# Patient Record
Sex: MALE | Race: WHITE | NOT HISPANIC OR LATINO | Employment: FULL TIME | URBAN - METROPOLITAN AREA
[De-identification: names, ages, dates, MRNs, and addresses within clinical notes are randomized per-mention and may not be internally consistent; named-entity substitution may affect disease eponyms.]

---

## 2017-01-10 ENCOUNTER — TRANSCRIBE ORDERS (OUTPATIENT)
Dept: ADMINISTRATIVE | Facility: HOSPITAL | Age: 47
End: 2017-01-10

## 2017-01-10 DIAGNOSIS — I27.82 CHRONIC PULMONARY THROMBOEMBOLISM SYNDROME (HCC): Primary | ICD-10-CM

## 2017-01-10 DIAGNOSIS — E83.110 PIGMENT CIRRHOSIS (HCC): ICD-10-CM

## 2017-01-10 DIAGNOSIS — N04.2 NEPHROTIC SYNDROME WITH LESION OF MEMBRANOUS GLOMERULONEPHRITIS: ICD-10-CM

## 2017-01-20 ENCOUNTER — HOSPITAL ENCOUNTER (OUTPATIENT)
Dept: MRI IMAGING | Facility: HOSPITAL | Age: 47
Discharge: HOME | End: 2017-01-20
Payer: COMMERCIAL

## 2017-01-20 DIAGNOSIS — I27.82 CHRONIC PULMONARY THROMBOEMBOLISM SYNDROME (HCC): ICD-10-CM

## 2017-01-20 DIAGNOSIS — N04.2 NEPHROTIC SYNDROME WITH LESION OF MEMBRANOUS GLOMERULONEPHRITIS: ICD-10-CM

## 2017-01-20 DIAGNOSIS — E83.110 PIGMENT CIRRHOSIS (HCC): ICD-10-CM

## 2017-01-20 PROCEDURE — 74181 MRI ABDOMEN W/O CONTRAST: CPT

## 2017-01-24 ENCOUNTER — GENERIC CONVERSION - ENCOUNTER (OUTPATIENT)
Dept: OTHER | Facility: OTHER | Age: 47
End: 2017-01-24

## 2017-02-21 PROBLEM — I27.82 CHRONIC PULMONARY EMBOLISM (HCC): Status: ACTIVE | Noted: 2017-02-21

## 2017-02-21 RX ORDER — LISINOPRIL 10 MG/1
10 TABLET ORAL DAILY
COMMUNITY
End: 2018-04-25 | Stop reason: DRUGHIGH

## 2017-02-21 RX ORDER — CHOLECALCIFEROL (VITAMIN D3) 125 MCG
1 TABLET ORAL DAILY
COMMUNITY
End: 2017-02-27 | Stop reason: ALTCHOICE

## 2017-02-27 ENCOUNTER — HOSPITAL ENCOUNTER (OUTPATIENT)
Dept: CT IMAGING | Facility: HOSPITAL | Age: 47
Discharge: HOME | End: 2017-02-27
Admitting: RADIOLOGY
Payer: COMMERCIAL

## 2017-02-27 ENCOUNTER — HOSPITAL ENCOUNTER (OUTPATIENT)
Dept: RADIOLOGY | Facility: HOSPITAL | Age: 47
Discharge: HOME | End: 2017-02-27
Payer: COMMERCIAL

## 2017-02-27 VITALS
TEMPERATURE: 98.1 F | DIASTOLIC BLOOD PRESSURE: 69 MMHG | HEART RATE: 75 BPM | OXYGEN SATURATION: 98 % | RESPIRATION RATE: 18 BRPM | SYSTOLIC BLOOD PRESSURE: 109 MMHG

## 2017-02-27 VITALS
BODY MASS INDEX: 32.21 KG/M2 | WEIGHT: 225 LBS | RESPIRATION RATE: 18 BRPM | HEIGHT: 70 IN | SYSTOLIC BLOOD PRESSURE: 129 MMHG | OXYGEN SATURATION: 100 % | TEMPERATURE: 97.2 F | HEART RATE: 87 BPM | DIASTOLIC BLOOD PRESSURE: 89 MMHG

## 2017-02-27 DIAGNOSIS — R93.89 ABNORMAL MRI: ICD-10-CM

## 2017-02-27 LAB
ERYTHROCYTE [DISTWIDTH] IN BLOOD BY AUTOMATED COUNT: 13.2 % (ref 11.6–15.1)
HCT VFR BLD AUTO: 44.1 % (ref 36.5–49.3)
HGB BLD-MCNC: 15.2 G/DL (ref 12–17)
INR PPP: 1.09 (ref 0.86–1.16)
MCH RBC QN AUTO: 30.6 PG (ref 26.8–34.3)
MCHC RBC AUTO-ENTMCNC: 34.5 G/DL (ref 31.4–37.4)
MCV RBC AUTO: 89 FL (ref 82–98)
PLATELET # BLD AUTO: 217 THOUSANDS/UL (ref 149–390)
PMV BLD AUTO: 9.8 FL (ref 8.9–12.7)
PROTHROMBIN TIME: 13.9 SECONDS (ref 12–14.3)
RBC # BLD AUTO: 4.97 MILLION/UL (ref 3.88–5.62)
WBC # BLD AUTO: 5.61 THOUSAND/UL (ref 4.31–10.16)

## 2017-02-27 PROCEDURE — 85610 PROTHROMBIN TIME: CPT | Performed by: RADIOLOGY

## 2017-02-27 PROCEDURE — 99152 MOD SED SAME PHYS/QHP 5/>YRS: CPT

## 2017-02-27 PROCEDURE — 88313 SPECIAL STAINS GROUP 2: CPT | Performed by: INTERNAL MEDICINE

## 2017-02-27 PROCEDURE — 76942 ECHO GUIDE FOR BIOPSY: CPT

## 2017-02-27 PROCEDURE — 88307 TISSUE EXAM BY PATHOLOGIST: CPT | Performed by: INTERNAL MEDICINE

## 2017-02-27 PROCEDURE — 85027 COMPLETE CBC AUTOMATED: CPT | Performed by: RADIOLOGY

## 2017-02-27 PROCEDURE — 83540 ASSAY OF IRON: CPT | Performed by: INTERNAL MEDICINE

## 2017-02-27 PROCEDURE — 47000 NEEDLE BIOPSY OF LIVER PERQ: CPT

## 2017-02-27 RX ORDER — FENTANYL CITRATE 50 UG/ML
INJECTION, SOLUTION INTRAMUSCULAR; INTRAVENOUS CODE/TRAUMA/SEDATION MEDICATION
Status: COMPLETED | OUTPATIENT
Start: 2017-02-27 | End: 2017-02-27

## 2017-02-27 RX ORDER — MIDAZOLAM HYDROCHLORIDE 1 MG/ML
INJECTION INTRAMUSCULAR; INTRAVENOUS CODE/TRAUMA/SEDATION MEDICATION
Status: COMPLETED | OUTPATIENT
Start: 2017-02-27 | End: 2017-02-27

## 2017-02-27 RX ORDER — OXYCODONE HYDROCHLORIDE AND ACETAMINOPHEN 5; 325 MG/1; MG/1
TABLET ORAL
Status: COMPLETED
Start: 2017-02-27 | End: 2017-02-27

## 2017-02-27 RX ORDER — OXYCODONE HYDROCHLORIDE AND ACETAMINOPHEN 5; 325 MG/1; MG/1
1 TABLET ORAL EVERY 6 HOURS PRN
Status: CANCELLED | OUTPATIENT
Start: 2017-02-27

## 2017-02-27 RX ORDER — SODIUM CHLORIDE 9 MG/ML
75 INJECTION, SOLUTION INTRAVENOUS CONTINUOUS
Status: DISCONTINUED | OUTPATIENT
Start: 2017-02-27 | End: 2017-02-28 | Stop reason: HOSPADM

## 2017-02-27 RX ORDER — OXYCODONE HYDROCHLORIDE AND ACETAMINOPHEN 5; 325 MG/1; MG/1
1 TABLET ORAL EVERY 6 HOURS PRN
Status: DISCONTINUED | OUTPATIENT
Start: 2017-02-27 | End: 2017-02-28 | Stop reason: HOSPADM

## 2017-02-27 RX ADMIN — MIDAZOLAM HYDROCHLORIDE 1 MG: 1 INJECTION, SOLUTION INTRAMUSCULAR; INTRAVENOUS at 08:54

## 2017-02-27 RX ADMIN — FENTANYL CITRATE 50 MCG: 50 INJECTION INTRAMUSCULAR; INTRAVENOUS at 08:50

## 2017-02-27 RX ADMIN — MIDAZOLAM HYDROCHLORIDE 1 MG: 1 INJECTION, SOLUTION INTRAMUSCULAR; INTRAVENOUS at 08:50

## 2017-02-27 RX ADMIN — MIDAZOLAM HYDROCHLORIDE 1 MG: 1 INJECTION, SOLUTION INTRAMUSCULAR; INTRAVENOUS at 08:45

## 2017-02-27 RX ADMIN — FENTANYL CITRATE 50 MCG: 50 INJECTION INTRAMUSCULAR; INTRAVENOUS at 08:55

## 2017-02-27 RX ADMIN — OXYCODONE HYDROCHLORIDE AND ACETAMINOPHEN 1 TABLET: 5; 325 TABLET ORAL at 09:17

## 2017-02-27 RX ADMIN — FENTANYL CITRATE 50 MCG: 50 INJECTION INTRAMUSCULAR; INTRAVENOUS at 08:45

## 2017-03-21 ENCOUNTER — TRANSCRIBE ORDERS (OUTPATIENT)
Dept: LAB | Facility: CLINIC | Age: 47
End: 2017-03-21

## 2017-03-21 ENCOUNTER — APPOINTMENT (OUTPATIENT)
Dept: LAB | Facility: CLINIC | Age: 47
End: 2017-03-21
Payer: COMMERCIAL

## 2017-03-21 DIAGNOSIS — N04.2 NEPHROTIC SYNDROME WITH DIFFUSE MEMBRANOUS GLOMERULONEPHRITIS: ICD-10-CM

## 2017-03-21 LAB
ALBUMIN SERPL BCP-MCNC: 3.7 G/DL (ref 3.5–5)
ANION GAP SERPL CALCULATED.3IONS-SCNC: 9 MMOL/L (ref 4–13)
BUN SERPL-MCNC: 17 MG/DL (ref 5–25)
CALCIUM SERPL-MCNC: 8.8 MG/DL (ref 8.3–10.1)
CHLORIDE SERPL-SCNC: 105 MMOL/L (ref 100–108)
CO2 SERPL-SCNC: 27 MMOL/L (ref 21–32)
CREAT SERPL-MCNC: 1.54 MG/DL (ref 0.6–1.3)
CREAT UR-MCNC: 201 MG/DL
ERYTHROCYTE [DISTWIDTH] IN BLOOD BY AUTOMATED COUNT: 13.3 % (ref 11.6–15.1)
GFR SERPL CREATININE-BSD FRML MDRD: 48.9 ML/MIN/1.73SQ M
GLUCOSE SERPL-MCNC: 88 MG/DL (ref 65–140)
HCT VFR BLD AUTO: 44.1 % (ref 36.5–49.3)
HGB BLD-MCNC: 14.9 G/DL (ref 12–17)
MCH RBC QN AUTO: 30.3 PG (ref 26.8–34.3)
MCHC RBC AUTO-ENTMCNC: 33.8 G/DL (ref 31.4–37.4)
MCV RBC AUTO: 90 FL (ref 82–98)
PHOSPHATE SERPL-MCNC: 3.3 MG/DL (ref 2.7–4.5)
PLATELET # BLD AUTO: 254 THOUSANDS/UL (ref 149–390)
PMV BLD AUTO: 9.7 FL (ref 8.9–12.7)
POTASSIUM SERPL-SCNC: 4.2 MMOL/L (ref 3.5–5.3)
PROT UR-MCNC: 45 MG/DL
PROT/CREAT UR: 0.22 MG/G{CREAT} (ref 0–0.1)
PTH-INTACT SERPL-MCNC: 38.6 PG/ML (ref 14–72)
RBC # BLD AUTO: 4.91 MILLION/UL (ref 3.88–5.62)
SODIUM SERPL-SCNC: 141 MMOL/L (ref 136–145)
WBC # BLD AUTO: 5.4 THOUSAND/UL (ref 4.31–10.16)

## 2017-03-21 PROCEDURE — 84156 ASSAY OF PROTEIN URINE: CPT

## 2017-03-21 PROCEDURE — 36415 COLL VENOUS BLD VENIPUNCTURE: CPT

## 2017-03-21 PROCEDURE — 80069 RENAL FUNCTION PANEL: CPT

## 2017-03-21 PROCEDURE — 85027 COMPLETE CBC AUTOMATED: CPT

## 2017-03-21 PROCEDURE — 82570 ASSAY OF URINE CREATININE: CPT

## 2017-03-21 PROCEDURE — 83970 ASSAY OF PARATHORMONE: CPT

## 2017-04-06 ENCOUNTER — ALLSCRIPTS OFFICE VISIT (OUTPATIENT)
Dept: OTHER | Facility: OTHER | Age: 47
End: 2017-04-06

## 2017-04-13 ENCOUNTER — GENERIC CONVERSION - ENCOUNTER (OUTPATIENT)
Dept: OTHER | Facility: OTHER | Age: 47
End: 2017-04-13

## 2017-09-26 ENCOUNTER — ALLSCRIPTS OFFICE VISIT (OUTPATIENT)
Dept: OTHER | Facility: OTHER | Age: 47
End: 2017-09-26

## 2017-09-26 ENCOUNTER — APPOINTMENT (OUTPATIENT)
Dept: RADIOLOGY | Age: 47
End: 2017-09-26
Payer: COMMERCIAL

## 2017-09-26 DIAGNOSIS — J20.9 ACUTE BRONCHITIS: ICD-10-CM

## 2017-09-26 PROCEDURE — 71020 HB CHEST X-RAY 2VW FRONTAL&LATL: CPT

## 2017-09-27 ENCOUNTER — GENERIC CONVERSION - ENCOUNTER (OUTPATIENT)
Dept: OTHER | Facility: OTHER | Age: 47
End: 2017-09-27

## 2017-10-01 DIAGNOSIS — N18.30 CHRONIC KIDNEY DISEASE, STAGE III (MODERATE) (HCC): ICD-10-CM

## 2017-10-01 DIAGNOSIS — I77.811 ABDOMINAL AORTIC ECTASIA (HCC): ICD-10-CM

## 2017-10-01 DIAGNOSIS — E78.5 HYPERLIPIDEMIA: ICD-10-CM

## 2017-10-13 ENCOUNTER — ALLSCRIPTS OFFICE VISIT (OUTPATIENT)
Dept: OTHER | Facility: OTHER | Age: 47
End: 2017-10-13

## 2017-10-27 ENCOUNTER — HOSPITAL ENCOUNTER (OUTPATIENT)
Dept: NON INVASIVE DIAGNOSTICS | Facility: CLINIC | Age: 47
Discharge: HOME | End: 2017-10-27
Payer: COMMERCIAL

## 2017-10-27 DIAGNOSIS — I77.811 ABDOMINAL AORTIC ECTASIA (HCC): ICD-10-CM

## 2017-10-27 DIAGNOSIS — E78.5 HYPERLIPIDEMIA: ICD-10-CM

## 2017-10-27 PROCEDURE — 93978 VASCULAR STUDY: CPT

## 2017-10-30 ENCOUNTER — GENERIC CONVERSION - ENCOUNTER (OUTPATIENT)
Dept: OTHER | Facility: OTHER | Age: 47
End: 2017-10-30

## 2018-01-09 NOTE — CONSULTS
Chief Complaint  pt is here for a new pt consult  no complaints  History of Present Illness  HPI: Consult from Dr Mendel Rios for abnormal vascular screening    52year old male had vascular screening last year  His abdominal aorta was 2 3 cm and read as ectatic  His bp is stable on Lisinopril  He has no chest pain, no dyspnea no palpitations  Family Hx: Father had CABG ten years ago  Soc Hx:   Works at [employer]  Review of Systems      Cardiac: No complaints of chest pain, no palpitations, no fainiting  Skin: No complaints of nonhealing sores or skin rash  Genitourinary: No complaints of recurrent urinary tract infections, frequent urination at night, difficult urination, blood in urine, kidney stones, loss of bladder control, no kidney or prostate problems, no erectile dysfunction  Psychological: No complaints of feeling depressed, anxiety, panic attacks, or difficulty concentrating  General: No complaints of trouble sleeping, lack of energy, fatigue, appetite changes, weight changes, fever, frequent infections, or night sweats  Respiratory: No complaints of shortness of breath, cough with sputum, or wheezing  HEENT: No complaints of serious problems, hearing problems, nose problems, throat problems, or snoring  Gastrointestinal: No complaints of liver problems, nausea, vomiting, heartburn, constipation, bloody stools, diarrhea, problems swallowing, adbominal pain, or rectal bleeding  Hematologic: No complaints of bleeding disorders, anemia, blood clots, or excessive brusing  Neurological: No complaints of numbness, tingling, dizziness, weakness, seizures, headaches, syncope or fainting, AM fatigue, daytime sleepiness, no witnessed apnea episodes  Musculoskeletal: No complaints of arthritis, back pain, or painfull swelling  ROS reviewed  Active Problems    1  Acute bronchitis (466 0) (J20 9)   2  Acute respiratory infection (519 8) (J22)   3   Atypical chest pain (786 59) (R07 89)   4  Chronic renal failure (585 9) (N18 9)   5  CKD (chronic kidney disease), stage III (585 3) (N18 3)   6  Cough (786 2) (R05)   7  CT Liver Mass Lesion ___cm (573 8)   8  Dysfunction of Eustachian tube, unspecified laterality (381 81) (H69 80)   9  Edema (782 3) (R60 9)   10  Hepatic hemangioma (228 04) (D18 03)   11  History of allergy (V15 09) (Z88 9)   12  History of long-term treatment with high-risk medication (V58 69) (Z92 29)   13  Hyperkalemia (276 7) (E87 5)   14  Hyperlipidemia (272 4) (E78 5)   15  Hypertension (401 9) (I10)   16  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)   17  Need for prophylactic vaccination and inoculation against influenza (V04 81) (Z23)   18  Need for prophylactic vaccination and inoculation against influenza (V04 81) (Z23)   19  Nephrotic syndrome with membranous glomerulonephritis (581 1) (N04 2)   20  Neuritis (729 2) (M79 2)   21  Obesity (278 00) (E66 9)   22  Prepatellar bursitis of left knee (726 65) (M70 42)   23  Proteinuria (791 0) (R80 9)   24  Pulmonary embolism (415 19) (I26 99)   25  Special screening, prostate cancer (V76 44) (Z12 5)   26  Stress (V62 89) (F43 9)   27  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)   28  Ultrasound Abdominal Liver Nonspecific Abnormality (794 8)   29  Varicocele (456 4) (I86 1)    Past Medical History    · History of Acute bronchitis (466 0) (J20 9)   · History of Community acquired pneumonia (5) (J18 9)   · History of Elevated blood pressure reading without diagnosis of hypertension (796 2)  (R03 0)   · History of Influenza A (487 1) (J10 1)   · Need for prophylactic vaccination and inoculation against influenza (V04 81) (Z23)    The active problems and past medical history were reviewed and updated today        Surgical History    · History of Adenoidectomy   · History of Encounter for male sterilization procedure (V25 2) (Z30 2)   · History of Hemorrhoidectomy    The surgical history was reviewed and updated today  Family History    · Family history of Heart Disease (V17 49)    · Family history of Coronary Artery Disease (V17 49)   · Family history of Nephrolithiasis    · Family history of Family Health Status Of Father - Good   · Family history of Family Health Status Of Mother - Good    The family history was reviewed and updated today  Social History    · Denied: History of Drug Use   · Marital History - Currently    · Never a smoker   · Never Drank Alcohol  The social history was reviewed and updated today  Current Meds   1  Lisinopril 5 MG Oral Tablet; TAKE ONE TABLET BY MOUTH ONCE DAILY; Therapy: 51Okt8495 to (Masha Sanchez)  Requested for: 94WDE1819; Last   HT:98ZVT3164 Ordered   2  Vitamin D 2000 UNIT Oral Tablet; Take 1 tablet daily; Therapy: (Recorded:96Bdt0996) to Recorded    The medication list was reviewed and updated today  Allergies    1  No Known Drug Allergies    2  Mold   3  No Known Food Allergies    Vitals  Signs    Heart Rate: 80  Systolic: 718, LUE, Sitting  Diastolic: 88, LUE, Sitting  Height: 5 ft 8 in  Weight: 234 lb 4 oz  BMI Calculated: 35 62  BSA Calculated: 2 19  O2 Saturation: 98    Physical Exam    Constitutional   General appearance: No acute distress, well appearing and well nourished  Eyes   Conjunctiva and Sclera examination: Conjunctiva pink, sclera anicteric  Ears, Nose, Mouth, and Throat - Oropharynx: Clear, nares are clear, mucous membranes are moist    Neck   Neck and thyroid: Normal, supple, trachea midline, no thyromegaly  Pulmonary   Respiratory effort: No increased work of breathing or signs of respiratory distress  Auscultation of lungs: Clear to auscultation, no rales, no rhonchi, no wheezing, good air movement  Cardiovascular   Auscultation of heart: Normal rate and rhythm, normal S1 and S2, no murmurs  Carotid pulses: Normal, 2+ bilaterally      Peripheral vascular exam: Normal pulses throughout, no tenderness, erythema or swelling  Pedal pulses: Normal, 2+ bilaterally  Examination of extremities for edema and/or varicosities: Normal     Abdomen   Abdomen: Non-tender and no distention  Liver and spleen: No hepatomegaly or splenomegaly  Musculoskeletal Gait and station: Normal gait  Digits and nails: Normal without clubbing or cyanosis  Inspection/palpation of joints, bones, and muscles: Normal, ROM normal     Skin - Skin and subcutaneous tissue: Normal without rashes or lesions  Skin is warm and well perfused, normal turgor  Neurologic - Cranial nerves: II - XII intact  Speech: Normal     Psychiatric - Orientation to person, place, and time: Normal  Mood and affect: Normal       Results/Data  A 12 lead ECG was performed and was normal    Rhythm and rate:  ventricular rate is 79 beats per minute  normal sinus rhythm  Axis: left axis deviation  QRS: the QRS is normal   ST segment: the ST segments are normal       Assessment    1  Ectatic abdominal aorta (447 72) (I77 811)   2  Hypertension (401 9) (I10)   3  Hyperlipidemia (272 4) (E78 5)    Plan  Ectatic abdominal aorta, Hyperlipidemia    · VAS ABDOMINAL AORTA/ILIACS; COMPLETE STUDY; Status:Active; Requested  [id number];    Perform:[facility]; Due:81Cuh4285; Last Updated By:Mamta Pretty; 10/13/2017 4:42:08 PM;Ordered;  For:Ectatic abdominal aorta, Hyperlipidemia; Ordered By:Owen Sal; Hyperlipidemia    · Follow-up PRN Evaluation and Treatment  Follow-up  Status: Complete  Done:  02UWS4447   Ordered; For: Hyperlipidemia; Ordered By: Juan Manuel Diehl Performed:  Due: 40DNN6957  Hypertension    · EKG/ECG- POC; Status:Complete;   Done: 13YFN1229   Perform: In Office; Due:38Nqm6059; Last Updated By:Gabbie Puga; 10/13/2017 4:10:26 PM;Ordered;  For:Hypertension; Ordered By:Owen Sal; Discussion/Summary    Will check aortoiliac ultrasound  He is otherwise asymptomatic and has no other active issues     The patient was counseled regarding diagnostic results, instructions for management, risk factor reductions, impressions  total time of encounter was 25 minutes and 15 minutes was spent counseling        Future Appointments    Date/Time Provider Specialty Site   09/27/2018 04:00 PM Lyndon Hull DO Internal Medicine [facility]     Signatures   Electronically signed by : CLAUS Harrington ; Oct 16 2017  8:01AM EST                       (Author)

## 2018-01-10 NOTE — RESULT NOTES
Message   notify the patient the chest x-ray-no active pulmonary disease follow up as scheduled        Verified Results  * XR CHEST PA & LATERAL 70Iox8686 06:13PM Monique Haines Order Number: [id number]     Test Name Result Flag Reference   XR CHEST PA & LATERAL (Report)     CHEST      INDICATION: Productive cough     COMPARISON: 12/29/2015     VIEWS: Frontal and lateral projections     IMAGES: 2     FINDINGS:        Cardiomediastinal silhouette appears unremarkable  The lungs are clear  No pneumothorax or pleural effusion  Visualized osseous structures appear within normal limits for the patient's age  IMPRESSION:     No active pulmonary disease         Workstation performed: [id number]     Signed by:   Ariela Hutchins MD   9/27/17       Signatures   Electronically signed by : Shanell Davis DO; Sep 27 2017 10:04PM EST                       (Author)

## 2018-01-12 NOTE — MISCELLANEOUS
Message   Recorded as Task   Date: 12/14/2016 12:49 PM, Created By: Carmita Flor   Task Name: Miscellaneous   Assigned To: Priscilla Armando   Regarding Patient: Krishna Crawford, Status: Active   CommentVeleria Stacks - 14 Dec 2016 12:49 PM     TASK CREATED  Repeat labs reviewed -renal function stable, creatinine at 1 36, proteinuria stable at 0 49 off Tacrolimus  no changes on medications  Please call patient - things are stable  Lets repeat a CBC, renal function panel, PTH and UPC in 3 months  Thanks,    GC     I spoke to the patient and he is aware of above  I mailed the patient lab slips for March AG      Active Problems    1  Acute respiratory infection (519 8) (J22)   2  Atypical chest pain (786 59) (R07 89)   3  Chronic renal failure (585 9) (N18 9)   4  CKD (chronic kidney disease), stage III (585 3) (N18 3)   5  Cough (786 2) (R05)   6  CT Liver Mass Lesion ___cm (573 8)   7  Dysfunction of eustachian tube, unspecified laterality (381 81) (H69 80)   8  Edema (782 3) (R60 9)   9  Hepatic hemangioma (228 04) (D18 03)   10  History of allergy (V15 09) (Z88 9)   11  History of long-term treatment with high-risk medication (V58 69) (Z92 29)   12  Hyperkalemia (276 7) (E87 5)   13  Hyperlipidemia (272 4) (E78 5)   14  Hypertension (401 9) (I10)   15  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)   16  Need for prophylactic vaccination and inoculation against influenza (V04 81) (Z23)   17  Need for prophylactic vaccination and inoculation against influenza (V04 81) (Z23)   18  Nephrotic syndrome with membranous glomerulonephritis (581 1) (N04 2)   19  Neuritis (729 2) (M79 2)   20  Obesity (278 00) (E66 9)   21  Prepatellar bursitis of left knee (726 65) (M70 42)   22  Proteinuria (791 0) (R80 9)   23  Pulmonary embolism (415 19) (I26 99)   24  Special screening, prostate cancer (V76 44) (Z12 5)   25  Stress (V62 89) (F43 9)   26  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)   27   Ultrasound Abdominal Liver Nonspecific Abnormality (794 8)   28  Varicocele (456 4) (I86 1)    Current Meds   1  Lisinopril 5 MG Oral Tablet; TAKE 1 TABLET DAILY; Therapy: 05Xsx2647 to (Evaluate:14Rvt9427)  Requested for: 36Sbg3194; Last   Rx:89Ons4261 Ordered   2  Tacrolimus 1 MG Oral Capsule; take 1 capsule daily; Therapy: 06VKK3191 to (Evaluate:47Ryg4980)  Requested for: 18GUO5331; Last   Rx:14Jun2016 Ordered   3  Vitamin D 2000 UNIT Oral Tablet; Take 1 tablet daily Recorded    Allergies    1  No Known Drug Allergies    2  Mold   3  No Known Food Allergies    Plan  Nephrotic syndrome with membranous glomerulonephritis    · (1) CBC/ PLT (NO DIFF); Status:Active - Retrospective By Protocol Authorization; Requested for:01Mar2017;    · (1) PTH N-TERMINAL (INTACT); Status:Active - Retrospective By Protocol Authorization; Requested for:01Mar2017;    · (1) RENAL FUNCTION PANEL; Status:Active - Retrospective By Protocol Authorization; Requested for:01Mar2017;    · (1) URINE PROTEIN CREATININE RATIO; Status:Active - Retrospective By Protocol  Authorization;  Requested LRP:[id number];     Signatures   Electronically signed by : CLAUS Fung ; Dec 15 2016  2:12PM EST

## 2018-01-12 NOTE — PROGRESS NOTES
Assessment    1  Acute bronchitis (466 0) (J20 9)   2  Encounter for preventive health examination (V70 0) (Z00 00)    Assessment and plan #1 annual wellness examination overall the patient is clinically stable and doing very well; I have counseled patient follow a healthy balanced diet, I would like the patient to start exercising routinely and have encouraged the patient to lose weight I did recommend a flu vaccine in the fall when he is feeling better I will recheck a comprehensive metabolic panel and also lipid panel  RTO in one year call with any problems  Plan  Acute bronchitis    · Azithromycin 250 MG Oral Tablet (Zithromax Z-Tarik); TAKE 2 TABLETS ON DAY 1  THEN TAKE 1 TABLET A DAY FOR 4 DAYS   · ProAir  (90 Base) MCG/ACT Inhalation Aerosol Solution; INHALE 2 PUFFS  EVERY 4-6 HOURS AS NEEDED   · * XR CHEST PA & LATERAL; Status:Active; Requested LJY:55BSO5031; Health Maintenance    · (1) COMPREHENSIVE METABOLIC PANEL; Status:Active; Requested CDK:12FRF8874; Health Maintenance, Obesity    · (1) LIPID PANEL, FASTING; Status:Active; Requested TNC:23MEJ0546;     Chief Complaint  Patient is here for a yearly wellness exam       History of Present Illness  HM, Adult Male: The patient is being seen for a health maintenance evaluation  The last health maintenance visit was 1 year(s) ago  Social History: Household members include spouse  He is   Work status: working full time and occupation:   The patient has never smoked cigarettes  He reports rare alcohol use  He has never used illicit drugs  General Health: The patient's health since the last visit is described as good  He has regular dental visits  The patient brushes 1 time(s) a day, flosses occasionally and reports his last dental visit was July 2017  He denies vision problems  Vision care includes no recent eye examination  He has hearing loss  hearing is slightly decreased   He doesn't wear a hearing aid  Immunizations status: up to date  Lifestyle:  He consumes a diverse and healthy diet  He is on a low salt diet  Dietary details include 0 servings of fruit per day, 2 servings of vegetables per day, 1 servings of meat per day, 0-1 servings of whole grains per day and 1-2 servings of dairy foods per day  He has weight concerns  He exercises regularly  He exercises 3 times per week for 15-20 minutes per session  Exercise includes walking  He does not use tobacco  He consumes alcohol  He reports occasional alcohol use  He typically drinks beer  He denies drug use  Reproductive health:  the patient is sexually active  birth control is not being practiced  He denies erectile dysfunction  Screening: Active Problems    1  Acute respiratory infection (519 8) (J22)   2  Atypical chest pain (786 59) (R07 89)   3  Chronic renal failure (585 9) (N18 9)   4  CKD (chronic kidney disease), stage III (585 3) (N18 3)   5  Cough (786 2) (R05)   6  CT Liver Mass Lesion ___cm (573 8)   7  Dysfunction of Eustachian tube, unspecified laterality (381 81) (H69 80)   8  Edema (782 3) (R60 9)   9  Hepatic hemangioma (228 04) (D18 03)   10  History of allergy (V15 09) (Z88 9)   11  History of long-term treatment with high-risk medication (V58 69) (Z92 29)   12  Hyperkalemia (276 7) (E87 5)   13  Hyperlipidemia (272 4) (E78 5)   14  Hypertension (401 9) (I10)   15  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)   16  Need for prophylactic vaccination and inoculation against influenza (V04 81) (Z23)   17  Need for prophylactic vaccination and inoculation against influenza (V04 81) (Z23)   18  Nephrotic syndrome with membranous glomerulonephritis (581 1) (N04 2)   19  Neuritis (729 2) (M79 2)   20  Obesity (278 00) (E66 9)   21  Prepatellar bursitis of left knee (726 65) (M70 42)   22  Proteinuria (791 0) (R80 9)   23  Pulmonary embolism (415 19) (I26 99)   24  Special screening, prostate cancer (V76 44) (Z12 5)   25   Stress (V62 89) (F43 9)   26  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)   27  Ultrasound Abdominal Liver Nonspecific Abnormality (794 8)   28  Varicocele (456 4) (I86 1)    Past Medical History    · History of Acute bronchitis (466 0) (J20 9)   · History of Community acquired pneumonia (5) (J18 9)   · History of Elevated blood pressure reading without diagnosis of hypertension (796 2)  (R03 0)   · History of Influenza A (487 1) (J10 1)   · Need for prophylactic vaccination and inoculation against influenza (V04 81) (Z23)    Surgical History    · History of Adenoidectomy   · History of Encounter for male sterilization procedure (V25 2) (Z30 2)   · History of Hemorrhoidectomy    Family History  Mother    · Family history of Heart Disease (V17 49)  Father    · Family history of Coronary Artery Disease (V17 49)   · Family history of Nephrolithiasis  Family History    · Family history of Family Health Status Of Father - Good   · Family history of Family Health Status Of Mother - Good    Social History    · Denied: History of Drug Use   · Marital History - Currently    · Never a smoker   · Never Drank Alcohol    Current Meds   1  Lisinopril 5 MG Oral Tablet; TAKE ONE TABLET BY MOUTH ONCE DAILY; Therapy: 93Vsh6203 to ([facility])  Requested for: 32FPK1105; Last   TL:37NMQ8580 Ordered   2  Vitamin D 2000 UNIT Oral Tablet; Take 1 tablet daily; Therapy: (Recorded:61Jif6849) to Recorded    Allergies    1  No Known Drug Allergies    2  Mold   3  No Known Food Allergies    Vitals   Recorded: 23BPE7500 03:18PM   Temperature 98 3 F   Heart Rate 84   Respiration 16   Systolic 199, LUE, Sitting   Diastolic 90, LUE, Sitting   Weight 232 lb    O2 Saturation 99     Physical Exam    Constitutional   General appearance: No acute distress, well appearing and well nourished  Head and Face   Head and face: Normal     Eyes   Conjunctiva and lids: No erythema, swelling or discharge      Pupils and irises: Equal, round, reactive to light     Ears, Nose, Mouth, and Throat   External inspection of ears and nose: Normal     Otoscopic examination: Tympanic membranes translucent with normal light reflex  Canals patent without erythema  Hearing: Normal     Nasal mucosa, septum, and turbinates: Abnormal   There was clear rhinorrhea from both nares  Lips, teeth, and gums: Normal, good dentition  Oropharynx: Abnormal   The posterior pharynx was erythematous, but did not have an exudate  Neck   Neck: Supple, symmetric, trachea midline, no masses  Thyroid: Normal, no thyromegaly  Pulmonary   Respiratory effort: No increased work of breathing or signs of respiratory distress  Auscultation of lungs: Abnormal   Auscultation of the lungs revealed expiratory wheezing  Cardiovascular   Auscultation of heart: Normal rate and rhythm, normal S1 and S2, no murmurs  Pedal pulses: 2+ bilaterally  Examination of extremities for edema and/or varicosities: Normal     Abdomen   Abdomen: Non-tender, no masses  Liver and spleen: No hepatomegaly or splenomegaly  Lymphatic   Palpation of lymph nodes in neck: No lymphadenopathy  Psychiatric   Mood and affect: Normal        Future Appointments    Date/Time Provider Specialty Site   10/13/2017 04:20 PM CLAUS Bowers   Cardiology [address]   09/27/2018 04:00 PM Karena Duncan DO Internal Medicine [facility]     Signatures   Electronically signed by : Rufina Amin DO; Sep 26 2017  9:02PM EST                       (Author)

## 2018-01-13 VITALS
WEIGHT: 232 LBS | TEMPERATURE: 98.3 F | BODY MASS INDEX: 35.28 KG/M2 | HEART RATE: 84 BPM | RESPIRATION RATE: 16 BRPM | SYSTOLIC BLOOD PRESSURE: 144 MMHG | OXYGEN SATURATION: 99 % | DIASTOLIC BLOOD PRESSURE: 90 MMHG

## 2018-01-13 VITALS
BODY MASS INDEX: 35.61 KG/M2 | SYSTOLIC BLOOD PRESSURE: 135 MMHG | HEIGHT: 68 IN | WEIGHT: 235 LBS | DIASTOLIC BLOOD PRESSURE: 85 MMHG | HEART RATE: 68 BPM

## 2018-01-13 NOTE — MISCELLANEOUS
Message   Recorded as Task   Date: 10/06/2016 07:39 PM, Created By: Mahi Schultz   Task Name: Miscellaneous   Assigned To: Emiliano Knutson   Regarding Patient: Sun Sesay, Status: Active   CommentAnnella Orris - 06 Oct 2016 7:39 PM     TASK CREATED  labs reviewed, serum albumin and proteinuria stable and minimal   Please call patient - plan to STOP Tacrolimus and repeat a renal function panel and UPC in 2 months  Thanks,    GC       I spoke to the patient and he is aware to stop the Tacrolimus  I mailed the lab slips for 2 months    AG      Active Problems    1  Acute respiratory infection (519 8) (J22)   2  Atypical chest pain (786 59) (R07 89)   3  Chronic renal failure (585 9) (N18 9)   4  CKD (chronic kidney disease), stage III (585 3) (N18 3)   5  Cough (786 2) (R05)   6  CT Liver Mass Lesion ___cm (573 8)   7  Dysfunction of eustachian tube, unspecified laterality (381 81) (H69 80)   8  Edema (782 3) (R60 9)   9  Hepatic hemangioma (228 04) (D18 03)   10  History of allergy (V15 09) (Z88 9)   11  History of long-term treatment with high-risk medication (V58 69) (Z92 29)   12  Hyperkalemia (276 7) (E87 5)   13  Hyperlipidemia (272 4) (E78 5)   14  Hypertension (401 9) (I10)   15  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)   16  Need for prophylactic vaccination and inoculation against influenza (V04 81) (Z23)   17  Need for prophylactic vaccination and inoculation against influenza (V04 81) (Z23)   18  Nephrotic syndrome with membranous glomerulonephritis (581 1) (N04 2)   19  Neuritis (729 2) (M79 2)   20  Obesity (278 00) (E66 9)   21  Prepatellar bursitis of left knee (726 65) (M70 42)   22  Proteinuria (791 0) (R80 9)   23  Pulmonary embolism (415 19) (I26 99)   24  Special screening, prostate cancer (V76 44) (Z12 5)   25  Stress (V62 89) (F43 9)   26  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)   27  Ultrasound Abdominal Liver Nonspecific Abnormality (794 8)   28   Varicocele (456 4) (I86 1)    Current Meds   1  Lisinopril 5 MG Oral Tablet; TAKE 1 TABLET DAILY; Therapy: 14Yof3221 to (Evaluate:12Njk6124)  Requested for: 93Wkd3298; Last   Rx:62Zka9426 Ordered   2  Tacrolimus 1 MG Oral Capsule; take 1 capsule daily; Therapy: 74MRY6371 to (Evaluate:89Nvt6627)  Requested for: 77AYP7056; Last   Rx:49Owg0911 Ordered   3  Vitamin D 2000 UNIT Oral Tablet; Take 1 tablet daily Recorded    Allergies    1  No Known Drug Allergies    2  Mold   3  No Known Food Allergies    Plan  Membranous glomerulonephritis    · (1) RENAL FUNCTION PANEL; Status:Active - Retrospective By Protocol Authorization; Requested for:98Kmo0905;    · (1) URINE PROTEIN CREATININE RATIO; Status:Active - Retrospective By Protocol  Authorization;  Requested for:13Vum5288;     Signatures   Electronically signed by : CLAUS Alvarez ; Oct 10 2016  4:30PM EST

## 2018-01-13 NOTE — MISCELLANEOUS
Message   Recorded as Task   Date: 06/07/2016 02:12 PM, Created By: Jessika Neumann   Task Name: Miscellaneous   Assigned To: Franciscodaniel Search   Regarding Patient: Kim Ahumada, Status: Active   CommentDorenda Friday - 07 Jun 2016 2:12 PM     TASK CREATED  Repeat UPC slightly better proteinuria but higher compared with numbers last year  Now UPC 0 87 gr  Please call Jeri Simone and tell him to continue with same dose of Tacrolimus (he should be on 1 mgr daily) and will follow repeat labs in 2 months (CBC, renal function panel, and UPC)  Thanks,  GC       I spoke with the patient and he is aware of above  I mailed out a lab slip for him to do in August AG      Active Problems    1  Acute respiratory infection (519 8) (J22)   2  Atypical chest pain (786 59) (R07 89)   3  Chronic renal failure (585 9) (N18 9)   4  Cough (786 2) (R05)   5  CT Liver Mass Lesion ___cm (573 8)   6  Dysfunction of eustachian tube, unspecified laterality (381 81) (H69 80)   7  Edema (782 3) (R60 9)   8  Hepatic hemangioma (228 04) (D18 03)   9  History of allergy (V15 09) (Z88 9)   10  History of long-term treatment with high-risk medication (V58 69) (Z92 29)   11  Hyperkalemia (276 7) (E87 5)   12  Hyperlipidemia (272 4) (E78 5)   13  Hypertension (401 9) (I10)   14  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)   15  Need for prophylactic vaccination and inoculation against influenza (V04 81) (Z23)   16  Need for prophylactic vaccination and inoculation against influenza (V04 81) (Z23)   17  Nephrotic syndrome with membranous glomerulonephritis (581 1) (N04 2)   18  Neuritis (729 2) (M79 2)   19  Obesity (278 00) (E66 9)   20  Proteinuria (791 0) (R80 9)   21  Pulmonary embolism (415 19) (I26 99)   22  Screening for prostate cancer (V76 44) (Z12 5)   23  Stress (V62 89) (F43 9)   24  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)   25  Ultrasound Abdominal Liver Nonspecific Abnormality (794 8)   26   Varicocele (456 4) (I86 1)    Current Meds   1  AmLODIPine Besylate 5 MG Oral Tablet; TAKE 1 TABLET DAILY; Therapy: 31WJC1366 to (Evaluate:44Oih8784)  Requested for: 45WPE1396; Last   Rx:27Zgb1413 Ordered   2  Simvastatin 20 MG Oral Tablet; TAKE ONE TABLET BY MOUTH ONCE DAILY; Therapy: 56YRP1088 to (Evaluate:10Bmd8677)  Requested for: 45Cjg8009; Last   Rx:25Uwj1346 Ordered   3  Tacrolimus 1 MG Oral Capsule; take 1 capsule daily  Requested for: 09YRJ8654; Last   Rx:95Hnh4224 Ordered   4  Vitamin D 2000 UNIT Oral Tablet; Take 1 tablet daily Recorded    Allergies    1  No Known Drug Allergies    2  Mold   3  No Known Food Allergies    Plan  Nephrotic syndrome with membranous glomerulonephritis    · (1) CBC/ PLT (NO DIFF); Status:Active; Requested for:16Jux4288;    · (1) RENAL FUNCTION PANEL; Status:Active; Requested for:09Whe0127;    · (1) URINE PROTEIN CREATININE RATIO; Status:Active;  Requested for:55Yqy9022;     Signatures   Electronically signed by : CLAUS Lala ; Jun 7 2016  3:11PM EST

## 2018-01-13 NOTE — RESULT NOTES
Message   Notify the patient x-ray of the left knee no acute osseous abnormality f/u as scheduled        Verified Results  * XR KNEE 3 VW LEFT NON INJURY 83Ftp0992 04:02PM Emil Canseco Order Number: [id number]     Test Name Result Flag Reference   XR KNEE 3 VW LEFT (Report)     LEFT KNEE     INDICATION: Left knee pain  M [age]     COMPARISON: None     VIEWS: 3; 3 images with weightbearing     FINDINGS:     There is no acute fracture or dislocation  There is no joint effusion  No degenerative changes  No lytic or blastic lesions are seen  Soft tissues are unremarkable  IMPRESSION:     No acute osseous abnormality         Workstation performed: [id number]     Signed by:   Jim Smith MD   9/30/16       Signatures   Electronically signed by : Andreia Cao DO; Oct  1 2016  5:17PM EST                       (Author)

## 2018-01-14 VITALS
SYSTOLIC BLOOD PRESSURE: 128 MMHG | WEIGHT: 234.25 LBS | DIASTOLIC BLOOD PRESSURE: 88 MMHG | HEART RATE: 80 BPM | BODY MASS INDEX: 35.5 KG/M2 | HEIGHT: 68 IN | OXYGEN SATURATION: 98 %

## 2018-01-15 NOTE — RESULT NOTES
Verified Results  VAS ABDOMINAL AORTA/ILIACS; COMPLETE STUDY [id number] 03:48PM Lara Jin Order Number: [id number]    - Patient Instructions: To schedule this appointment, please contact Central Scheduling at [phone]  Test Name Result Flag Reference   VAS ABDOMINAL AORTA/ILIACS; COMPLETE STUDY (Report)     [facility] REPORT   CLINICAL:   Indications:   Evaluate ectatic abdominal aorta as indicated from a screening done 8/10/2016  Patient is asymptomatic  Risk Factors: The patient has history of Hypertension  Clinical   Left Pressure: 126/84 mm Hg  FINDINGS:      Unilateral    PSV (cm/s) AP (cm) TRV (cm)    Sup-Megan Ao        85              Sup Renal Aorta              2 0    Jux Renal Aorta     73   1 9         Px Inf-Carmine Ao          2 2    2 2    Ds Inf-Carmine Ao      69   1 8    1 8    Celiac         182              Prox  SMA        225                 Segment   Right             Left             PSV (cm/s) AP (cm) TRV (cm) PSV (cm/s) AP (cm) TRV (cm)    Prox ANTON      113   1 2    1 1     67   1 2    1 2    Dist ANTON      90               75              Prox  EIA      99   1 0          90              Dist EIA      104               95   1 0         Prox Renal     150               98                       CONCLUSION:   Impression   The aorta is patent and normal in caliber measuring 2 2 cm in its greatest   diameter  (Prior 2 3 cm)      Bilateral common and external iliac arteries are patent and normal caliber  Celiac, superior mesenteric, and bilateral proximal renal arteries are patent        SIGNATURE:   Electronically Signed by: Elisa Hedrick MD on 2017-10-27 09:23:03 PM

## 2018-01-15 NOTE — MISCELLANEOUS
Message   Recorded as Task   Date: 09/07/2016 01:09 PM, Created By: Juliocesar Verde   Task Name: Miscellaneous   Assigned To: Sina Chowdhury   Regarding Patient: Keegan Sevilla, Status: Active   CommentBreezy Hernandez - 07 Sep 2016 1:09 PM     TASK CREATED  lab after starting ACE-I reviewed - creatinine and potassium stable, no change on medications for now  Please call patient and convey message and ask for his BP readings  Bri Fitzpatrick - 07 Sep 2016 2:57 PM     TASK REPLIED TO: Previously Assigned To Patricia Molina  Patient is aware of above    Bp readings:         8/30 124/70 p72  9/2   118/77 p74  9/5   131/76 p69  9/6   126/71 p71   Hayden Lee - 07 Sep 2016 3:07 PM     TASK REPLIED TO: Previously Assigned To Hayden Lee  BP OK, nicolas        Active Problems    1  Acute respiratory infection (519 8) (J22)   2  Atypical chest pain (786 59) (R07 89)   3  Chronic renal failure (585 9) (N18 9)   4  CKD (chronic kidney disease), stage III (585 3) (N18 3)   5  Cough (786 2) (R05)   6  CT Liver Mass Lesion ___cm (573 8)   7  Dysfunction of eustachian tube, unspecified laterality (381 81) (H69 80)   8  Edema (782 3) (R60 9)   9  Hepatic hemangioma (228 04) (D18 03)   10  History of allergy (V15 09) (Z88 9)   11  History of long-term treatment with high-risk medication (V58 69) (Z92 29)   12  Hyperkalemia (276 7) (E87 5)   13  Hyperlipidemia (272 4) (E78 5)   14  Hypertension (401 9) (I10)   15  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)   16  Need for prophylactic vaccination and inoculation against influenza (V04 81) (Z23)   17  Need for prophylactic vaccination and inoculation against influenza (V04 81) (Z23)   18  Nephrotic syndrome with membranous glomerulonephritis (581 1) (N04 2)   19  Neuritis (729 2) (M79 2)   20  Obesity (278 00) (E66 9)   21  Proteinuria (791 0) (R80 9)   22   Pulmonary embolism (415 19) (I26 99)   23  Special screening, prostate cancer (V76 44) (Z12 5) 24  Stress (V62 89) (F43 9)   25  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)   26  Ultrasound Abdominal Liver Nonspecific Abnormality (794 8)   27  Varicocele (456 4) (I86 1)    Current Meds   1  Lisinopril 5 MG Oral Tablet; TAKE 1 TABLET DAILY; Therapy: 78Scz2037 to (Evaluate:40Qij8085)  Requested for: 28Hck2736; Last   Rx:89Jgb8561 Ordered   2  Tacrolimus 1 MG Oral Capsule; take 1 capsule daily; Therapy: 47YAQ0758 to (Evaluate:02Csm2320)  Requested for: 83UXO9985; Last   Rx:10Nsj9976 Ordered   3  Vitamin D 2000 UNIT Oral Tablet; Take 1 tablet daily Recorded    Allergies    1  No Known Drug Allergies    2  Mold   3   No Known Food Allergies    Signatures   Electronically signed by : CLAUS Villavicencio ; Sep  7 2016  3:17PM EST

## 2018-02-15 ENCOUNTER — TELEPHONE (OUTPATIENT)
Dept: NEPHROLOGY | Facility: CLINIC | Age: 48
End: 2018-02-15

## 2018-02-15 DIAGNOSIS — I10 ESSENTIAL HYPERTENSION: ICD-10-CM

## 2018-02-15 DIAGNOSIS — N18.30 CKD (CHRONIC KIDNEY DISEASE) STAGE 3, GFR 30-59 ML/MIN (HCC): Primary | ICD-10-CM

## 2018-02-15 DIAGNOSIS — N05.2 MEMBRANOUS GLOMERULONEPHRITIS: ICD-10-CM

## 2018-02-15 NOTE — TELEPHONE ENCOUNTER
Patient called because he has an appt on 4/11 and no lab work to be done  I put the orders into the system and he will have them done at [facility]

## 2018-03-30 ENCOUNTER — APPOINTMENT (OUTPATIENT)
Dept: LAB | Facility: CLINIC | Age: 48
End: 2018-03-30
Payer: COMMERCIAL

## 2018-03-30 ENCOUNTER — TRANSCRIBE ORDERS (OUTPATIENT)
Dept: LAB | Facility: CLINIC | Age: 48
End: 2018-03-30

## 2018-03-30 DIAGNOSIS — N18.30 CKD (CHRONIC KIDNEY DISEASE) STAGE 3, GFR 30-59 ML/MIN (HCC): ICD-10-CM

## 2018-03-30 DIAGNOSIS — I10 ESSENTIAL HYPERTENSION: ICD-10-CM

## 2018-03-30 DIAGNOSIS — N05.2 MEMBRANOUS GLOMERULONEPHRITIS: ICD-10-CM

## 2018-03-30 LAB
ALBUMIN SERPL BCP-MCNC: 3.5 G/DL (ref 3.5–5)
ANION GAP SERPL CALCULATED.3IONS-SCNC: 8 MMOL/L (ref 4–13)
BASOPHILS # BLD AUTO: 0.05 THOUSANDS/ΜL (ref 0–0.1)
BASOPHILS NFR BLD AUTO: 1 % (ref 0–1)
BUN SERPL-MCNC: 19 MG/DL (ref 5–25)
CALCIUM SERPL-MCNC: 8.8 MG/DL (ref 8.3–10.1)
CHLORIDE SERPL-SCNC: 108 MMOL/L (ref 100–108)
CO2 SERPL-SCNC: 26 MMOL/L (ref 21–32)
CREAT SERPL-MCNC: 1.33 MG/DL (ref 0.6–1.3)
CREAT UR-MCNC: 196 MG/DL
EOSINOPHIL # BLD AUTO: 0.17 THOUSAND/ΜL (ref 0–0.61)
EOSINOPHIL NFR BLD AUTO: 3 % (ref 0–6)
ERYTHROCYTE [DISTWIDTH] IN BLOOD BY AUTOMATED COUNT: 13.1 % (ref 11.6–15.1)
GFR SERPL CREATININE-BSD FRML MDRD: 63 ML/MIN/1.73SQ M
GLUCOSE P FAST SERPL-MCNC: 88 MG/DL (ref 65–99)
HCT VFR BLD AUTO: 42.3 % (ref 36.5–49.3)
HGB BLD-MCNC: 14.8 G/DL (ref 12–17)
LYMPHOCYTES # BLD AUTO: 2.16 THOUSANDS/ΜL (ref 0.6–4.47)
LYMPHOCYTES NFR BLD AUTO: 36 % (ref 14–44)
MCH RBC QN AUTO: 31.1 PG (ref 26.8–34.3)
MCHC RBC AUTO-ENTMCNC: 35 G/DL (ref 31.4–37.4)
MCV RBC AUTO: 89 FL (ref 82–98)
MONOCYTES # BLD AUTO: 0.54 THOUSAND/ΜL (ref 0.17–1.22)
MONOCYTES NFR BLD AUTO: 9 % (ref 4–12)
NEUTROPHILS # BLD AUTO: 3.12 THOUSANDS/ΜL (ref 1.85–7.62)
NEUTS SEG NFR BLD AUTO: 51 % (ref 43–75)
PHOSPHATE SERPL-MCNC: 3.3 MG/DL (ref 2.7–4.5)
PLATELET # BLD AUTO: 216 THOUSANDS/UL (ref 149–390)
PMV BLD AUTO: 9.7 FL (ref 8.9–12.7)
POTASSIUM SERPL-SCNC: 4.1 MMOL/L (ref 3.5–5.3)
PROT UR-MCNC: 78 MG/DL
PROT/CREAT UR: 0.4 MG/G{CREAT} (ref 0–0.1)
PTH-INTACT SERPL-MCNC: 43.7 PG/ML (ref 18.4–80.1)
RBC # BLD AUTO: 4.76 MILLION/UL (ref 3.88–5.62)
SODIUM SERPL-SCNC: 142 MMOL/L (ref 136–145)
WBC # BLD AUTO: 6.04 THOUSAND/UL (ref 4.31–10.16)

## 2018-03-30 PROCEDURE — 80069 RENAL FUNCTION PANEL: CPT

## 2018-03-30 PROCEDURE — 82570 ASSAY OF URINE CREATININE: CPT

## 2018-03-30 PROCEDURE — 83970 ASSAY OF PARATHORMONE: CPT

## 2018-03-30 PROCEDURE — 85025 COMPLETE CBC W/AUTO DIFF WBC: CPT

## 2018-03-30 PROCEDURE — 36415 COLL VENOUS BLD VENIPUNCTURE: CPT

## 2018-03-30 PROCEDURE — 84156 ASSAY OF PROTEIN URINE: CPT

## 2018-04-11 ENCOUNTER — OFFICE VISIT (OUTPATIENT)
Dept: NEPHROLOGY | Facility: CLINIC | Age: 48
End: 2018-04-11
Payer: COMMERCIAL

## 2018-04-11 VITALS
HEIGHT: 66 IN | HEART RATE: 80 BPM | SYSTOLIC BLOOD PRESSURE: 143 MMHG | WEIGHT: 246.6 LBS | DIASTOLIC BLOOD PRESSURE: 83 MMHG | BODY MASS INDEX: 39.63 KG/M2

## 2018-04-11 DIAGNOSIS — I10 ESSENTIAL HYPERTENSION: ICD-10-CM

## 2018-04-11 DIAGNOSIS — N18.30 CHRONIC KIDNEY DISEASE (CKD), STAGE III (MODERATE) (HCC): Primary | ICD-10-CM

## 2018-04-11 DIAGNOSIS — N02.2 MEMBRANOUS NEPHROPATHY DETERMINED BY BIOPSY: ICD-10-CM

## 2018-04-11 PROCEDURE — 99214 OFFICE O/P EST MOD 30 MIN: CPT | Performed by: INTERNAL MEDICINE

## 2018-04-11 NOTE — PROGRESS NOTES
OFFICE FOLLOW UP - Nephrology   Joshua Farrell 52 y o  male MRN: [id number]       ASSESSMENT and PLAN:  Cy Arnett was seen today for follow-up  Diagnoses and all orders for this visit:    Chronic kidney disease (CKD), stage III (moderate)  -     Protein / creatinine ratio, urine; Future  -     Renal function panel; Future    Essential hypertension    Membranous nephropathy determined by biopsy  -     Protein / creatinine ratio, urine; Future  -     Renal function panel; Future        66-year-old gentleman with history of chronic kidney disease stage 3 secondary to biopsy-proven idiopathic membranous glomerulonephritis, previously treated with prednisone and tacrolimus, he has been off steroids since June 2015, and tacrolimus was stopped in October 2016, he returns today for a year follow-up  1   Chronic kidney disease stage IIIA, most recent creatinine is stable at 1 3, proteinuria minimal at 0 4 based on the last UPC  Continue with low-dose Ace inhibitors  I would like to repeat a UPC and a renal function panel in 6 months, if since remains stable I would like to see him back in the office in 1 year  Advised about losing weight  2   Hypertension, blood pressure fairly controlled on lisinopril low dose  3   Biopsy proving idiopathic membranous glomerulonephritis, previously treated with combination of steroids and tacrolimus, off any treatment since October 2016  Most recent proteinuria 0 4 on low-dose Ace inhibitors  4   Hemoglobin normal     5   Obesity, advised to lose weight  There are no Patient Instructions on file for this visit  HPI: Joshua Farrell is a 52 y o  male who is here for Follow-up    Last time seen in our office was in April 2017, today he returns for the year follow-up  Since our last visit, there has been no ER visits or hospitilizations  Patient currently has no complaints at this time and is feeling well  Patient denies any chest pain, shortness of breath and swelling  The last blood work was done on 03/30/2018, which we have reviewed together  Denies any urinary problems, no significant foamy urine, no chest pain or shortness of breath, no lower extremity edema  His weight increase 11 lb since last office visit  ROS:   All the systems were reviewed and were negative except as documented on the HPI  Allergies: Molds & smuts    Medications:   Current Outpatient Prescriptions:     lisinopril (ZESTRIL) 10 mg tablet, Take 10 mg by mouth daily, Disp: , Rfl:     Cholecalciferol (VITAMIN D3) 22599 UNITS TABS, Take by mouth, Disp: , Rfl:     Past Medical History:   Diagnosis Date    [facility]     Dyslipidemia     Hemochromatosis     Hemorrhoids     Hypertension     Membranous glomerulonephritis     Proteinuria     [facility]monary embolus ([facility] [address] [facility]     Renal vein thrombosis ([facility] )      History reviewed  No pertinent surgical history  Family History   Problem Relation Age of Onset    No Known Problems Mother     Heart disease Father     Hypertension Father     No Known Problems Sister     No Known Problems Brother       reports that he has never smoked  He has never used smokeless tobacco  He reports that he drinks alcohol  He reports that he does not use drugs  Physical Exam:   Vitals:    04/11/18 1526   BP: 143/83   BP Location: Left arm   Patient Position: Sitting   Pulse: 80   Weight: 112 kg (246 lb 9 6 oz)   Height: 5' 6" (1 676 m)     Body mass index is 39 8 kg/m²      General: cooperative, in not acute distress  Eyes: conjunctivae pink, anicteric sclerae  ENT: lips and mucous membranes moist  Neck: supple, no JVD  Chest: clear breath sounds bilateral, no crackles, ronchus or wheezings  CVS: distinct S1 & S2, normal rate, regular rhythm  Abdomen: soft, non-tender, non-distended, normoactive bowel sounds  Extremities: no edema of both legs  Skin: no rash  Neuro: awake, alert, oriented      Lab Results:  Results for orders placed or performed in visit on 03/30/18   PTH, intact   Result Value Ref Range    PTH 43 7 18 4 - 80 1 pg/mL   Renal function panel   Result Value Ref Range    Albumin 3 5 3 5 - 5 0 g/dL    Calcium 8 8 8 3 - 10 1 mg/dL    Phosphorus 3 3 2 7 - 4 5 mg/dL    BUN 19 5 - 25 mg/dL    Creatinine 1 33 (H) 0 60 - 1 30 mg/dL    Sodium 142 136 - 145 mmol/L    Potassium 4 1 3 5 - 5 3 mmol/L    Chloride 108 100 - 108 mmol/L    CO2 26 21 - 32 mmol/L    Anion Gap 8 4 - 13 mmol/L    eGFR 63 ml/min/1 73sq m    Glucose, Fasting 88 65 - 99 mg/dL   Protein / creatinine ratio, urine   Result Value Ref Range    Creatinine, Ur 196 0 mg/dL    Protein Urine Random 78 mg/dL    Prot/Creat Ratio, Ur 0 40 (H) 0 00 - 0 10   CBC and differential   Result Value Ref Range    WBC 6 04 4 31 - 10 16 Thousand/uL    RBC 4 76 3 88 - 5 62 Million/uL    Hemoglobin 14 8 12 0 - 17 0 g/dL    Hematocrit 42 3 36 5 - 49 3 %    MCV 89 82 - 98 fL    MCH 31 1 26 8 - 34 3 pg    MCHC 35 0 31 4 - 37 4 g/dL    RDW 13 1 11 6 - 15 1 %    MPV 9 7 8 9 - 12 7 fL    Platelets 816 521 - 064 Thousands/uL    Neutrophils Relative 51 43 - 75 %    Lymphocytes Relative 36 14 - 44 %    Monocytes Relative 9 4 - 12 %    Eosinophils Relative 3 0 - 6 %    Basophils Relative 1 0 - 1 %    Neutrophils Absolute 3 12 1 85 - 7 62 Thousands/µL    Lymphocytes Absolute 2 16 0 60 - 4 47 Thousands/µL    Monocytes Absolute 0 54 0 17 - 1 22 Thousand/µL    Eosinophils Absolute 0 17 0 00 - 0 61 Thousand/µL    Basophils Absolute 0 05 0 00 - 0 10 Thousands/µL               Portions of the record may have been created with voice recognition software  Occasional wrong word or "sound a like" substitutions may have occurred due to the inherent limitations of voice recognition software  Read the chart carefully and recognize, using context, where substitutions have occurred  If you have any questions, please contact the dictating provider

## 2018-04-11 NOTE — PATIENT INSTRUCTIONS
I would like to repeat a urine and blood tests in 6 months, if renal function remains stable I would like to see you back in the office in 6 months  Advised to lose weight  Avoid NSAIDs  Recommend to follow a healthy lifestyle including following a low-salt diet, avoid smoking, do regular physical activity, avoid anti-inflammatory/NSAIDs medication (no ibuprofen, Motrin, Advil, naproxen), keep a good weight and to continue with regular follow-up as instructed  Hypertension and diabetes are the two most common causes of chronic kidney disease (CKD), if you have any of them, it is important to have a good blood sugar (hemoglobin A1c less than 7 percent) as well as good blood pressure control to slow down the progression of your CKD

## 2018-04-25 ENCOUNTER — OFFICE VISIT (OUTPATIENT)
Dept: INTERNAL MEDICINE CLINIC | Facility: CLINIC | Age: 48
End: 2018-04-25
Payer: COMMERCIAL

## 2018-04-25 VITALS
SYSTOLIC BLOOD PRESSURE: 124 MMHG | TEMPERATURE: 98.3 F | WEIGHT: 242.6 LBS | OXYGEN SATURATION: 98 % | DIASTOLIC BLOOD PRESSURE: 90 MMHG | HEART RATE: 91 BPM | BODY MASS INDEX: 39.16 KG/M2

## 2018-04-25 DIAGNOSIS — J01.00 ACUTE NON-RECURRENT MAXILLARY SINUSITIS: Primary | ICD-10-CM

## 2018-04-25 PROCEDURE — 99213 OFFICE O/P EST LOW 20 MIN: CPT | Performed by: NURSE PRACTITIONER

## 2018-04-25 RX ORDER — AMOXICILLIN AND CLAVULANATE POTASSIUM 875; 125 MG/1; MG/1
1 TABLET, FILM COATED ORAL EVERY 12 HOURS SCHEDULED
Qty: 14 TABLET | Refills: 0 | Status: SHIPPED | OUTPATIENT
Start: 2018-04-25 | End: 2018-05-02

## 2018-04-25 RX ORDER — LISINOPRIL 5 MG/1
TABLET ORAL
COMMUNITY
Start: 2018-04-18 | End: 2018-07-17 | Stop reason: SDUPTHER

## 2018-04-25 NOTE — PROGRESS NOTES
Assessment/Plan:       Diagnoses and all orders for this visit:    Acute non-recurrent maxillary sinusitis  -     amoxicillin-clavulanate (AUGMENTIN) 875-125 mg per tablet; Take 1 tablet by mouth every 12 (twelve) hours for 7 days    Other orders  -     lisinopril (ZESTRIL) 5 mg tablet;           Subjective:      Patient ID: Wild Comer is a 50 y o  male  Since Monday has been having nasal congestion, pressure, sore throat    Green mucous        The following portions of the patient's history were reviewed and updated as appropriate: allergies, current medications, past family history, past medical history, past social history, past surgical history and problem list     Review of Systems   Constitutional: Negative  HENT: Positive for postnasal drip, sinus pain, sinus pressure and sore throat  Eyes: Negative  Respiratory: Positive for cough  Cardiovascular: Negative  Gastrointestinal: Negative  Musculoskeletal: Negative  Neurological: Negative  Objective:      /90   Pulse 91   Temp 98 3 °F (36 8 °C) (Oral)   Wt 110 kg (242 lb 9 6 oz)   SpO2 98%   BMI 39 16 kg/m²          Physical Exam   Constitutional: He is oriented to person, place, and time  He appears well-developed and well-nourished  HENT:   Head: Normocephalic and atraumatic  Eyes: Conjunctivae are normal  Pupils are equal, round, and reactive to light  Neck: Normal range of motion  Neck supple  Cardiovascular: Normal rate and regular rhythm  Pulmonary/Chest: Effort normal and breath sounds normal    Abdominal: Soft  Bowel sounds are normal    Musculoskeletal: Normal range of motion  Neurological: He is alert and oriented to person, place, and time  Skin: Skin is warm and dry

## 2018-06-01 ENCOUNTER — TELEPHONE (OUTPATIENT)
Dept: UROLOGY | Facility: AMBULATORY SURGERY CENTER | Age: 48
End: 2018-06-01

## 2018-06-01 NOTE — TELEPHONE ENCOUNTER
Spoke with patient and informed that we do not need a repeat PSA done at this time  Patient does not feel like an appointment at this time is necessary  Patient knows to call if he has any urinary symptoms or questions

## 2018-06-01 NOTE — TELEPHONE ENCOUNTER
Patient managed by Dr Itz Anne last seen August 2016 for prostate cancer screening and varicocele  Patient called office as requested by PCP since his most recent PSA on 5/22/18 is up to 0 9  His PSA on 5/19/17 was 0 3  Spoke with patient and he does not report being ill at the time of the most recent PSA being drawn  Would you like to repeat PSA in 4-6 wks and proceed from there?

## 2018-07-17 DIAGNOSIS — N05.2 MEMBRANOUS GLOMERULONEPHRITIS: Primary | ICD-10-CM

## 2018-07-17 RX ORDER — LISINOPRIL 5 MG/1
TABLET ORAL
Qty: 90 TABLET | Refills: 3 | Status: SHIPPED | OUTPATIENT
Start: 2018-07-17 | End: 2019-07-16 | Stop reason: SDUPTHER

## 2018-09-26 DIAGNOSIS — E66.9 OBESITY: ICD-10-CM

## 2018-09-26 DIAGNOSIS — Z00.00 ENCOUNTER FOR GENERAL ADULT MEDICAL EXAMINATION WITHOUT ABNORMAL FINDINGS: ICD-10-CM

## 2018-09-27 ENCOUNTER — OFFICE VISIT (OUTPATIENT)
Dept: INTERNAL MEDICINE CLINIC | Facility: CLINIC | Age: 48
End: 2018-09-27
Payer: COMMERCIAL

## 2018-09-27 VITALS
HEART RATE: 74 BPM | OXYGEN SATURATION: 98 % | SYSTOLIC BLOOD PRESSURE: 124 MMHG | BODY MASS INDEX: 35.1 KG/M2 | WEIGHT: 237 LBS | DIASTOLIC BLOOD PRESSURE: 88 MMHG | HEIGHT: 69 IN

## 2018-09-27 DIAGNOSIS — Z12.5 SCREENING PSA (PROSTATE SPECIFIC ANTIGEN): Primary | ICD-10-CM

## 2018-09-27 DIAGNOSIS — Z00.00 WELLNESS EXAMINATION: ICD-10-CM

## 2018-09-27 DIAGNOSIS — Z13.6 SCREENING FOR CARDIOVASCULAR CONDITION: ICD-10-CM

## 2018-09-27 DIAGNOSIS — Z01.00 VISION SCREEN WITHOUT ABNORMAL FINDINGS: ICD-10-CM

## 2018-09-27 PROCEDURE — 99396 PREV VISIT EST AGE 40-64: CPT | Performed by: INTERNAL MEDICINE

## 2018-09-27 NOTE — PROGRESS NOTES
Assessment/Plan:           Problem List Items Addressed This Visit        Other    Wellness examination     Assessment and plan 1  Health maintenance annual wellness examination overall the patient is clinically stable and doing well, we encouraged the patient to follow a healthy and balanced diet  We recommend that the patient exercise routinely approximately 30 minutes 5 times per week   We have reviewed the patient's vaccines and have made recommendations for updates if necessary   flu vaccine      We will be ordering screening laboratories which are age appropriate  Return to the office in  6 months    call if any problems  Other Visit Diagnoses     Screening PSA (prostate specific antigen)    -  Primary    Relevant Orders    PSA, Total Screen    Vision screen without abnormal findings        Relevant Orders    Ambulatory referral to Ophthalmology    Screening for cardiovascular condition        Relevant Orders    Comprehensive metabolic panel    Lipid Panel with Direct LDL reflex          Return to office 6  months  call if any problems  Recommend wearing a sunscreen routinely, exercising routinely and following a healthy balance diet  Subjective:      Patient ID: Larry Portillo is a 50 y o  male      HPI 27-year-old male coming in for annual wellness examination he reports me that he wears seat belt , wears sun screen,  Diet not as goo, increased quatiaty not exercize ( mows the law once a week ) not flossing routine not checking eye; stress better at work    The following portions of the patient's history were reviewed and updated as appropriate: allergies, current medications, past family history, past medical history, past social history, past surgical history and problem list     Review of Systems      Objective:      /88 (BP Location: Left arm, Patient Position: Sitting, Cuff Size: Large)   Pulse 74   Ht 5' 8 75" (1 746 m)   Wt 108 kg (237 lb)   SpO2 98%   BMI 35 25 kg/m² Physical Exam   Constitutional: He appears well-developed and well-nourished  No distress  HENT:   Head: Normocephalic and atraumatic  Right Ear: External ear normal    Left Ear: External ear normal    Mouth/Throat: Oropharynx is clear and moist    Eyes: Pupils are equal, round, and reactive to light  Conjunctivae are normal  Right eye exhibits no discharge  Left eye exhibits no discharge  No scleral icterus  Neck: Neck supple  Cardiovascular: Normal rate, regular rhythm and normal heart sounds  Exam reveals no gallop and no friction rub  No murmur heard  Pulmonary/Chest: No respiratory distress  He has no wheezes  He has no rales  Abdominal: Soft  Bowel sounds are normal  He exhibits no distension and no mass  There is no tenderness  There is no rebound and no guarding  Musculoskeletal: He exhibits no edema or deformity  Lymphadenopathy:     He has no cervical adenopathy  Neurological: He is alert  Skin: He is not diaphoretic  Psychiatric: He has a normal mood and affect

## 2018-09-27 NOTE — ASSESSMENT & PLAN NOTE
Assessment and plan 1  Health maintenance annual wellness examination overall the patient is clinically stable and doing well, we encouraged the patient to follow a healthy and balanced diet  We recommend that the patient exercise routinely approximately 30 minutes 5 times per week   We have reviewed the patient's vaccines and have made recommendations for updates if necessary   flu vaccine      We will be ordering screening laboratories which are age appropriate  Return to the office in  6 months    call if any problems

## 2018-12-10 ENCOUNTER — TRANSCRIBE ORDERS (OUTPATIENT)
Dept: ADMINISTRATIVE | Facility: HOSPITAL | Age: 48
End: 2018-12-10

## 2018-12-10 DIAGNOSIS — H90.6 MIXED HEARING LOSS, BILATERAL: Primary | ICD-10-CM

## 2018-12-10 DIAGNOSIS — H93.13 TINNITUS OF BOTH EARS: ICD-10-CM

## 2018-12-30 ENCOUNTER — HOSPITAL ENCOUNTER (OUTPATIENT)
Dept: MRI IMAGING | Facility: HOSPITAL | Age: 48
Discharge: HOME | End: 2018-12-30
Payer: COMMERCIAL

## 2018-12-30 DIAGNOSIS — H93.13 TINNITUS OF BOTH EARS: ICD-10-CM

## 2018-12-30 DIAGNOSIS — H90.6 MIXED HEARING LOSS, BILATERAL: ICD-10-CM

## 2018-12-30 PROCEDURE — 70551 MRI BRAIN STEM W/O DYE: CPT

## 2019-02-11 ENCOUNTER — TELEPHONE (OUTPATIENT)
Dept: NEPHROLOGY | Facility: CLINIC | Age: 49
End: 2019-02-11

## 2019-02-15 ENCOUNTER — TELEPHONE (OUTPATIENT)
Dept: NEPHROLOGY | Facility: CLINIC | Age: 49
End: 2019-02-15

## 2019-04-03 ENCOUNTER — OFFICE VISIT (OUTPATIENT)
Dept: INTERNAL MEDICINE CLINIC | Facility: CLINIC | Age: 49
End: 2019-04-03
Payer: COMMERCIAL

## 2019-04-03 VITALS
WEIGHT: 244.6 LBS | DIASTOLIC BLOOD PRESSURE: 78 MMHG | HEIGHT: 66 IN | SYSTOLIC BLOOD PRESSURE: 122 MMHG | RESPIRATION RATE: 16 BRPM | BODY MASS INDEX: 39.31 KG/M2 | HEART RATE: 83 BPM | TEMPERATURE: 98.7 F | OXYGEN SATURATION: 98 %

## 2019-04-03 DIAGNOSIS — Z00.00 HEALTHCARE MAINTENANCE: ICD-10-CM

## 2019-04-03 DIAGNOSIS — N18.30 CHRONIC KIDNEY DISEASE (CKD), STAGE III (MODERATE) (HCC): Primary | ICD-10-CM

## 2019-04-03 DIAGNOSIS — I10 ESSENTIAL HYPERTENSION: ICD-10-CM

## 2019-04-03 DIAGNOSIS — H91.93 DECREASED HEARING OF BOTH EARS: ICD-10-CM

## 2019-04-03 PROCEDURE — 99396 PREV VISIT EST AGE 40-64: CPT | Performed by: NURSE PRACTITIONER

## 2019-04-09 PROBLEM — H91.93 DECREASED HEARING OF BOTH EARS: Status: ACTIVE | Noted: 2019-04-09

## 2019-04-15 ENCOUNTER — TELEPHONE (OUTPATIENT)
Dept: NEPHROLOGY | Facility: CLINIC | Age: 49
End: 2019-04-15

## 2019-04-16 DIAGNOSIS — N18.30 CHRONIC KIDNEY DISEASE (CKD), STAGE III (MODERATE) (HCC): Primary | ICD-10-CM

## 2019-05-02 ENCOUNTER — TRANSCRIBE ORDERS (OUTPATIENT)
Dept: LAB | Facility: CLINIC | Age: 49
End: 2019-05-02

## 2019-05-02 ENCOUNTER — APPOINTMENT (OUTPATIENT)
Dept: LAB | Facility: CLINIC | Age: 49
End: 2019-05-02
Payer: COMMERCIAL

## 2019-05-02 DIAGNOSIS — N18.30 CHRONIC KIDNEY DISEASE (CKD), STAGE III (MODERATE) (HCC): ICD-10-CM

## 2019-05-02 LAB
ALBUMIN SERPL BCP-MCNC: 3.9 G/DL (ref 3.5–5)
ANION GAP SERPL CALCULATED.3IONS-SCNC: 13 MMOL/L (ref 4–13)
BUN SERPL-MCNC: 24 MG/DL (ref 5–25)
CALCIUM SERPL-MCNC: 9.3 MG/DL (ref 8.3–10.1)
CHLORIDE SERPL-SCNC: 107 MMOL/L (ref 100–108)
CO2 SERPL-SCNC: 23 MMOL/L (ref 21–32)
CREAT SERPL-MCNC: 1.5 MG/DL (ref 0.6–1.3)
CREAT UR-MCNC: 304 MG/DL
GFR SERPL CREATININE-BSD FRML MDRD: 54 ML/MIN/1.73SQ M
GLUCOSE SERPL-MCNC: 109 MG/DL (ref 65–140)
PHOSPHATE SERPL-MCNC: 3.3 MG/DL (ref 2.7–4.5)
POTASSIUM SERPL-SCNC: 4 MMOL/L (ref 3.5–5.3)
PROT UR-MCNC: 160 MG/DL
PROT/CREAT UR: 0.53 MG/G{CREAT} (ref 0–0.1)
SODIUM SERPL-SCNC: 143 MMOL/L (ref 136–145)

## 2019-05-02 PROCEDURE — 36415 COLL VENOUS BLD VENIPUNCTURE: CPT

## 2019-05-02 PROCEDURE — 84156 ASSAY OF PROTEIN URINE: CPT

## 2019-05-02 PROCEDURE — 80069 RENAL FUNCTION PANEL: CPT

## 2019-05-02 PROCEDURE — 82570 ASSAY OF URINE CREATININE: CPT

## 2019-05-06 ENCOUNTER — TELEPHONE (OUTPATIENT)
Dept: NEPHROLOGY | Facility: CLINIC | Age: 49
End: 2019-05-06

## 2019-05-07 ENCOUNTER — OFFICE VISIT (OUTPATIENT)
Dept: NEPHROLOGY | Facility: CLINIC | Age: 49
End: 2019-05-07
Payer: COMMERCIAL

## 2019-05-07 VITALS
DIASTOLIC BLOOD PRESSURE: 82 MMHG | WEIGHT: 241.8 LBS | SYSTOLIC BLOOD PRESSURE: 128 MMHG | BODY MASS INDEX: 34.62 KG/M2 | HEIGHT: 70 IN | HEART RATE: 74 BPM

## 2019-05-07 DIAGNOSIS — I10 ESSENTIAL HYPERTENSION: ICD-10-CM

## 2019-05-07 DIAGNOSIS — N02.2 MEMBRANOUS NEPHROPATHY DETERMINED BY BIOPSY: ICD-10-CM

## 2019-05-07 DIAGNOSIS — N18.30 CHRONIC KIDNEY DISEASE (CKD), STAGE III (MODERATE) (HCC): Primary | ICD-10-CM

## 2019-05-07 PROCEDURE — 99214 OFFICE O/P EST MOD 30 MIN: CPT | Performed by: INTERNAL MEDICINE

## 2019-05-26 ENCOUNTER — HOSPITAL ENCOUNTER (EMERGENCY)
Facility: HOSPITAL | Age: 49
Discharge: HOME | End: 2019-05-26
Attending: EMERGENCY MEDICINE | Admitting: EMERGENCY MEDICINE
Payer: COMMERCIAL

## 2019-05-26 ENCOUNTER — APPOINTMENT (OUTPATIENT)
Dept: RADIOLOGY | Age: 49
End: 2019-05-26
Attending: FAMILY MEDICINE
Payer: COMMERCIAL

## 2019-05-26 ENCOUNTER — OFFICE VISIT (OUTPATIENT)
Dept: URGENT CARE | Age: 49
End: 2019-05-26
Payer: COMMERCIAL

## 2019-05-26 VITALS
RESPIRATION RATE: 18 BRPM | DIASTOLIC BLOOD PRESSURE: 90 MMHG | HEART RATE: 73 BPM | SYSTOLIC BLOOD PRESSURE: 139 MMHG | OXYGEN SATURATION: 97 %

## 2019-05-26 VITALS
TEMPERATURE: 98.6 F | DIASTOLIC BLOOD PRESSURE: 80 MMHG | HEIGHT: 70 IN | BODY MASS INDEX: 34.36 KG/M2 | HEART RATE: 76 BPM | SYSTOLIC BLOOD PRESSURE: 138 MMHG | OXYGEN SATURATION: 99 % | WEIGHT: 240 LBS | RESPIRATION RATE: 18 BRPM

## 2019-05-26 DIAGNOSIS — S50.11XA CONTUSION OF RIGHT FOREARM, INITIAL ENCOUNTER: Primary | ICD-10-CM

## 2019-05-26 DIAGNOSIS — M79.632 LEFT FOREARM PAIN: ICD-10-CM

## 2019-05-26 DIAGNOSIS — R10.9 ABDOMINAL PAIN: Primary | ICD-10-CM

## 2019-05-26 DIAGNOSIS — L55.9 SUNBURN: ICD-10-CM

## 2019-05-26 LAB
ALBUMIN SERPL BCP-MCNC: 3.5 G/DL (ref 3.5–5)
ALP SERPL-CCNC: 77 U/L (ref 46–116)
ALT SERPL W P-5'-P-CCNC: 28 U/L (ref 12–78)
ANION GAP SERPL CALCULATED.3IONS-SCNC: 10 MMOL/L (ref 4–13)
AST SERPL W P-5'-P-CCNC: 20 U/L (ref 5–45)
BASOPHILS # BLD AUTO: 0.09 THOUSANDS/ΜL (ref 0–0.1)
BASOPHILS NFR BLD AUTO: 1 % (ref 0–1)
BILIRUB SERPL-MCNC: 0.8 MG/DL (ref 0.2–1)
BUN SERPL-MCNC: 21 MG/DL (ref 5–25)
CALCIUM SERPL-MCNC: 8.6 MG/DL (ref 8.3–10.1)
CHLORIDE SERPL-SCNC: 104 MMOL/L (ref 100–108)
CO2 SERPL-SCNC: 25 MMOL/L (ref 21–32)
CREAT SERPL-MCNC: 1.38 MG/DL (ref 0.6–1.3)
EOSINOPHIL # BLD AUTO: 0.12 THOUSAND/ΜL (ref 0–0.61)
EOSINOPHIL NFR BLD AUTO: 2 % (ref 0–6)
ERYTHROCYTE [DISTWIDTH] IN BLOOD BY AUTOMATED COUNT: 13 % (ref 11.6–15.1)
GFR SERPL CREATININE-BSD FRML MDRD: 60 ML/MIN/1.73SQ M
GLUCOSE SERPL-MCNC: 84 MG/DL (ref 65–140)
HCT VFR BLD AUTO: 40.7 % (ref 36.5–49.3)
HGB BLD-MCNC: 14.1 G/DL (ref 12–17)
IMM GRANULOCYTES # BLD AUTO: 0.02 THOUSAND/UL (ref 0–0.2)
IMM GRANULOCYTES NFR BLD AUTO: 0 % (ref 0–2)
LIPASE SERPL-CCNC: 173 U/L (ref 73–393)
LYMPHOCYTES # BLD AUTO: 2.07 THOUSANDS/ΜL (ref 0.6–4.47)
LYMPHOCYTES NFR BLD AUTO: 27 % (ref 14–44)
MCH RBC QN AUTO: 31.2 PG (ref 26.8–34.3)
MCHC RBC AUTO-ENTMCNC: 34.6 G/DL (ref 31.4–37.4)
MCV RBC AUTO: 90 FL (ref 82–98)
MONOCYTES # BLD AUTO: 0.76 THOUSAND/ΜL (ref 0.17–1.22)
MONOCYTES NFR BLD AUTO: 10 % (ref 4–12)
NEUTROPHILS # BLD AUTO: 4.52 THOUSANDS/ΜL (ref 1.85–7.62)
NEUTS SEG NFR BLD AUTO: 60 % (ref 43–75)
NRBC BLD AUTO-RTO: 0 /100 WBCS
PLATELET # BLD AUTO: 227 THOUSANDS/UL (ref 149–390)
PMV BLD AUTO: 9.6 FL (ref 8.9–12.7)
POTASSIUM SERPL-SCNC: 3.5 MMOL/L (ref 3.5–5.3)
PROT SERPL-MCNC: 6.8 G/DL (ref 6.4–8.2)
RBC # BLD AUTO: 4.52 MILLION/UL (ref 3.88–5.62)
SODIUM SERPL-SCNC: 139 MMOL/L (ref 136–145)
WBC # BLD AUTO: 7.58 THOUSAND/UL (ref 4.31–10.16)

## 2019-05-26 PROCEDURE — S9083 URGENT CARE CENTER GLOBAL: HCPCS | Performed by: NURSE PRACTITIONER

## 2019-05-26 PROCEDURE — 85025 COMPLETE CBC W/AUTO DIFF WBC: CPT | Performed by: PHYSICIAN ASSISTANT

## 2019-05-26 PROCEDURE — 73090 X-RAY EXAM OF FOREARM: CPT

## 2019-05-26 PROCEDURE — G0382 LEV 3 HOSP TYPE B ED VISIT: HCPCS | Performed by: NURSE PRACTITIONER

## 2019-05-26 PROCEDURE — 80053 COMPREHEN METABOLIC PANEL: CPT | Performed by: PHYSICIAN ASSISTANT

## 2019-05-26 PROCEDURE — 96360 HYDRATION IV INFUSION INIT: CPT

## 2019-05-26 PROCEDURE — 99284 EMERGENCY DEPT VISIT MOD MDM: CPT | Performed by: PHYSICIAN ASSISTANT

## 2019-05-26 PROCEDURE — 99284 EMERGENCY DEPT VISIT MOD MDM: CPT

## 2019-05-26 PROCEDURE — 36415 COLL VENOUS BLD VENIPUNCTURE: CPT | Performed by: PHYSICIAN ASSISTANT

## 2019-05-26 PROCEDURE — 83690 ASSAY OF LIPASE: CPT | Performed by: PHYSICIAN ASSISTANT

## 2019-05-26 RX ORDER — FAMOTIDINE 20 MG/1
20 TABLET, FILM COATED ORAL 2 TIMES DAILY
Qty: 30 TABLET | Refills: 0 | Status: SHIPPED | OUTPATIENT
Start: 2019-05-26 | End: 2019-07-19 | Stop reason: ALTCHOICE

## 2019-05-26 RX ADMIN — SODIUM CHLORIDE 1000 ML: 0.9 INJECTION, SOLUTION INTRAVENOUS at 21:47

## 2019-07-16 DIAGNOSIS — N05.2 MEMBRANOUS GLOMERULONEPHRITIS: ICD-10-CM

## 2019-07-16 RX ORDER — LISINOPRIL 5 MG/1
TABLET ORAL
Qty: 90 TABLET | Refills: 3 | Status: SHIPPED | OUTPATIENT
Start: 2019-07-16 | End: 2020-05-20 | Stop reason: DRUGHIGH

## 2019-07-19 ENCOUNTER — APPOINTMENT (OUTPATIENT)
Dept: LAB | Age: 49
End: 2019-07-19
Attending: PREVENTIVE MEDICINE

## 2019-07-19 ENCOUNTER — OFFICE VISIT (OUTPATIENT)
Dept: INTERNAL MEDICINE CLINIC | Facility: CLINIC | Age: 49
End: 2019-07-19
Payer: COMMERCIAL

## 2019-07-19 ENCOUNTER — TRANSCRIBE ORDERS (OUTPATIENT)
Dept: ADMINISTRATIVE | Age: 49
End: 2019-07-19

## 2019-07-19 VITALS
WEIGHT: 237.6 LBS | BODY MASS INDEX: 34.01 KG/M2 | HEIGHT: 70 IN | SYSTOLIC BLOOD PRESSURE: 124 MMHG | HEART RATE: 64 BPM | DIASTOLIC BLOOD PRESSURE: 88 MMHG | RESPIRATION RATE: 16 BRPM | OXYGEN SATURATION: 98 %

## 2019-07-19 DIAGNOSIS — Z02.1 PRE-EMPLOYMENT HEALTH SCREENING EXAMINATION: Primary | ICD-10-CM

## 2019-07-19 DIAGNOSIS — Z00.00 WELLNESS EXAMINATION: Primary | ICD-10-CM

## 2019-07-19 DIAGNOSIS — Z12.11 SCREENING FOR MALIGNANT NEOPLASM OF COLON: ICD-10-CM

## 2019-07-19 DIAGNOSIS — Z01.00 ENCOUNTER FOR VISION SCREENING: ICD-10-CM

## 2019-07-19 DIAGNOSIS — Z02.1 PRE-EMPLOYMENT HEALTH SCREENING EXAMINATION: ICD-10-CM

## 2019-07-19 LAB
CHOLEST SERPL-MCNC: 197 MG/DL (ref 50–200)
EST. AVERAGE GLUCOSE BLD GHB EST-MCNC: 103 MG/DL
HBA1C MFR BLD: 5.2 % (ref 4.2–6.3)
HDLC SERPL-MCNC: 39 MG/DL (ref 40–60)
LDLC SERPL CALC-MCNC: 133 MG/DL (ref 0–100)
NONHDLC SERPL-MCNC: 158 MG/DL
PSA SERPL-MCNC: 0.4 NG/ML (ref 0–4)
TRIGL SERPL-MCNC: 125 MG/DL

## 2019-07-19 PROCEDURE — G0103 PSA SCREENING: HCPCS

## 2019-07-19 PROCEDURE — 36415 COLL VENOUS BLD VENIPUNCTURE: CPT

## 2019-07-19 PROCEDURE — 99396 PREV VISIT EST AGE 40-64: CPT | Performed by: INTERNAL MEDICINE

## 2019-07-19 PROCEDURE — 80061 LIPID PANEL: CPT

## 2019-07-19 PROCEDURE — 83036 HEMOGLOBIN GLYCOSYLATED A1C: CPT

## 2019-07-19 NOTE — PROGRESS NOTES
BMI Counseling: Body mass index is 34 09 kg/m²  Discussed the patient's BMI with him  The BMI is above average  BMI counseling and education was provided to the patient  Nutrition recommendations include reducing portion sizes  Assessment/Plan:    Wellness examination  Assessment and plan 1  Health maintenance annual wellness examination overall the patient is clinically stable and doing well, we encouraged the patient to follow a healthy and balanced diet  We recommend that the patient exercise routinely approximately 30 minutes 5 times per week   We have reviewed the patient's vaccines and have made recommendations for updates if necessary recommend flu vaccine in the fall, consider the shingles vaccine when it 48years old       We will be ordering screening laboratories which are age appropriate  Return to the office in   1 year   call if any problems  Problem List Items Addressed This Visit        Other    Wellness examination - Primary     Assessment and plan 1  Health maintenance annual wellness examination overall the patient is clinically stable and doing well, we encouraged the patient to follow a healthy and balanced diet  We recommend that the patient exercise routinely approximately 30 minutes 5 times per week   We have reviewed the patient's vaccines and have made recommendations for updates if necessary recommend flu vaccine in the fall, consider the shingles vaccine when it 48years old       We will be ordering screening laboratories which are age appropriate  Return to the office in   1 year   call if any problems  Other Visit Diagnoses     Screening for malignant neoplasm of colon        Relevant Orders    Ambulatory referral to Gastroenterology    Encounter for vision screening        Relevant Orders    Ambulatory referral to Ophthalmology          Return to office  12 months  call if any problems  Subjective:      Patient ID: Juan Pablo Smiley is a 52 y o  male     HPI 51-year-old male coming in for a wellness examination he reports me he could be doing a better job with diet and exercise; he does see the dentist routinely,  he reports now has hearing aids , saw  ENT Dr Chaparrita Womack,  Diet could be better- portion contion , planst to go for eye exam, dental Q 6month seat belt wears    The following portions of the patient's history were reviewed and updated as appropriate: allergies, current medications, past family history, past medical history, past social history, past surgical history and problem list     Review of Systems   Constitutional: Negative for activity change, appetite change and unexpected weight change  HENT: Negative for congestion and postnasal drip  Eyes: Negative for visual disturbance  Respiratory: Negative for cough and shortness of breath  Cardiovascular: Negative for chest pain  Gastrointestinal: Negative for abdominal pain, diarrhea, nausea and vomiting  Neurological: Negative for dizziness, light-headedness and headaches  Objective:    No follow-ups on file  No results found  Allergies   Allergen Reactions    Molds & Smuts        Past Medical History:   Diagnosis Date    CAP (community acquired pneumonia)     Last assessed - 3/20/13    [facility] [address] [facility]     Dyslipidemia     Hemochromatosis     Hemorrhoids     Hypertension     Membranous glomerulonephritis     Proteinuria     Pulmonary embolus [facility] [address] [facility]     Renal vein thrombosis (HCC)      Past Surgical History:   Procedure Laterality Date    ADENOIDECTOMY      HEMORRHOID SURGERY      VASECTOMY       Current Outpatient Medications on File Prior to Visit   Medication Sig Dispense Refill    lisinopril (ZESTRIL) 5 mg tablet TAKE 1 TABLET DAILY 90 tablet 3     No current facility-administered medications on file prior to visit        Family History   Problem Relation Age of Onset    Heart disease Mother     Heart disease Father    Reny Ward Hypertension Father     Coronary artery disease Father     Nephrolithiasis Father     No Known Problems Sister     No Known Problems Brother      Social History     Socioeconomic History    Marital status: /Civil Union     Spouse name: Not on file    Number of children: Not on file    Years of education: Not on file    Highest education level: Not on file   Occupational History    Not on file   Social Needs    Financial resource strain: Not on file    Food insecurity:     Worry: Not on file     Inability: Not on file    Transportation needs:     Medical: Not on file     Non-medical: Not on file   Tobacco Use    Smoking status: Never Smoker    Smokeless tobacco: Never Used   Substance and Sexual Activity    Alcohol use: Yes     Comment: socialy;  Never drank alcohol per [employer]     Drug use: No    Sexual activity: Not on file   Lifestyle    Physical activity:     Days per week: Not on file     Minutes per session: Not on file    Stress: Not on file   Relationships    Social connections:     Talks on phone: Not on file     Gets together: Not on file     Attends [employer] service: Not on file     Active member of club or organization: Not on file     Attends meetings of clubs or organizations: Not on file     Relationship status: Not on file    Intimate partner violence:     Fear of current or ex partner: Not on file     Emotionally abused: Not on file     Physically abused: Not on file     Forced sexual activity: Not on file   Other Topics Concern    Not on file   Social History Narrative    Not on file     Vitals:    07/19/19 0854   BP: 124/88   Pulse: 64   Resp: 16   SpO2: 98%   Weight: 108 kg (237 lb 9 6 oz)   Height: 5' 10" (1 778 m)     Results for orders placed or performed during the hospital encounter of 05/26/19   CBC and differential   Result Value Ref Range    WBC 7 58 4 31 - 10 16 Thousand/uL    RBC 4 52 3 88 - 5 62 Million/uL    Hemoglobin 14 1 12 0 - 17 0 g/dL Hematocrit 40 7 36 5 - 49 3 %    MCV 90 82 - 98 fL    MCH 31 2 26 8 - 34 3 pg    MCHC 34 6 31 4 - 37 4 g/dL    RDW 13 0 11 6 - 15 1 %    MPV 9 6 8 9 - 12 7 fL    Platelets 628 759 - 647 Thousands/uL    nRBC 0 /100 WBCs    Neutrophils Relative 60 43 - 75 %    Immat GRANS % 0 0 - 2 %    Lymphocytes Relative 27 14 - 44 %    Monocytes Relative 10 4 - 12 %    Eosinophils Relative 2 0 - 6 %    Basophils Relative 1 0 - 1 %    Neutrophils Absolute 4 52 1 85 - 7 62 Thousands/µL    Immature Grans Absolute 0 02 0 00 - 0 20 Thousand/uL    Lymphocytes Absolute 2 07 0 60 - 4 47 Thousands/µL    Monocytes Absolute 0 76 0 17 - 1 22 Thousand/µL    Eosinophils Absolute 0 12 0 00 - 0 61 Thousand/µL    Basophils Absolute 0 09 0 00 - 0 10 Thousands/µL   Comprehensive metabolic panel   Result Value Ref Range    Sodium 139 136 - 145 mmol/L    Potassium 3 5 3 5 - 5 3 mmol/L    Chloride 104 100 - 108 mmol/L    CO2 25 21 - 32 mmol/L    ANION GAP 10 4 - 13 mmol/L    BUN 21 5 - 25 mg/dL    Creatinine 1 38 (H) 0 60 - 1 30 mg/dL    Glucose 84 65 - 140 mg/dL    Calcium 8 6 8 3 - 10 1 mg/dL    AST 20 5 - 45 U/L    ALT 28 12 - 78 U/L    Alkaline Phosphatase 77 46 - 116 U/L    Total Protein 6 8 6 4 - 8 2 g/dL    Albumin 3 5 3 5 - 5 0 g/dL    Total Bilirubin 0 80 0 20 - 1 00 mg/dL    eGFR 60 ml/min/1 73sq m   Lipase   Result Value Ref Range    Lipase 173 73 - 393 u/L     Weight (last 2 days)     Date/Time   Weight    07/19/19 0854   108 (237 6)            Body mass index is 34 09 kg/m²  BP      Temp      Pulse     Resp      SpO2        Vitals:    07/19/19 0854   Weight: 108 kg (237 lb 9 6 oz)     Vitals:    07/19/19 0854   Weight: 108 kg (237 lb 9 6 oz)       /88   Pulse 64   Resp 16   Ht 5' 10" (1 778 m)   Wt 108 kg (237 lb 9 6 oz)   SpO2 98%   BMI 34 09 kg/m²          Physical Exam   Constitutional: He appears well-developed and well-nourished  No distress  HENT:   Head: Normocephalic and atraumatic     Right Ear: External ear normal  Left Ear: External ear normal    Mouth/Throat: Oropharynx is clear and moist    Eyes: Pupils are equal, round, and reactive to light  Conjunctivae are normal  Right eye exhibits no discharge  Left eye exhibits no discharge  No scleral icterus  Neck: Neck supple  Cardiovascular: Normal rate, regular rhythm and normal heart sounds  Exam reveals no gallop and no friction rub  No murmur heard  Pulmonary/Chest: No respiratory distress  He has no wheezes  He has no rales  Abdominal: Soft  Bowel sounds are normal  He exhibits no distension and no mass  There is no tenderness  There is no rebound and no guarding  Musculoskeletal: He exhibits no edema or deformity  Lymphadenopathy:     He has no cervical adenopathy  Neurological: He is alert  Skin: He is not diaphoretic  Psychiatric: He has a normal mood and affect

## 2019-07-21 NOTE — ASSESSMENT & PLAN NOTE
Assessment and plan 1  Health maintenance annual wellness examination overall the patient is clinically stable and doing well, we encouraged the patient to follow a healthy and balanced diet  We recommend that the patient exercise routinely approximately 30 minutes 5 times per week   We have reviewed the patient's vaccines and have made recommendations for updates if necessary recommend flu vaccine in the fall, consider the shingles vaccine when it 48years old       We will be ordering screening laboratories which are age appropriate  Return to the office in   1 year   call if any problems

## 2020-03-18 ENCOUNTER — TELEPHONE (OUTPATIENT)
Dept: NEPHROLOGY | Facility: CLINIC | Age: 50
End: 2020-03-18

## 2020-03-18 NOTE — TELEPHONE ENCOUNTER
Patient called and left a message on the voicemail, stating he would like to speak to Dr Jyotsna Elizalde regarding having CKD and the virus  He states he has has very specific questions

## 2020-03-19 NOTE — TELEPHONE ENCOUNTER
I spoke with patient and talked about general recommendations to prevent infection for COVID-19 as well as he is risk given underlying kidney disease  He expressed understanding and all his questions were answered to the best of my knowledge

## 2020-03-31 ENCOUNTER — TELEPHONE (OUTPATIENT)
Dept: NEPHROLOGY | Facility: CLINIC | Age: 50
End: 2020-03-31

## 2020-05-07 ENCOUNTER — APPOINTMENT (OUTPATIENT)
Dept: LAB | Facility: CLINIC | Age: 50
End: 2020-05-07
Payer: COMMERCIAL

## 2020-05-07 DIAGNOSIS — N18.30 CHRONIC KIDNEY DISEASE (CKD), STAGE III (MODERATE) (HCC): ICD-10-CM

## 2020-05-07 LAB
ALBUMIN SERPL BCP-MCNC: 3.8 G/DL (ref 3.5–5)
ANION GAP SERPL CALCULATED.3IONS-SCNC: 9 MMOL/L (ref 4–13)
BUN SERPL-MCNC: 19 MG/DL (ref 5–25)
CALCIUM SERPL-MCNC: 8.5 MG/DL (ref 8.3–10.1)
CHLORIDE SERPL-SCNC: 104 MMOL/L (ref 100–108)
CO2 SERPL-SCNC: 26 MMOL/L (ref 21–32)
CREAT SERPL-MCNC: 1.27 MG/DL (ref 0.6–1.3)
CREAT UR-MCNC: 47 MG/DL
ERYTHROCYTE [DISTWIDTH] IN BLOOD BY AUTOMATED COUNT: 13.2 % (ref 11.6–15.1)
GFR SERPL CREATININE-BSD FRML MDRD: 65 ML/MIN/1.73SQ M
GLUCOSE SERPL-MCNC: 81 MG/DL (ref 65–140)
HCT VFR BLD AUTO: 44.2 % (ref 36.5–49.3)
HGB BLD-MCNC: 15.2 G/DL (ref 12–17)
MCH RBC QN AUTO: 31.7 PG (ref 26.8–34.3)
MCHC RBC AUTO-ENTMCNC: 34.4 G/DL (ref 31.4–37.4)
MCV RBC AUTO: 92 FL (ref 82–98)
PHOSPHATE SERPL-MCNC: 3.5 MG/DL (ref 2.7–4.5)
PLATELET # BLD AUTO: 228 THOUSANDS/UL (ref 149–390)
PMV BLD AUTO: 9.7 FL (ref 8.9–12.7)
POTASSIUM SERPL-SCNC: 4 MMOL/L (ref 3.5–5.3)
PROT UR-MCNC: 17 MG/DL
PROT/CREAT UR: 0.36 MG/G{CREAT} (ref 0–0.1)
PTH-INTACT SERPL-MCNC: 68.1 PG/ML (ref 18.4–80.1)
RBC # BLD AUTO: 4.79 MILLION/UL (ref 3.88–5.62)
SODIUM SERPL-SCNC: 139 MMOL/L (ref 136–145)
WBC # BLD AUTO: 5.75 THOUSAND/UL (ref 4.31–10.16)

## 2020-05-07 PROCEDURE — 83970 ASSAY OF PARATHORMONE: CPT

## 2020-05-07 PROCEDURE — 84156 ASSAY OF PROTEIN URINE: CPT

## 2020-05-07 PROCEDURE — 36415 COLL VENOUS BLD VENIPUNCTURE: CPT

## 2020-05-07 PROCEDURE — 80069 RENAL FUNCTION PANEL: CPT

## 2020-05-07 PROCEDURE — 82570 ASSAY OF URINE CREATININE: CPT

## 2020-05-07 PROCEDURE — 85027 COMPLETE CBC AUTOMATED: CPT

## 2020-05-15 ENCOUNTER — TELEPHONE (OUTPATIENT)
Dept: NEPHROLOGY | Facility: CLINIC | Age: 50
End: 2020-05-15

## 2020-05-19 ENCOUNTER — TELEPHONE (OUTPATIENT)
Dept: NEPHROLOGY | Facility: CLINIC | Age: 50
End: 2020-05-19

## 2020-05-20 ENCOUNTER — TELEPHONE (OUTPATIENT)
Dept: NEPHROLOGY | Facility: CLINIC | Age: 50
End: 2020-05-20

## 2020-05-20 ENCOUNTER — TELEMEDICINE (OUTPATIENT)
Dept: NEPHROLOGY | Facility: CLINIC | Age: 50
End: 2020-05-20
Payer: COMMERCIAL

## 2020-05-20 DIAGNOSIS — I10 ESSENTIAL HYPERTENSION: ICD-10-CM

## 2020-05-20 DIAGNOSIS — N18.30 CHRONIC KIDNEY DISEASE (CKD), STAGE III (MODERATE) (HCC): Primary | ICD-10-CM

## 2020-05-20 DIAGNOSIS — N02.2 MEMBRANOUS NEPHROPATHY DETERMINED BY BIOPSY: ICD-10-CM

## 2020-05-20 PROCEDURE — 99214 OFFICE O/P EST MOD 30 MIN: CPT | Performed by: INTERNAL MEDICINE

## 2020-05-20 RX ORDER — LISINOPRIL 20 MG/1
20 TABLET ORAL DAILY
Qty: 30 TABLET | Refills: 2 | Status: SHIPPED | OUTPATIENT
Start: 2020-05-20 | End: 2020-05-21 | Stop reason: SDUPTHER

## 2020-05-21 DIAGNOSIS — N18.30 CHRONIC KIDNEY DISEASE (CKD), STAGE III (MODERATE) (HCC): ICD-10-CM

## 2020-05-21 RX ORDER — LISINOPRIL 20 MG/1
20 TABLET ORAL DAILY
Qty: 90 TABLET | Refills: 3 | Status: SHIPPED | OUTPATIENT
Start: 2020-05-21 | End: 2021-06-17 | Stop reason: SDUPTHER

## 2020-06-29 ENCOUNTER — TRANSCRIBE ORDERS (OUTPATIENT)
Dept: LAB | Facility: CLINIC | Age: 50
End: 2020-06-29

## 2020-06-29 ENCOUNTER — APPOINTMENT (OUTPATIENT)
Dept: LAB | Facility: CLINIC | Age: 50
End: 2020-06-29
Payer: COMMERCIAL

## 2020-06-29 DIAGNOSIS — N18.30 CHRONIC KIDNEY DISEASE, STAGE III (MODERATE) (HCC): Primary | ICD-10-CM

## 2020-06-29 DIAGNOSIS — N18.30 CHRONIC KIDNEY DISEASE, STAGE III (MODERATE) (HCC): ICD-10-CM

## 2020-06-29 LAB
ANION GAP SERPL CALCULATED.3IONS-SCNC: 9 MMOL/L (ref 4–13)
BUN SERPL-MCNC: 18 MG/DL (ref 5–25)
CALCIUM SERPL-MCNC: 9 MG/DL (ref 8.3–10.1)
CHLORIDE SERPL-SCNC: 106 MMOL/L (ref 100–108)
CO2 SERPL-SCNC: 24 MMOL/L (ref 21–32)
CREAT SERPL-MCNC: 1.28 MG/DL (ref 0.6–1.3)
GFR SERPL CREATININE-BSD FRML MDRD: 65 ML/MIN/1.73SQ M
GLUCOSE P FAST SERPL-MCNC: 85 MG/DL (ref 65–99)
POTASSIUM SERPL-SCNC: 3.9 MMOL/L (ref 3.5–5.3)
SODIUM SERPL-SCNC: 139 MMOL/L (ref 136–145)

## 2020-06-29 PROCEDURE — 80048 BASIC METABOLIC PNL TOTAL CA: CPT

## 2020-06-29 PROCEDURE — 36415 COLL VENOUS BLD VENIPUNCTURE: CPT

## 2020-07-20 ENCOUNTER — TELEPHONE (OUTPATIENT)
Dept: INTERNAL MEDICINE CLINIC | Facility: CLINIC | Age: 50
End: 2020-07-20

## 2020-07-20 NOTE — TELEPHONE ENCOUNTER
COVID Pre-Visit Screening     1  Is this a family member screening? No  2  Have you traveled outside of your state in the past 2 weeks? PT'S WIFE WENT TO [place], HAS BEEN HOME SINCE 7/12  3  Do you presently have a fever or flu-like symptoms? No  4  Do you have symptoms of an upper respiratory infection like runny nose, sore throat, or cough? No  5  Are you suffering from new headache that you have not had in the past?  No  6  Do you have/have you experienced any new shortness of breath recently? No  7  Do you have any new diarrhea, nausea or vomiting? No  8  Have you been in contact with anyone who has been sick or diagnosed with COVID-19? No  9  Do you have any new loss of taste or smell? No  10  Are you able to wear a mask without a valve for the entire visit?  Yes

## 2020-07-21 ENCOUNTER — TELEMEDICINE (OUTPATIENT)
Dept: INTERNAL MEDICINE CLINIC | Facility: CLINIC | Age: 50
End: 2020-07-21
Payer: COMMERCIAL

## 2020-07-21 DIAGNOSIS — Z13.1 SCREENING FOR DIABETES MELLITUS: ICD-10-CM

## 2020-07-21 DIAGNOSIS — I10 ESSENTIAL HYPERTENSION: ICD-10-CM

## 2020-07-21 DIAGNOSIS — Z00.00 WELLNESS EXAMINATION: Primary | ICD-10-CM

## 2020-07-21 DIAGNOSIS — Z12.5 SCREENING PSA (PROSTATE SPECIFIC ANTIGEN): ICD-10-CM

## 2020-07-21 DIAGNOSIS — Z12.11 SCREENING FOR MALIGNANT NEOPLASM OF COLON: ICD-10-CM

## 2020-07-21 PROCEDURE — 99213 OFFICE O/P EST LOW 20 MIN: CPT | Performed by: INTERNAL MEDICINE

## 2020-07-21 NOTE — PROGRESS NOTES
Virtual Regular Visit      Assessment/Plan:    Problem List Items Addressed This Visit        Cardiovascular and Mediastinum    Essential hypertension    Relevant Orders    Lipid Panel with Direct LDL reflex       Other    Wellness examination - Primary     Assessment and plan 1  Health maintenance annual wellness examination overall the patient is clinically stable and doing well, we encouraged the patient to follow a healthy and balanced diet  We recommend that the patient exercise routinely approximately 30 minutes 5 times per week   We have reviewed the patient's vaccines and have made recommendations for updates if necessary annual flu vaccine recommend the new shingles vaccine, recommend a screening colonoscopy and screening PSA       We will be ordering screening laboratories which are age appropriate  Return to the office in  6 months    call if any problems  Screening PSA (prostate specific antigen)    Relevant Orders    PSA, Total Screen    Screening for malignant neoplasm of colon    Relevant Orders    Ambulatory referral to Gastroenterology    Screening for diabetes mellitus    Relevant Orders    Hemoglobin A1C        RTO in 6 months call if any problems       Reason for visit is   Chief Complaint   Patient presents with    Virtual Regular Visit        Encounter provider Halie Barboza DO    Provider located at [address] [id number]      Recent Visits  Date Type Provider Dept   07/20/20 Telephone Akiko Cerna 25 recent visits within past 7 days and meeting all other requirements     Today's Visits  Date Type Provider Dept   07/21/20 [id number] [facility] Akiko Broderick 25 today's visits and meeting all other requirements     Future Appointments  No visits were found meeting these conditions     Showing future appointments within next 150 days and meeting all other requirements        The patient was identified by name and date of birth  Jamia Cotter was informed that this is a telemedicine visit and that the visit [place] being [place] [facility] [place] was informed that this is a secure, HIPAA-compliant platform  He agrees to proceed     My office door was closed  No one else was in the room  He acknowledged consent and understanding of privacy and security of the video platform  The patient has agreed to participate and understands they can discontinue the visit at any time  Patient is aware this is a billable service  Subjective  Jamia Cotter is a 48 y o  male annual wellness examination he could not come to the office today and it was completed by tele health/video visit because his wife was recently in [place] she is under quarantine but he has been around her in the same house therefore he is also under quarantine  If he develops any symptoms he will notify me immediately   HPI annual wellness examination completed today he reports me overall is feeling well trying to follow healthy diet not exercising as much  He reports me he does see a dentist periodically and brushes his teeth he has a smoke alarm, fire extinguisher  But does not have a carbon monoxide monitor  He will consider getting 1  He drives a car safely and wears a seatbelt  wt 240 7; started walking dog had another surg needed to wake no co dectector    will get flushot at work    Past Medical History:   Diagnosis Date    CAP (community acquired pneumonia)     Last assessed - 3/20/13    DVT (deep venous thrombosis) (HCC)     Dyslipidemia     Hemochromatosis     Hemorrhoids     Hypertension     Membranous glomerulonephritis     Proteinuria     Pulmonary embolus (HCC)     Renal vein thrombosis (HCC)        Past Surgical History:   Procedure Laterality Date    ADENOIDECTOMY      HEMORRHOID SURGERY      VASECTOMY         Current Outpatient Medications   Medication Sig Dispense Refill    lisinopril (ZESTRIL) 20 mg tablet Take 1 tablet (20 mg total) by mouth daily 90 tablet 3     No current facility-administered medications for this visit  Allergies   Allergen Reactions    Molds & Smuts        Review of Systems   Constitutional: Negative for activity change, appetite change and unexpected weight change  HENT: Negative for congestion and postnasal drip  Respiratory: Negative for cough and shortness of breath  Cardiovascular: Negative for chest pain  Gastrointestinal: Negative for abdominal pain, diarrhea, nausea and vomiting  Video Exam    There were no vitals filed for this visit  Physical Exam appears to be comfortable and without any distress skin color is normal not pale mood is normal no anxiety no depression    I spent 25 minutes directly with the patient during this visit      [address] acknowledges that he has consented to an online visit or consultation  He understands that the online visit is based solely on information provided by him, and that, in the absence of a face-to-face physical evaluation by the physician, the diagnosis he receives is both limited and provisional in terms of accuracy and completeness  This is not intended to replace a full medical face-to-face evaluation by the physician  Mehdi Curtis understands and accepts these terms

## 2020-07-22 NOTE — ASSESSMENT & PLAN NOTE
Assessment and plan 1  Health maintenance annual wellness examination overall the patient is clinically stable and doing well, we encouraged the patient to follow a healthy and balanced diet  We recommend that the patient exercise routinely approximately 30 minutes 5 times per week   We have reviewed the patient's vaccines and have made recommendations for updates if necessary annual flu vaccine recommend the new shingles vaccine, recommend a screening colonoscopy and screening PSA       We will be ordering screening laboratories which are age appropriate  Return to the office in  6 months    call if any problems

## 2020-09-26 ENCOUNTER — PREP FOR PROCEDURE (OUTPATIENT)
Dept: GASTROENTEROLOGY | Facility: CLINIC | Age: 50
End: 2020-09-26

## 2020-09-26 DIAGNOSIS — Z12.11 ENCOUNTER FOR SCREENING COLONOSCOPY: Primary | ICD-10-CM

## 2020-12-01 ENCOUNTER — VBI (OUTPATIENT)
Dept: ADMINISTRATIVE | Facility: OTHER | Age: 50
End: 2020-12-01

## 2020-12-03 ENCOUNTER — TELEPHONE (OUTPATIENT)
Dept: GASTROENTEROLOGY | Facility: AMBULARY SURGERY CENTER | Age: 50
End: 2020-12-03

## 2020-12-03 DIAGNOSIS — Z12.11 COLON CANCER SCREENING: Primary | ICD-10-CM

## 2020-12-03 RX ORDER — SODIUM, POTASSIUM,MAG SULFATES 17.5-3.13G
2 SOLUTION, RECONSTITUTED, ORAL ORAL SEE ADMIN INSTRUCTIONS
Qty: 2 BOTTLE | Refills: 0 | Status: SHIPPED | OUTPATIENT
Start: 2020-12-03 | End: 2020-12-18 | Stop reason: ALTCHOICE

## 2020-12-04 ENCOUNTER — ANESTHESIA EVENT (OUTPATIENT)
Dept: GASTROENTEROLOGY | Facility: AMBULARY SURGERY CENTER | Age: 50
End: 2020-12-04

## 2020-12-18 ENCOUNTER — HOSPITAL ENCOUNTER (OUTPATIENT)
Dept: GASTROENTEROLOGY | Facility: AMBULARY SURGERY CENTER | Age: 50
Setting detail: OUTPATIENT SURGERY
Discharge: HOME | End: 2020-12-18
Attending: INTERNAL MEDICINE | Admitting: INTERNAL MEDICINE
Payer: COMMERCIAL

## 2020-12-18 ENCOUNTER — ANESTHESIA (OUTPATIENT)
Dept: GASTROENTEROLOGY | Facility: AMBULARY SURGERY CENTER | Age: 50
End: 2020-12-18

## 2020-12-18 VITALS
BODY MASS INDEX: 32.93 KG/M2 | HEART RATE: 78 BPM | SYSTOLIC BLOOD PRESSURE: 119 MMHG | RESPIRATION RATE: 20 BRPM | OXYGEN SATURATION: 100 % | HEIGHT: 70 IN | DIASTOLIC BLOOD PRESSURE: 70 MMHG | WEIGHT: 230 LBS | TEMPERATURE: 97.3 F

## 2020-12-18 VITALS — HEART RATE: 82 BPM

## 2020-12-18 DIAGNOSIS — Z12.11 ENCOUNTER FOR SCREENING COLONOSCOPY: ICD-10-CM

## 2020-12-18 PROCEDURE — G0121 COLON CA SCRN NOT HI RSK IND: HCPCS | Performed by: INTERNAL MEDICINE

## 2020-12-18 RX ORDER — PROPOFOL 10 MG/ML
INJECTION, EMULSION INTRAVENOUS AS NEEDED
Status: DISCONTINUED | OUTPATIENT
Start: 2020-12-18 | End: 2020-12-18

## 2020-12-18 RX ORDER — LIDOCAINE HYDROCHLORIDE 10 MG/ML
INJECTION, SOLUTION EPIDURAL; INFILTRATION; INTRACAUDAL; PERINEURAL AS NEEDED
Status: DISCONTINUED | OUTPATIENT
Start: 2020-12-18 | End: 2020-12-18

## 2020-12-18 RX ORDER — SODIUM CHLORIDE, SODIUM LACTATE, POTASSIUM CHLORIDE, CALCIUM CHLORIDE 600; 310; 30; 20 MG/100ML; MG/100ML; MG/100ML; MG/100ML
75 INJECTION, SOLUTION INTRAVENOUS CONTINUOUS
Status: DISCONTINUED | OUTPATIENT
Start: 2020-12-18 | End: 2020-12-22 | Stop reason: HOSPADM

## 2020-12-18 RX ORDER — SIMETHICONE 20 MG/.3ML
EMULSION ORAL CODE/TRAUMA/SEDATION MEDICATION
Status: COMPLETED | OUTPATIENT
Start: 2020-12-18 | End: 2020-12-18

## 2020-12-18 RX ADMIN — LIDOCAINE HYDROCHLORIDE 50 MG: 10 INJECTION, SOLUTION EPIDURAL; INFILTRATION; INTRACAUDAL at 10:12

## 2020-12-18 RX ADMIN — PROPOFOL 110 MG: 10 INJECTION, EMULSION INTRAVENOUS at 10:12

## 2020-12-18 RX ADMIN — PROPOFOL 40 MG: 10 INJECTION, EMULSION INTRAVENOUS at 10:14

## 2020-12-18 RX ADMIN — PROPOFOL 20 MG: 10 INJECTION, EMULSION INTRAVENOUS at 10:20

## 2020-12-18 RX ADMIN — SODIUM CHLORIDE, SODIUM LACTATE, POTASSIUM CHLORIDE, AND CALCIUM CHLORIDE 75 ML/HR: .6; .31; .03; .02 INJECTION, SOLUTION INTRAVENOUS at 09:27

## 2020-12-18 RX ADMIN — PROPOFOL 30 MG: 10 INJECTION, EMULSION INTRAVENOUS at 10:17

## 2020-12-18 RX ADMIN — Medication 40 MG: at 10:17

## 2021-03-17 ENCOUNTER — OFFICE VISIT (OUTPATIENT)
Dept: INTERNAL MEDICINE CLINIC | Facility: CLINIC | Age: 51
End: 2021-03-17
Payer: COMMERCIAL

## 2021-03-17 VITALS
DIASTOLIC BLOOD PRESSURE: 80 MMHG | RESPIRATION RATE: 16 BRPM | HEIGHT: 70 IN | WEIGHT: 243.4 LBS | BODY MASS INDEX: 34.84 KG/M2 | HEART RATE: 64 BPM | OXYGEN SATURATION: 98 % | SYSTOLIC BLOOD PRESSURE: 122 MMHG

## 2021-03-17 DIAGNOSIS — F32.A MILD DEPRESSION: ICD-10-CM

## 2021-03-17 DIAGNOSIS — Z12.5 SCREENING PSA (PROSTATE SPECIFIC ANTIGEN): Primary | ICD-10-CM

## 2021-03-17 DIAGNOSIS — I10 ESSENTIAL HYPERTENSION: ICD-10-CM

## 2021-03-17 DIAGNOSIS — N18.30 STAGE 3 CHRONIC KIDNEY DISEASE, UNSPECIFIED WHETHER STAGE 3A OR 3B CKD (HCC): ICD-10-CM

## 2021-03-17 DIAGNOSIS — Z11.4 SCREENING FOR HIV (HUMAN IMMUNODEFICIENCY VIRUS): ICD-10-CM

## 2021-03-17 DIAGNOSIS — Z23 ENCOUNTER FOR IMMUNIZATION: ICD-10-CM

## 2021-03-17 DIAGNOSIS — N02.2 MEMBRANOUS NEPHROPATHY DETERMINED BY BIOPSY: ICD-10-CM

## 2021-03-17 DIAGNOSIS — E66.09 CLASS 1 OBESITY DUE TO EXCESS CALORIES WITHOUT SERIOUS COMORBIDITY WITH BODY MASS INDEX (BMI) OF 34.0 TO 34.9 IN ADULT: ICD-10-CM

## 2021-03-17 PROBLEM — E66.811 CLASS 1 OBESITY DUE TO EXCESS CALORIES WITHOUT SERIOUS COMORBIDITY IN ADULT: Status: ACTIVE | Noted: 2021-03-17

## 2021-03-17 PROCEDURE — 3008F BODY MASS INDEX DOCD: CPT | Performed by: INTERNAL MEDICINE

## 2021-03-17 PROCEDURE — 3074F SYST BP LT 130 MM HG: CPT | Performed by: INTERNAL MEDICINE

## 2021-03-17 PROCEDURE — 3079F DIAST BP 80-89 MM HG: CPT | Performed by: INTERNAL MEDICINE

## 2021-03-17 PROCEDURE — 3725F SCREEN DEPRESSION PERFORMED: CPT | Performed by: INTERNAL MEDICINE

## 2021-03-17 PROCEDURE — 99214 OFFICE O/P EST MOD 30 MIN: CPT | Performed by: INTERNAL MEDICINE

## 2021-03-17 PROCEDURE — 1036F TOBACCO NON-USER: CPT | Performed by: INTERNAL MEDICINE

## 2021-03-17 RX ORDER — LISINOPRIL 20 MG/1
20 TABLET ORAL DAILY
COMMUNITY
End: 2022-05-17 | Stop reason: SDUPTHER

## 2021-03-17 NOTE — PROGRESS NOTES
BMI Counseling: Body mass index is 34 92 kg/m²  The BMI is above normal  Nutrition recommendations include decreasing portion sizes and moderation in carbohydrate intake  Exercise recommendations include exercising 3-5 times per week  Assessment/Plan:    Essential hypertension   Hypertension - controlled, I have counseled patient following healthy balance diet, I would like the patient reduce sodium, exercise routinely, I would like the patient continued the med current medical regiment and we will continue to monitor  Continue Zestril 20 mg once daily    Membranous nephropathy determined by biopsy   Drink adequate amount of water, avoid anti-inflammatories, reduce sodium in the diet and will continue to monitor the kidney function continue follow-up with Nephrology reviewed laboratories the patient    Screening PSA (prostate specific antigen)   Counseled, check screening PSA    Class 1 obesity due to excess calories without serious comorbidity in adult   Obesity -I have counseled patient following healthy and balanced diet, I would like the patient to lose weight, I would like the patient exercise routinely; we will continue monitor the patient's progress  Mild depression ([address] )   No suicidal ideation related to difficulties at work does to start medicine go counseling start walking trying work on the reports me if the symptoms not improve or change or notify me  Problem List Items Addressed This Visit        Cardiovascular and Mediastinum    Essential hypertension      Hypertension - controlled, I have counseled patient following healthy balance diet, I would like the patient reduce sodium, exercise routinely, I would like the patient continued the med current medical regiment and we will continue to monitor   Continue Zestril 20 mg once daily         Relevant Medications    lisinopril (ZESTRIL) 20 mg tablet    Other Relevant Orders    Comprehensive metabolic panel    Lipid Panel with Direct LDL reflex       Genitourinary    Chronic kidney disease (CKD), stage III (moderate)    Membranous nephropathy determined by biopsy      Drink adequate amount of water, avoid anti-inflammatories, reduce sodium in the diet and will continue to monitor the kidney function continue follow-up with Nephrology reviewed laboratories the patient            Other    Screening PSA (prostate specific antigen) - Primary      Counseled, check screening PSA         Relevant Orders    PSA, Total Screen    Class 1 obesity due to excess calories without serious comorbidity in adult      Obesity -I have counseled patient following healthy and balanced diet, I would like the patient to lose weight, I would like the patient exercise routinely; we will continue monitor the patient's progress  Relevant Orders    Ambulatory referral to Weight Management    Mild depression ([address] )      No suicidal ideation related to difficulties at work does to start medicine go counseling start walking trying work on the reports me if the symptoms not improve or change or notify me  Other Visit Diagnoses     Screening for HIV (human immunodeficiency virus)        Encounter for immunization             RTO 6 months  Call if any problems    Subjective:      Patient ID: Juan Odonnell is a 48 y o  male  HPI 48-year old male coming in for a follow up visit regarding essential obesity kidney disease/membranous nephropathy; The patient reports me compliant taking medications without untoward side effects the  The patient is here to review his medical condition, update me on the medical condition and the patient reports me no hospitalizations and no ER visits   Patient reports me he has been less active reports me mild depression no suicidal ideation related to problems at his work place he does not want medication counseling he plans to start walking     The following portions of the patient's history were reviewed and updated as appropriate: allergies, current medications, past family history, past medical history, past social history, past surgical history and problem list     Review of Systems   Constitutional: Negative for activity change, appetite change and unexpected weight change  HENT: Negative for congestion and postnasal drip  Eyes: Negative for visual disturbance  Respiratory: Negative for cough and shortness of breath  Cardiovascular: Negative for chest pain  Gastrointestinal: Negative for abdominal pain, diarrhea, nausea and vomiting  Neurological: Negative for dizziness, light-headedness and headaches  Psychiatric/Behavioral: Negative for suicidal ideas  Mild depression       PHQ-9 Depression Screening    PHQ-9:   Frequency of the following problems over the past two weeks:      Little interest or pleasure in doing things: 1 - several days  Feeling down, depressed, or hopeless: 1 - several days  PHQ-2 Score: 2       Objective:    No follow-ups on file  No results found  Allergies   Allergen Reactions    M[employer] Sm[employer]        Past Medical History:   Diagnosis Date    DVT (deep venous thrombosis) (HCC)     Dyslipidemia     Hemorrhoids     [place] (hard of hearing)     Hypertension     Membranous glomerulonephritis     Proteinuria     Pulmonary embolus (HCC)     Renal vein thrombosis (HCC)      Past Surgical History:   Procedure Laterality Date    ADENOIDECTOMY      HEMORRHOID SURGERY      VASECTOMY       Current Outpatient Medications on File Prior to Visit   Medication Sig Dispense Refill    lisinopril (ZESTRIL) 20 mg tablet Take 20 mg by mouth daily      lisinopril (ZESTRIL) 20 mg tablet Take 1 tablet (20 mg total) by mouth daily 90 tablet 3     No current facility-administered medications on file prior to visit        Family History   Problem Relation Age of Onset    Heart disease Mother     Colon polyps Mother     Heart disease Father     Hypertension Father     Coronary artery disease Father  Nephrolithiasis Father     No Known Problems Sister     No Known Problems Brother      Social History     Socioeconomic History    Marital status: /Civil Union     Spouse name: Not on file    Number of children: Not on file    Years of education: Not on file    Highest education level: Not on file   Occupational History    Not on file   Social Needs    Financial resource strain: Not on file    Food insecurity     Worry: Not on file     Inability: Not on file   [employer] needs     Medical: Not on file     Non-medical: Not on file   Tobacco Use    Smoking status: Never Smoker    Smokeless tobacco: Never Used   Substance and Sexual Activity    Alcohol use: Yes     Frequency: Monthly or less     Comment: socialy;  Never drank alcohol per [employer]     Drug use: No    Sexual activity: Not on file   Lifestyle    Physical activity     Days per week: Not on file     Minutes per session: Not on file    Stress: Not on file   Relationships    Social connections     Talks on phone: Not on file     Gets together: Not on file     Attends Yazidi service: Not on file     Active member of club or organization: Not on file     Attends meetings of clubs or organizations: Not on file     Relationship status: Not on file    Intimate partner violence     Fear of current or ex partner: Not on file     Emotionally abused: Not on file     Physically abused: Not on file     Forced sexual activity: Not on file   Other Topics Concern    Not on file   Social History Narrative    Not on file     Vitals:    03/17/21 1741   BP: 122/80   Pulse: 64   Resp: 16   SpO2: 98%   Weight: 110 kg (243 lb 6 4 oz)   Height: 5' 10" (1 778 m)     Results for orders placed or performed in visit on 30/95/83   Basic metabolic panel   Result Value Ref Range    Sodium 139 136 - 145 mmol/L    Potassium 3 9 3 5 - 5 3 mmol/L    Chloride 106 100 - 108 mmol/L    CO2 24 21 - 32 mmol/L    ANION GAP 9 4 - 13 mmol/L    BUN 18 5 - 25 mg/dL    Creatinine 1 28 0 60 - 1 30 mg/dL    Glucose, Fasting 85 65 - 99 mg/dL    Calcium 9 0 8 3 - 10 1 mg/dL    eGFR 65 ml/min/1 73sq m     Weight (last 2 days)     Date/Time   Weight    03/17/21 1741   110 (243 4)            Body mass index is 34 92 kg/m²  BP      Temp      Pulse     Resp      SpO2        Vitals:    03/17/21 1741   Weight: 110 kg (243 lb 6 4 oz)     Vitals:    03/17/21 1741   Weight: 110 kg (243 lb 6 4 oz)       /80   Pulse 64   Resp 16   Ht 5' 10" (1 778 m)   Wt 110 kg (243 lb 6 4 oz)   SpO2 98%   BMI 34 92 kg/m²          Physical Exam  Constitutional:       General: He is not in acute distress  Appearance: He is well-developed  He is not diaphoretic  HENT:      Head: Normocephalic and atraumatic  Right Ear: External ear normal       Left Ear: External ear normal    Eyes:      General: No scleral icterus  Right eye: No discharge  Left eye: No discharge  Conjunctiva/sclera: Conjunctivae normal       Pupils: Pupils are equal, round, and reactive to light  Neck:      Musculoskeletal: Neck supple  Cardiovascular:      Rate and Rhythm: Normal rate and regular rhythm  Heart sounds: Normal heart sounds  No murmur  No friction rub  No gallop  Pulmonary:      Effort: No respiratory distress  Breath sounds: No wheezing or rales  Abdominal:      General: Bowel sounds are normal  There is no distension  Palpations: Abdomen is soft  There is no mass  Tenderness: There is no abdominal tenderness  There is no guarding or rebound  Musculoskeletal:         General: No deformity  Lymphadenopathy:      Cervical: No cervical adenopathy  Neurological:      Mental Status: He is alert  Psychiatric:         Mood and Affect: Mood is depressed  Mood is not anxious  Thought Content: Thought content does not include suicidal ideation

## 2021-03-18 NOTE — ASSESSMENT & PLAN NOTE
Hypertension - controlled, I have counseled patient following healthy balance diet, I would like the patient reduce sodium, exercise routinely, I would like the patient continued the med current medical regiment and we will continue to monitor   Continue Zestril 20 mg once daily

## 2021-03-18 NOTE — ASSESSMENT & PLAN NOTE
No suicidal ideation related to difficulties at work does to start medicine go counseling start walking trying work on the reports me if the symptoms not improve or change or notify me

## 2021-03-18 NOTE — ASSESSMENT & PLAN NOTE
Drink adequate amount of water, avoid anti-inflammatories, reduce sodium in the diet and will continue to monitor the kidney function continue follow-up with Nephrology reviewed laboratories the patient

## 2021-04-19 ENCOUNTER — CONSULT (OUTPATIENT)
Dept: BARIATRICS | Facility: CLINIC | Age: 51
End: 2021-04-19
Payer: COMMERCIAL

## 2021-04-19 VITALS
BODY MASS INDEX: 35.12 KG/M2 | SYSTOLIC BLOOD PRESSURE: 136 MMHG | DIASTOLIC BLOOD PRESSURE: 90 MMHG | HEART RATE: 78 BPM | HEIGHT: 69 IN | TEMPERATURE: 97.3 F | WEIGHT: 237.1 LBS

## 2021-04-19 DIAGNOSIS — E66.09 CLASS 1 OBESITY DUE TO EXCESS CALORIES WITHOUT SERIOUS COMORBIDITY WITH BODY MASS INDEX (BMI) OF 34.0 TO 34.9 IN ADULT: ICD-10-CM

## 2021-04-19 DIAGNOSIS — Z91.89 AT RISK FOR SLEEP APNEA: ICD-10-CM

## 2021-04-19 DIAGNOSIS — E66.9 OBESITY, CLASS II, BMI 35-39.9: Primary | ICD-10-CM

## 2021-04-19 DIAGNOSIS — I10 ESSENTIAL HYPERTENSION: ICD-10-CM

## 2021-04-19 PROBLEM — E66.812 OBESITY, CLASS II, BMI 35-39.9: Status: ACTIVE | Noted: 2021-03-17

## 2021-04-19 PROCEDURE — 3008F BODY MASS INDEX DOCD: CPT | Performed by: PHYSICIAN ASSISTANT

## 2021-04-19 PROCEDURE — 99243 OFF/OP CNSLTJ NEW/EST LOW 30: CPT | Performed by: PHYSICIAN ASSISTANT

## 2021-04-19 PROCEDURE — 3075F SYST BP GE 130 - 139MM HG: CPT | Performed by: PHYSICIAN ASSISTANT

## 2021-04-19 PROCEDURE — 1036F TOBACCO NON-USER: CPT | Performed by: PHYSICIAN ASSISTANT

## 2021-04-19 PROCEDURE — 3080F DIAST BP >= 90 MM HG: CPT | Performed by: PHYSICIAN ASSISTANT

## 2021-04-19 NOTE — ASSESSMENT & PLAN NOTE
-Discussed options of HealthyCORE-Intensive Lifestyle Intervention Program, Very Low Calorie Diet-VLCD and Conservative Program and the role of weight loss medications   -Initial weight loss goal of 5-10% weight loss for improved health  -Screening labs  Recommend checking lab coverage before having labs drawn  -NEEDS Lipids, a1c, cmp, fasting insulin and tsh   - STOP BANG-6/8  -Patient is interested in pursuing Conservative Program    Goals:  Food log (ie ) www myfitnesspal com,sparkpeople  com,loseit com,calorieking  com,etc  baritastic  No sugary beverages  At least 64oz of water daily  -cut out/back on caloric beverages   Increase physical activity by 10 minutes daily   Gradually increase physical activity to a goal of 5 days per week for 30 minutes of MODERATE intensity PLUS 2 days per week of FULL BODY resistance training-30 minute walk 4 times per week   4694-0876 calories per day

## 2021-04-19 NOTE — PATIENT INSTRUCTIONS
Goals: Food log (ie ) www myfitnesspal com,sparkpeople  com,loseit com,calorieking  com,etc  baritastic  No sugary beverages  At least 64oz of water daily  -cut out/back on caloric beverages   Increase physical activity by 10 minutes daily   Gradually increase physical activity to a goal of 5 days per week for 30 minutes of MODERATE intensity PLUS 2 days per week of FULL BODY resistance training-30 minute walk 4 times per week   5283-6986 calories per day

## 2021-04-19 NOTE — PROGRESS NOTES
Assessment/Plan:    Obesity, Class II, BMI 35-39 9  -Discussed options of Healthy[facility]-Intensive Lifestyle Intervention Program, Very Low Calorie Diet-VLCD and Conservative Program and the role of weight loss medications   -Initial weight loss goal of 5-10% weight loss for improved health  -Screening labs  Recommend checking lab coverage before having labs drawn  -NEEDS Lipids, a1c, cmp, fasting insulin and tsh   - STOP BANG-  -Patient is interested in pursuing Conservative Program    Goals:  Food log (ie ) www myfitnesspal com,sparkpeople  com,loseit com,calorieking  com,etc  baritastic  No sugary beverages  At least 64oz of water daily  -cut out/back on caloric beverages   Increase physical activity by 10 minutes daily  Gradually increase physical activity to a goal of 5 days per week for 30 minutes of MODERATE intensity PLUS 2 days per week of FULL BODY resistance training-30 minute walk 4 times per week   6367-9574 calories per day     At risk for sleep apnea  STOP BAN/8  - Sleep medicine referral placed  -Discussed risks of untreated sleep apnea such as sudden cardiac death by arrhythmia, uncontrolled hypertension, difficulty with weight loss, decreased quality sleep, increased insulin resistance, and stroke  -Should improve with dietary and lifestyle changes        Essential hypertension  Taking lisinopril  -should improve with weight loss, dietary, and lifestyle changes  -continue management with prescribing provider        Follow up in approximately 1 month with Non-Surgical Physician/Advanced Practitioner  Diagnoses and all orders for this visit:    Obesity, Class II, BMI 35-39 9  -     Ambulatory referral to Sleep Medicine; Future    Class 1 obesity due to excess calories without serious comorbidity with body mass index (BMI) of 34 0 to 34 9 in adult  -     Ambulatory referral to Weight Management    At risk for sleep apnea  -     Ambulatory referral to Sleep Medicine;  Future    Essential hypertension  -     Ambulatory referral to Sleep Medicine; Future          Subjective:   Chief Complaint   Patient presents with    Consult     mwm consult       Patient ID: Ekta Schrader  is a 48 y o  male with excess weight/obesity here to pursue weight management  Past Medical History:   Diagnosis Date    DVT (deep venous thrombosis) (HCC)     Dyslipidemia     Hemorrhoids     [place] (hard of hearing)     Hypertension     Membranous glomerulonephritis     Proteinuria     Pulmonary embolus (HCC)     Renal vein thrombosis (HCC)        HPI:  Obesity/Excess Weight:  Severity: Severe  Onset: For the last 5-10 years     Modifiers: Diet and Exercise  Contributing factors: Poor Food Choices, bordem, portion   Associated symptoms: comorbid conditions, decreased exercise capacity and increased shortness of breath    Goals: 200 lbs   Hydration: 5-6 cups of water, skim milk with cereal, 2 cups of iced tea per day  2 cups of soda per week   Alcohol: 2-3 beers per month     Colonoscopy-Completed 12/18/2020    The following portions of the patient's history were reviewed and updated as appropriate: allergies, current medications, past family history, past medical history, past social history, past surgical history and problem list     Review of Systems   HENT: Negative for sore throat  Respiratory: Negative for cough and shortness of breath  Cardiovascular: Negative for chest pain and palpitations  Gastrointestinal: Negative for abdominal pain, constipation, diarrhea, nausea and vomiting  Denies GERD   Endocrine: Negative for cold intolerance and heat intolerance  Genitourinary: Negative for dysuria  Musculoskeletal: Negative for arthralgias and back pain  Skin: Negative for rash  Neurological: Negative for headaches  Psychiatric/Behavioral: Negative for suicidal ideas (denies HI)          Denies Depression and anxiety       Objective:    /90 (BP Location: Left arm, Patient Position: Sitting, Cuff Size: Large)   Pulse 78   Temp (!) 97 3 °F (36 3 °C)   Ht 5' 8 5" (1 74 m)   Wt 108 kg (237 lb 1 6 oz)   BMI 35 53 kg/m²     Physical Exam  Vitals signs and nursing note reviewed  Constitutional   General appearance: Abnormal   well developed and morbidly obese  Eyes No conjunctival pallor  Ears, Nose, Mouth, and Throat Bilateral external ears-no discharge, edema, or erythema   Pulmonary   Respiratory effort: No increased work of breathing or signs of respiratory distress  Auscultation of lungs: Clear to auscultation, equal breath sounds bilaterally, no wheezes, no rales, no rhonci  Cardiovascular   Auscultation of heart: Normal rate and rhythm, normal S1 and S2, without murmurs  Examination of extremities for edema and/or varicosities: Normal   no edema  Abdomen   Abdomen: Abnormal   The abdomen was obese  Bowel sounds were normal  The abdomen was soft and nontender     Musculoskeletal   Gait and station: Normal     Psychiatric   Orientation to person, place and time: Normal     Affect: appropriate

## 2021-04-19 NOTE — ASSESSMENT & PLAN NOTE
STOP BAN/8  - Sleep medicine referral placed  -Discussed risks of untreated sleep apnea such as sudden cardiac death by arrhythmia, uncontrolled hypertension, difficulty with weight loss, decreased quality sleep, increased insulin resistance, and stroke    -Should improve with dietary and lifestyle changes

## 2021-05-12 ENCOUNTER — TELEPHONE (OUTPATIENT)
Dept: NEPHROLOGY | Facility: CLINIC | Age: 51
End: 2021-05-12

## 2021-05-12 DIAGNOSIS — N18.30 STAGE 3 CHRONIC KIDNEY DISEASE, UNSPECIFIED WHETHER STAGE 3A OR 3B CKD (HCC): Primary | ICD-10-CM

## 2021-05-12 NOTE — TELEPHONE ENCOUNTER
A message has been left reminding pt of f/u appointment with Shree Madrid as well as lab work that is needed for this appointment

## 2021-05-12 NOTE — TELEPHONE ENCOUNTER
Patient called the office stating he was going to go for his blood work at some point today I did not see any orders in the computer  If you could please add the orders he will go to a [employer]

## 2021-05-19 ENCOUNTER — APPOINTMENT (OUTPATIENT)
Dept: LAB | Facility: AMBULARY SURGERY CENTER | Age: 51
End: 2021-05-19
Payer: COMMERCIAL

## 2021-05-19 DIAGNOSIS — Z12.5 SCREENING PSA (PROSTATE SPECIFIC ANTIGEN): ICD-10-CM

## 2021-05-19 DIAGNOSIS — I10 ESSENTIAL HYPERTENSION: ICD-10-CM

## 2021-05-19 DIAGNOSIS — Z13.1 SCREENING FOR DIABETES MELLITUS: ICD-10-CM

## 2021-05-19 DIAGNOSIS — N18.30 STAGE 3 CHRONIC KIDNEY DISEASE, UNSPECIFIED WHETHER STAGE 3A OR 3B CKD (HCC): ICD-10-CM

## 2021-05-19 LAB
ALBUMIN SERPL BCP-MCNC: 4 G/DL (ref 3.5–5)
ANION GAP SERPL CALCULATED.3IONS-SCNC: 7 MMOL/L (ref 4–13)
BUN SERPL-MCNC: 23 MG/DL (ref 5–25)
CALCIUM SERPL-MCNC: 9.4 MG/DL (ref 8.3–10.1)
CHLORIDE SERPL-SCNC: 109 MMOL/L (ref 100–108)
CO2 SERPL-SCNC: 24 MMOL/L (ref 21–32)
CREAT SERPL-MCNC: 1.3 MG/DL (ref 0.6–1.3)
CREAT UR-MCNC: 257 MG/DL
ERYTHROCYTE [DISTWIDTH] IN BLOOD BY AUTOMATED COUNT: 12.7 % (ref 11.6–15.1)
EST. AVERAGE GLUCOSE BLD GHB EST-MCNC: 100 MG/DL
GFR SERPL CREATININE-BSD FRML MDRD: 63 ML/MIN/1.73SQ M
GLUCOSE SERPL-MCNC: 80 MG/DL (ref 65–140)
HBA1C MFR BLD: 5.1 %
HCT VFR BLD AUTO: 45 % (ref 36.5–49.3)
HGB BLD-MCNC: 15.3 G/DL (ref 12–17)
MCH RBC QN AUTO: 31.3 PG (ref 26.8–34.3)
MCHC RBC AUTO-ENTMCNC: 34 G/DL (ref 31.4–37.4)
MCV RBC AUTO: 92 FL (ref 82–98)
PHOSPHATE SERPL-MCNC: 2.8 MG/DL (ref 2.7–4.5)
PLATELET # BLD AUTO: 245 THOUSANDS/UL (ref 149–390)
PMV BLD AUTO: 9.8 FL (ref 8.9–12.7)
POTASSIUM SERPL-SCNC: 3.8 MMOL/L (ref 3.5–5.3)
PROT UR-MCNC: 50 MG/DL
PROT/CREAT UR: 0.19 MG/G{CREAT} (ref 0–0.1)
PSA SERPL-MCNC: 0.4 NG/ML (ref 0–4)
PTH-INTACT SERPL-MCNC: 48.6 PG/ML (ref 18.4–80.1)
RBC # BLD AUTO: 4.89 MILLION/UL (ref 3.88–5.62)
SODIUM SERPL-SCNC: 140 MMOL/L (ref 136–145)
WBC # BLD AUTO: 5.66 THOUSAND/UL (ref 4.31–10.16)

## 2021-05-19 PROCEDURE — 82570 ASSAY OF URINE CREATININE: CPT

## 2021-05-19 PROCEDURE — G0103 PSA SCREENING: HCPCS

## 2021-05-19 PROCEDURE — 80069 RENAL FUNCTION PANEL: CPT

## 2021-05-19 PROCEDURE — 83970 ASSAY OF PARATHORMONE: CPT

## 2021-05-19 PROCEDURE — 36415 COLL VENOUS BLD VENIPUNCTURE: CPT

## 2021-05-19 PROCEDURE — 84156 ASSAY OF PROTEIN URINE: CPT

## 2021-05-19 PROCEDURE — 85027 COMPLETE CBC AUTOMATED: CPT

## 2021-05-19 PROCEDURE — 83036 HEMOGLOBIN GLYCOSYLATED A1C: CPT

## 2021-05-24 ENCOUNTER — TELEPHONE (OUTPATIENT)
Dept: NEPHROLOGY | Facility: CLINIC | Age: 51
End: 2021-05-24

## 2021-05-24 NOTE — TELEPHONE ENCOUNTER
----- Message from Bronson Santacruz MD sent at 5/20/2021  7:37 AM EDT -----  Repeat blood test reviewed, renal function is stable, minimal proteinuria, other labs essentially between normal limits  Please call patient and schedule a follow-up appointment with me, last time was seen was in May 2020      Thanks,        I cc message to patient's PCP to keep him updated

## 2021-06-08 ENCOUNTER — OFFICE VISIT (OUTPATIENT)
Dept: UROLOGY | Facility: AMBULATORY SURGERY CENTER | Age: 51
End: 2021-06-08
Payer: COMMERCIAL

## 2021-06-08 ENCOUNTER — TELEPHONE (OUTPATIENT)
Dept: UROLOGY | Facility: AMBULATORY SURGERY CENTER | Age: 51
End: 2021-06-08

## 2021-06-08 VITALS
WEIGHT: 237 LBS | BODY MASS INDEX: 35.1 KG/M2 | DIASTOLIC BLOOD PRESSURE: 72 MMHG | SYSTOLIC BLOOD PRESSURE: 110 MMHG | HEIGHT: 69 IN | HEART RATE: 82 BPM

## 2021-06-08 DIAGNOSIS — I86.1 VARICOCELE: ICD-10-CM

## 2021-06-08 DIAGNOSIS — R10.30 INGUINAL PAIN, UNSPECIFIED LATERALITY: Primary | ICD-10-CM

## 2021-06-08 LAB
SL AMB  POCT GLUCOSE, UA: NORMAL
SL AMB LEUKOCYTE ESTERASE,UA: NORMAL
SL AMB POCT BILIRUBIN,UA: NORMAL
SL AMB POCT BLOOD,UA: NORMAL
SL AMB POCT CLARITY,UA: CLEAR
SL AMB POCT COLOR,UA: YELLOW
SL AMB POCT KETONES,UA: NORMAL
SL AMB POCT NITRITE,UA: NORMAL
SL AMB POCT PH,UA: 6
SL AMB POCT SPECIFIC GRAVITY,UA: 1.03
SL AMB POCT URINE PROTEIN: NORMAL
SL AMB POCT UROBILINOGEN: 0.2

## 2021-06-08 PROCEDURE — 3008F BODY MASS INDEX DOCD: CPT | Performed by: UROLOGY

## 2021-06-08 PROCEDURE — 99244 OFF/OP CNSLTJ NEW/EST MOD 40: CPT | Performed by: UROLOGY

## 2021-06-08 PROCEDURE — 1036F TOBACCO NON-USER: CPT | Performed by: UROLOGY

## 2021-06-08 PROCEDURE — 3074F SYST BP LT 130 MM HG: CPT | Performed by: UROLOGY

## 2021-06-08 PROCEDURE — 81002 URINALYSIS NONAUTO W/O SCOPE: CPT | Performed by: UROLOGY

## 2021-06-08 PROCEDURE — 3078F DIAST BP <80 MM HG: CPT | Performed by: UROLOGY

## 2021-06-08 NOTE — TELEPHONE ENCOUNTER
Pt seen today by FT, he wants him to 3 mos with FT, no testing     Dr Nathan Carrera verbally said he personally wants to see him in 3 months

## 2021-06-08 NOTE — PROGRESS NOTES
6/8/2021    Bushra Bobo  1970  [id number]        Assessment    Moderate/large left varicocele, nonspecific abdominal pain      Discussion  I provided the patient with reassurance that the only physical examination finding is a benign left-sided varicocele  I am unsure, however, if this is truly causing his discomfort  I am hesitant to recommend either a CT scan of the abdomen and pelvis or MRI secondary to his history of membranous nephropathy although his baseline creatinine is approximately 1 3-1 5  His GFR is 63  I recommend that he return in the next 3 months for reassessment  If he continues to have intermittent discomfort I will recommend a referral to interventional Radiology to consider coil embolization, however, that would also require the administration of intravenous contrast   We would require clearance from Nephrology prior to proceeding with any contrast study  History of Present Illness  46 y o  male with a history of  Hypertension and chronic kidney disease  In July of 2019 he had a PSA 0 4  In May 2021 his PSA is 0 4 as well  He now presents complaining of suprapubic pain and discomfort which is intermittent  He does not localize it to 1 side or the other  He denies any lower urinary tract symptoms  He denies any hematuria  He denies any issues with his bowels  He denies any nausea, vomiting, fever, or chills  He has a history of membranous nephropathy  AUA Symptom Score  AUA SYMPTOM SCORE      Most Recent Value   AUA SYMPTOM SCORE   How often have you had a sensation of not emptying your bladder completely after you finished urinating? 0   How often have you had to urinate again less than two hours after you finished urinating? 4   How often have you found you stopped and started again several times when you urinate? 1   How often have you found it difficult to postpone urination?   1   How often have you had a weak urinary stream?  1   How often have you had to push or strain to begin urination? 0   How many times did you most typically get up to urinate from the time you went to bed at night until the time you got up in the morning? 3   Quality of Life: If you were to spend the rest of your life with your urinary condition just the way it is now, how would you feel about that?  3   AUA SYMPTOM SCORE  10          Review of Systems  Review of Systems   Constitutional: Negative  HENT: Negative  Eyes: Negative  Respiratory: Negative  Cardiovascular: Negative  Gastrointestinal: Negative  Endocrine: Negative  Genitourinary:        Per HPI   Musculoskeletal: Negative  Skin: Negative  Allergic/Immunologic: Negative  Neurological: Negative  Hematological: Negative  Psychiatric/Behavioral: Negative            Past Medical History  Past Medical History:   Diagnosis Date    DVT (deep venous thrombosis) (HCC)     Dyslipidemia     Hemorrhoids     [place] (hard of hearing)     Hypertension     Membranous glomerulonephritis     Proteinuria     Pulmonary embolus (HCC)     Renal vein thrombosis (HCC)        Past Social History  Past Surgical History:   Procedure Laterality Date    ADENOIDECTOMY      HEMORRHOID SURGERY      VASECTOMY         Past Family History  Family History   Problem Relation Age of Onset    Colon polyps Mother     Heart disease Father     Hypertension Father     Coronary artery disease Father     Nephrolithiasis Father     No Known Problems Sister     No Known Problems Brother     Diabetes Paternal Grandfather     Stroke Neg Hx     Thyroid disease Neg Hx        Past Social history  Social History     Socioeconomic History    Marital status: /Civil Union     Spouse name: Not on file    Number of children: Not on file    Years of education: Not on file    Highest education level: Not on file   Occupational History    Not on file   Social Needs    Financial resource strain: Not on file    Food insecurity Worry: Not on file     Inability: Not on file    Transportation needs     Medical: Not on file     Non-medical: Not on file   Tobacco Use    Smoking status: Never Smoker    Smokeless tobacco: Never Used   Substance and Sexual Activity    Alcohol use: Yes     Frequency: Monthly or less     Comment: socialy; Never drank alcohol per [employer]     Drug use: No    Sexual activity: Not on file   Lifestyle    Physical activity     Days per week: Not on file     Minutes per session: Not on file    Stress: Not on file   Relationships    Social connections     Talks on phone: Not on file     Gets together: Not on file     Attends Scientologist service: Not on file     Active member of club or organization: Not on file     Attends meetings of clubs or organizations: Not on file     Relationship status: Not on file    Intimate partner violence     Fear of current or ex partner: Not on file     Emotionally abused: Not on file     Physically abused: Not on file     Forced sexual activity: Not on file   Other Topics Concern    Not on file   Social History Narrative    Not on file       Current Medications  Current Outpatient Medications   Medication Sig Dispense Refill    lisinopril (ZESTRIL) 20 mg tablet Take 20 mg by mouth daily      lisinopril (ZESTRIL) 20 mg tablet Take 1 tablet (20 mg total) by mouth daily 90 tablet 3     No current facility-administered medications for this visit  Allergies  Allergies   Allergen Reactions    Molds & Smuts        Past Medical History, Social History, Family History, medications and allergies were reviewed  Vitals  Vitals:    06/08/21 0950   BP: 110/72   Pulse: 82   Weight: 108 kg (237 lb)   Height: 5' 9" (1 753 m)       Physical Exam  Physical Exam      On examination he is in no acute distress  His abdomen is soft obese nontender nondistended   examination reveals a normal phallus  There are no inguinal hernias    The right testicle is normal   The left testicle is normal   There is a large left varicocele which augments with Valsalva  Skin is warm  Extremities without edema  Neurologic is grossly intact and nonfocal   Gait normal   Affect normal   Digital rectal examination reveals a 45 g prostate which is soft and smooth without nodularity      Results  Lab Results   Component Value Date    PSA 0 4 05/19/2021    PSA 0 4 07/19/2019    PSA 0 9 05/22/2018     Lab Results   Component Value Date    GLUCOSE 91 12/17/2015    CALCIUM 9 4 05/19/2021     12/17/2015    K 3 8 05/19/2021    CO2 24 05/19/2021     (H) 05/19/2021    BUN 23 05/19/2021    CREATININE 1 30 05/19/2021     Lab Results   Component Value Date    WBC 5 66 05/19/2021    HGB 15 3 05/19/2021    HCT 45 0 05/19/2021    MCV 92 05/19/2021     05/19/2021         Office Urine Dip  Recent Results (from the past 1 hour(s))   POCT urine dip    Collection Time: 06/08/21  9:54 AM   Result Value Ref Range    LEUKOCYTE ESTERASE,UA -     NITRITE,UA -     SL AMB POCT UROBILINOGEN 0 2     POCT URINE PROTEIN -      PH,UA 6 0     BLOOD,UA hemolyzed     SPECIFIC GRAVITY,UA 1 030     KETONES,UA -     BILIRUBIN,UA -     GLUCOSE, UA -      COLOR,UA yellow     CLARITY,UA clear    ]

## 2021-06-16 DIAGNOSIS — N18.30 CHRONIC KIDNEY DISEASE (CKD), STAGE III (MODERATE) (HCC): ICD-10-CM

## 2021-06-17 RX ORDER — LISINOPRIL 20 MG/1
TABLET ORAL
Qty: 90 TABLET | Refills: 3 | Status: SHIPPED | OUTPATIENT
Start: 2021-06-17 | End: 2022-04-15

## 2021-06-24 NOTE — TELEPHONE ENCOUNTER
Please call Patient and inform confirm tentatively scheduled appointment for follow up on 9/14/2021 at 3:45 pm with Dr Cecilia Artis in [place]; no testing prior  Thank you

## 2021-06-28 ENCOUNTER — OFFICE VISIT (OUTPATIENT)
Dept: BARIATRICS | Facility: CLINIC | Age: 51
End: 2021-06-28
Payer: COMMERCIAL

## 2021-06-28 VITALS
DIASTOLIC BLOOD PRESSURE: 78 MMHG | RESPIRATION RATE: 16 BRPM | BODY MASS INDEX: 35.21 KG/M2 | HEART RATE: 73 BPM | SYSTOLIC BLOOD PRESSURE: 124 MMHG | HEIGHT: 69 IN | WEIGHT: 237.7 LBS | TEMPERATURE: 98.4 F

## 2021-06-28 DIAGNOSIS — Z91.89 AT RISK FOR SLEEP APNEA: ICD-10-CM

## 2021-06-28 DIAGNOSIS — E66.9 OBESITY, CLASS II, BMI 35-39.9: Primary | ICD-10-CM

## 2021-06-28 DIAGNOSIS — I10 ESSENTIAL HYPERTENSION: ICD-10-CM

## 2021-06-28 PROCEDURE — 99214 OFFICE O/P EST MOD 30 MIN: CPT | Performed by: PHYSICIAN ASSISTANT

## 2021-06-28 PROCEDURE — 3074F SYST BP LT 130 MM HG: CPT | Performed by: PHYSICIAN ASSISTANT

## 2021-06-28 PROCEDURE — 3078F DIAST BP <80 MM HG: CPT | Performed by: PHYSICIAN ASSISTANT

## 2021-06-28 NOTE — ASSESSMENT & PLAN NOTE
-Patient is pursuing Conservative Program  -Initial weight loss goal of 5-10% weight loss for improved health      Initial: 237 1 lbs   Current: 237 7 lbs   Change:- 0 6 lbs   Goal:200 lbs     Goals:  Food log (ie ) www myfitnesspal com,sparkpeople  com,loseit com,calorieking  com,etc  baritastic  No sugary beverages  At least 64oz of water daily  -cut out/back on caloric beverages   Increase physical activity by 10 minutes daily   Gradually increase physical activity to a goal of 5 days per week for 30 minutes of MODERATE intensity PLUS 2 days per week of FULL BODY resistance training-30 minute walk 4 times per week   3310-5201 calories per day   [place] yogurt   Grocery shop more frequently cut back on eating out   [employer]

## 2021-06-28 NOTE — PROGRESS NOTES
Assessment/Plan:    Obesity, Class II, BMI 35-39 9  -Patient is pursuing Conservative Program  -Initial weight loss goal of 5-10% weight loss for improved health      Initial: 237 1 lbs   Current: 237 7 lbs   Change:- 0 6 lbs   Goal:200 lbs     Goals:  Food log (ie ) www myfitnesspal com,sparkpeople  com,loseit com,calorieking  com,etc  baritastic  No sugary beverages  At least 64oz of water daily  -cut out/back on caloric beverages   Increase physical activity by 10 minutes daily  Gradually increase physical activity to a goal of 5 days per week for 30 minutes of MODERATE intensity PLUS 2 days per week of FULL BODY resistance training-30 minute walk 4 times per week   3536-1369 calories per day   [place] yogurt   Grocery shop more frequently cut back on eating out   Start logging     At risk for sleep apnea  Has appt on 7/8/21    Essential hypertension  Taking lisinopril  -should improve with weight loss, dietary, and lifestyle changes  -continue management with prescribing provider      Follow up in approximately 2 months with Non-Surgical Physician/Advanced Practitioner  Diagnoses and all orders for this visit:    Obesity, Class II, BMI 35-39 9    At risk for sleep apnea    Essential hypertension          Subjective:   Chief Complaint   Patient presents with    Follow-up     5-6 week MWM follow up         Patient ID: Sunday Vicki  is a 46 y o  male with excess weight/obesity here to pursue weight managment  Patient is pursuing Conservative Program      HPI  Patient notes he has made changes in portion size  Food logging: not logging  Fruit/Vegetable servings: 0-1 servings   Exercise:walking dog in the morning for 20 minutes   Hydration: 3-4 cups of water per day, 2 cups of [place] iced tea per day   2 sodas per week  NO ETOH     B: frosted mini wheats-decreased portion-not measuring with skim milk   S: yogurt   L: turkey wrap with lettuce and american cheese with carrots or nuts and cookies  S:nuts and cheese stick  D: [employer] -1/2 sand of tuna salad and casear salad with chicken or gyros from [place]  S: sometimes-chedder cheese crackers and iced tea     Eating out 3-4 days per week, notes hasn't grocery shopped in over a week so doesn't have some food in the house     Colonoscopy-Completed 12/18/2020    The following portions of the patient's history were reviewed and updated as appropriate: allergies, current medications, past family history, past medical history, past social history, past surgical history and problem list     Review of Systems   HENT: Negative for sore throat  Respiratory: Negative for cough and shortness of breath  Cardiovascular: Negative for chest pain and palpitations  Gastrointestinal: Negative for abdominal pain, constipation, diarrhea, nausea and vomiting         + GERD-diet controlled    Skin: Negative for rash  Psychiatric/Behavioral: Negative for suicidal ideas  Denies depression and anxiety       Objective:    /78 (BP Location: Left arm, Patient Position: Sitting, Cuff Size: Adult)   Pulse 73   Temp 98 4 °F (36 9 °C) (Tympanic)   Resp 16   Ht 5' 8 5" (1 74 m)   Wt 108 kg (237 lb 11 2 oz)   BMI 35 62 kg/m²      Physical Exam  Vitals and nursing note reviewed  Constitutional   General appearance: Abnormal   well developed and morbidly obese  Eyes No conjunctival pallor  Pulmonary   Respiratory effort: No increased work of breathing or signs of respiratory distress  Abdomen   Abdomen: Abnormal   The abdomen was obese     Musculoskeletal   Gait and station: Normal     Psychiatric   Orientation to person, place and time: Normal     Affect: appropriate

## 2021-07-08 ENCOUNTER — OFFICE VISIT (OUTPATIENT)
Dept: SLEEP CENTER | Facility: CLINIC | Age: 51
End: 2021-07-08
Payer: COMMERCIAL

## 2021-07-08 VITALS
WEIGHT: 238.8 LBS | HEART RATE: 71 BPM | BODY MASS INDEX: 35.37 KG/M2 | DIASTOLIC BLOOD PRESSURE: 88 MMHG | SYSTOLIC BLOOD PRESSURE: 134 MMHG | HEIGHT: 69 IN

## 2021-07-08 DIAGNOSIS — I10 ESSENTIAL HYPERTENSION: ICD-10-CM

## 2021-07-08 DIAGNOSIS — E66.9 OBESITY, CLASS II, BMI 35-39.9: ICD-10-CM

## 2021-07-08 DIAGNOSIS — Z91.89 AT RISK FOR SLEEP APNEA: ICD-10-CM

## 2021-07-08 DIAGNOSIS — R06.83 SNORING: Primary | ICD-10-CM

## 2021-07-08 PROCEDURE — 99244 OFF/OP CNSLTJ NEW/EST MOD 40: CPT | Performed by: PSYCHIATRY & NEUROLOGY

## 2021-07-08 PROCEDURE — 1036F TOBACCO NON-USER: CPT | Performed by: PSYCHIATRY & NEUROLOGY

## 2021-07-08 PROCEDURE — 3008F BODY MASS INDEX DOCD: CPT | Performed by: PSYCHIATRY & NEUROLOGY

## 2021-07-08 NOTE — LETTER
July 8, 2021     Naomi MaloneyJigna  [address] [facility] [address] [place] [address]    Patient: Fely Fernandez   YOB: 1970   Date of Visit: 7/8/2021       Dear Dr Kenn Kelley: Thank you for referring Jonathan Dumont to me for evaluation  Below are my notes for this consultation  If you have questions, please do not hesitate to call me  I look forward to following your patient along with you  Sincerely,        Momo Mcclain MD        CC: RACHELL Blandon MD  7/8/2021  3:58 PM  Sign when Signing Visit  Sleep Medicine Consultation Note    HPI:  Mr Fely Fernandez is a 46 y o  male seen at the request of Octavia Krabbe, PA-C for advice regarding suspected sleep disordered breathing  The patient stated that his weight management team suggested that he be evaluated for SERA due to his BMI and snoring  He is working on losing weight without surgery  He denied daytime tiredness and sleepiness  He denied having trouble staying awake at work  The only time he will get sleepy is if he is doing a lot of reading during the day  He can start to feel tired on a long drive  He has fallen asleep in the past and had a near miss in the late 1980s  this has not happened since  He may sleep as a passenger  He does wake up a few times a night to void and he snores       Please see below for continuation of the HPI:      Sleep Disordered Breathing:  -Snoring: yes   -Severity: loudly   -Frequency: unsure   -Duration: unsure   -Over time: unsure   -Modifying factors: unsure  -Observed Apneas: no  -Mouth Breathing at night: sometimes  -Dry Mouth in morning: can be very dry at times    -Nocturnal Gasping: no  -Nasal Obstruction: yes  -Weight: gained 5-10 lbs    Sleep Pattern:  -Location: bedroom   -Bed/Recliner/Wedge: bed  -Bed Partner: no, his wife sleeps in a separate room   -HOB: flat  -# of pillows under head: 1  -Position: side  -Bedtime: 930pm  -Lights out: same time  -Environmental: No lights/TV  -Latency: usually fast  -Awakenings: 2-3   -Reason: void   -Duration: mins  -Wake time: 6am   -Alarm: yes  -Rise time: same time  -Days off: same  -Shift Work: day shift  -Patient's estimate of total sleep time: 5-6h      Daytime Symptoms:  -Upon Awakening: a little tired  -Daytime fatigue/sleepiness: not usually tired or sleepy  -Naps: no  -Involuntary Dozing: no  -Cognitive Symptoms: no  -Driving: Difficulty with sleepiness and driving:  See HPI   -- Close calls related to sleepiness: see HPI   -- Accidents related to sleepiness: no      Questionnaires:  Sitting and reading: Slight chance of dozing  Watching TV: Would never doze  Sitting, inactive in a public place (e g  a theatre or a meeting): Slight chance of dozing  As a passenger in a car for an hour without a break: Slight chance of dozing  Lying down to rest in the afternoon when circumstances permit: Would never doze  Sitting and talking to someone: Would never doze  Sitting quietly after a lunch without alcohol: Would never doze  In a car, while stopped for a few minutes in traffic: Would never doze  Total score: 3      Sleep Review of Symptoms:  -Parasomnias:  --Sleep Walking: no  --Dream Enactment: no  --Bruxism: no  -Motor:  --RLS: yes  --PLMS: no  -Narcolepsy:  --Hallucinations: no  --Paralysis: no  --Cataplexy: no    Childhood Sleep History: denied    Prior Sleep Studies/Evaluations:  none    Family History:  Family history of sleep disorders: denied    Patient Active Problem List   Diagnosis    Chronic pulmonary embolism (HCC)    Chronic kidney disease (CKD), stage III (moderate) (HCC)    Membranous nephropathy determined by biopsy    Essential hypertension    Wellness examination    Decreased hearing of both ears    Screening PSA (prostate specific antigen)    Screening for malignant neoplasm of colon    Screening for diabetes mellitus    Obesity, Class II, BMI 35-39 9    Mild depression ([address] )    At risk for sleep apnea Past Medical History:   Diagnosis Date    DVT (deep venous thrombosis) (HCC)     Dyslipidemia     Hemorrhoids     [place] (hard of hearing)     Hypertension     Membranous glomerulonephritis     Proteinuria     Pulmonary embolus (HCC)     Renal vein thrombosis (HCC)          --> Denies any other cardiopulmonary disease  --> Seizure hx: denies  --> Head injury with LOC: denies  --> Supplemental Oxygen Use: denies    Labs     Results for tSephanie Melton (MRN [id number]) as of 7/8/2021 15:33   Ref  Range 5/19/2021 16:05   Sodium Latest Ref Range: 136 - 145 mmol/L 140   Potassium Latest Ref Range: 3 5 - 5 3 mmol/L 3 8   Chloride Latest Ref Range: 100 - 108 mmol/L 109 (H)   CO2 Latest Ref Range: 21 - 32 mmol/L 24   Anion Gap Latest Ref Range: 4 - 13 mmol/L 7   BUN Latest Ref Range: 5 - 25 mg/dL 23   Creatinine Latest Ref Range: 0 60 - 1 30 mg/dL 1 30   Glucose, Random Latest Ref Range: 65 - 140 mg/dL 80   Calcium Latest Ref Range: 8 3 - 10 1 mg/dL 9 4   Albumin Latest Ref Range: 3 5 - 5 0 g/dL 4 0   eGFR Latest Units: ml/min/1 73sq m 63   Phosphorus Latest Ref Range: 2 7 - 4 5 mg/dL 2 8   WBC Latest Ref Range: 4 31 - 10 16 Thousand/uL 5 66   Red Blood Cell Count Latest Ref Range: 3 88 - 5 62 Million/uL 4 89   Hemoglobin Latest Ref Range: 12 0 - 17 0 g/dL 15 3   HCT Latest Ref Range: 36 5 - 49 3 % 45 0   MCV Latest Ref Range: 82 - 98 fL 92   MCH Latest Ref Range: 26 8 - 34 3 pg 31 3   MCHC Latest Ref Range: 31 4 - 37 4 g/dL 34 0   RDW Latest Ref Range: 11 6 - 15 1 % 12 7   Platelet Count Latest Ref Range: 149 - 390 Thousands/uL 245   MPV Latest Ref Range: 8 9 - 12 7 fL 9 8   Hemoglobin A1C Latest Ref Range: Normal 3 8-5 6%; PreDiabetic 5 7-6 4%;  Diabetic >=6 5%; Glycemic control for adults with diabetes <7 0% % 5 1   eAG, EST AVG Glucose Latest Units: mg/dl 100   PARATHYROID HORMONE Latest Ref Range: 18 4 - 80 1 pg/mL 48 6   EXT Creatinine Urine Latest Units: mg/dL 257 0   Protein Urine Random Latest Units: mg/dL 50   Prot/Creat Ratio, Ur Latest Ref Range: 0 00 - 0 10  0 19 (H)   PSA, Total Latest Ref Range: 0 0 - 4 0 ng/mL 0 4       Past Surgical History:   Procedure Laterality Date    ADENOIDECTOMY      HEMORRHOID SURGERY      VASECTOMY         --> ENT procedures: denies    Current Outpatient Medications   Medication Sig Dispense Refill    lisinopril (ZESTRIL) 20 mg tablet Take 20 mg by mouth daily       lisinopril (ZESTRIL) 20 mg tablet TAKE 1 TABLET DAILY 90 tablet 3     No current facility-administered medications for this visit  Social History:  -Employment: [employer]  -Smoking: no  -Caffeine: once a week will have soda  -Alcohol: once a month  -THC: no  -OTC/Supplements/herbals: no  -Illicits:  denies  -Family: lives with wife    ROS:  Genitourinary need to urinate more than twice a night   Cardiology none   Gastrointestinal none   Neurology none   Constitutional none   Integumentary none   Psychiatry none   Musculoskeletal none   Pulmonary shortness of breath with activity   ENT ringing in ears   Endocrine none   Hematological none       MSE:  -Alert and appropriate: alert, calm, cooperative  -Oriented to person, place and time:  name, age, location, day/date/mon/yr  -Behavior: good, sustained eye contact  -Speech: Unremarkable rate/rhythm/volume  -Mood: "good"  -Affect: full range  -Thought Processes: linear, logical, goal directed  PE:  Body mass index is 35 78 kg/m²    Vitals:    07/08/21 1527   BP: 134/88   Pulse: 71   Weight: 108 kg (238 lb 12 8 oz)   Height: 5' 8 5" (1 74 m)       -General:  In NAD    -Eyes: Conjunctival injection: none     -EOM:  PERRLA, EOMI   -Eyelid hooding: no    -ENT: MP: 4/4   -Facial deformity: no retrognathia   -Hard palate: moderate arch   -Soft palate:  Crowding enlarged uvula and redundant tissue   -Gums and teeth: normal dentition   -Tongue:  Scalloping   -Nares:  Patent    -Neck/Lymphatics: Lymphadenopathy:  none appreciated   -Masses:  none appreciated   -Circumference: Neck Circumference: 16 "    -Cardiac: Auscultation:  RRR   - LE edema over shins: none appreciated    -Pulm: -Respirations: unlaboured         -Auscultation:  CTA bilaterally, posterior fields    -Neuro: No resting tremor     -Musculoskeletal: Gait and stance: normal turning and ambulation; unremarkable  Assessment:  Mr Temi Maravilla is a 46 y o  male who is seen to evaluate for possible obstructive sleep apnea  Given the patient's symptoms of snoring, occasionally daytime tiredness, non-restorative sleep, and nocturia in the context of a narrow airway, chronic sinus issues, and BMI >30, a diagnosis of obstructive sleep apnea is likely  The pathophysiology of, the reasons to treat and treatment options for obstructive sleep apnea were all reviewed with the patient today  Discussed the testing options and reviewed the benefits and downsides of both, patient opted for home sleep apnea testing  He is amenable to treatment with PAP therapy for moderate to severe SERA and will try weight loss for mild SERA  Discussed keeping nasal passages clear, abstaining from alcohol, and other sedating drugs at night- which will worsen symptoms of SERA  --History provided by: patient   --Records reviewed: in chart      Recommendations:  1) HST with in lab PSG if non-diagnostic  2) Driving safety was reviewed with patient  If the patient feels too sleepy to drive he knows not to drive  If he becomes sleepy while driving he will pull over and nap  3) Follow-up after initiation of treatment  4) Call with any questions or concerns  All questions answered for the patient, who indicated understanding and agreed with the plan       Michael Lyman MD  Psychiatry/ Sleep medicine

## 2021-07-08 NOTE — PROGRESS NOTES
Sleep Medicine Consultation Note    HPI:  Mr Charito Mathur is a 46 y o  male seen at the request of Dejah Mina PA-C for advice regarding suspected sleep disordered breathing  The patient stated that his weight management team suggested that he be evaluated for SERA due to his BMI and snoring  He is working on losing weight without surgery  He denied daytime tiredness and sleepiness  He denied having trouble staying awake at work  The only time he will get sleepy is if he is doing a lot of reading during the day  He can start to feel tired on a long drive  He has fallen asleep in the past and had a near miss in the late 1980s  this has not happened since  He may sleep as a passenger  He does wake up a few times a night to void and he snores       Please see below for continuation of the HPI:      Sleep Disordered Breathing:  -Snoring: yes   -Severity: loudly   -Frequency: unsure   -Duration: unsure   -Over time: unsure   -Modifying factors: unsure  -Observed Apneas: no  -Mouth Breathing at night: sometimes  -Dry Mouth in morning: can be very dry at times    -Nocturnal Gasping: no  -Nasal Obstruction: yes  -Weight: gained 5-10 lbs    Sleep Pattern:  -Location: bedroom   -Bed/Recliner/Wedge: bed  -Bed Partner: no, his wife sleeps in a separate room   -HOB: flat  -# of pillows under head: 1  -Position: side  -Bedtime: 930pm  -Lights out: same time  -Environmental: No lights/TV  -Latency: usually fast  -Awakenings: 2-3   -Reason: void   -Duration: mins  -Wake time: 6am   -Alarm: yes  -Rise time: same time  -Days off: same  -Shift Work: day shift  -Patient's estimate of total sleep time: 5-6h      Daytime Symptoms:  -Upon Awakening: a little tired  -Daytime fatigue/sleepiness: not usually tired or sleepy  -Naps: no  -Involuntary Dozing: no  -Cognitive Symptoms: no  -Driving: Difficulty with sleepiness and driving:  See HPI   -- Close calls related to sleepiness: see HPI   -- Accidents related to sleepiness: no      Questionnaires:  Sitting and reading: Slight chance of dozing  Watching TV: Would never doze  Sitting, inactive in a public place (e g  a theatre or a meeting): Slight chance of dozing  As a passenger in a car for an hour without a break: Slight chance of dozing  Lying down to rest in the afternoon when circumstances permit: Would never doze  Sitting and talking to someone: Would never doze  Sitting quietly after a lunch without alcohol: Would never doze  In a car, while stopped for a few minutes in traffic: Would never doze  Total score: 3      Sleep Review of Symptoms:  -Parasomnias:  --Sleep Walking: no  --Dream Enactment: no  --Bruxism: no  -Motor:  --RLS: yes  --PLMS: no  -Narcolepsy:  --Hallucinations: no  --Paralysis: no  --Cataplexy: no    Childhood Sleep History: denied    Prior Sleep Studies/Evaluations:  none    Family History:  Family history of sleep disorders: denied    Patient Active Problem List   Diagnosis    [facility]    Chronic kidney disease (CKD), stage III (moderate) (HCC)    Membranous nephropathy determined by biopsy    Essential hypertension    Wellness examination    Decreased hearing of both ears    Screening PSA (prostate specific antigen)    Screening for malignant neoplasm of colon    Screening for diabetes mellitus    Obesity, Class II, BMI 35-39 9    Mild depression ([facility] )    At risk for sleep apnea     Past Medical History:   Diagnosis Date    DVT (deep venous thrombosis) ([facility] )     Dyslipidemia     Hemorrhoids     [place] (hard of hearing)     Hypertension     Membranous glomerulonephritis     Proteinuria     Pulmonary embolus ([facility] )     Renal vein thrombosis (HCC)          --> Denies any other cardiopulmonary disease  --> Seizure hx: denies  --> Head injury with LOC: denies  --> Supplemental Oxygen Use: denies    Labs     Results for Rodolfo Azevedo (MRN [id number]) as of 7/8/2021 15:33   Ref   Range 5/19/2021 16:05   Sodium Latest Ref Range: 136 - 145 mmol/L 140   Potassium Latest Ref Range: 3 5 - 5 3 mmol/L 3 8   Chloride Latest Ref Range: 100 - 108 mmol/L 109 (H)   CO2 Latest Ref Range: 21 - 32 mmol/L 24   Anion Gap Latest Ref Range: 4 - 13 mmol/L 7   BUN Latest Ref Range: 5 - 25 mg/dL 23   Creatinine Latest Ref Range: 0 60 - 1 30 mg/dL 1 30   Glucose, Random Latest Ref Range: 65 - 140 mg/dL 80   Calcium Latest Ref Range: 8 3 - 10 1 mg/dL 9 4   Albumin Latest Ref Range: 3 5 - 5 0 g/dL 4 0   eGFR Latest Units: ml/min/1 73sq m 63   Phosphorus Latest Ref Range: 2 7 - 4 5 mg/dL 2 8   WBC Latest Ref Range: 4 31 - 10 16 Thousand/uL 5 66   Red Blood Cell Count Latest Ref Range: 3 88 - 5 62 Million/uL 4 89   Hemoglobin Latest Ref Range: 12 0 - 17 0 g/dL 15 3   HCT Latest Ref Range: 36 5 - 49 3 % 45 0   MCV Latest Ref Range: 82 - 98 fL 92   MCH Latest Ref Range: 26 8 - 34 3 pg 31 3   MCHC Latest Ref Range: 31 4 - 37 4 g/dL 34 0   RDW Latest Ref Range: 11 6 - 15 1 % 12 7   Platelet Count Latest Ref Range: 149 - 390 Thousands/uL 245   MPV Latest Ref Range: 8 9 - 12 7 fL 9 8   Hemoglobin A1C Latest Ref Range: Normal 3 8-5 6%; PreDiabetic 5 7-6 4%; Diabetic >=6 5%; Glycemic control for adults with diabetes <7 0% % 5 1   eAG, EST AVG Glucose Latest Units: mg/dl 100   PARATHYROID HORMONE Latest Ref Range: 18 4 - 80 1 pg/mL 48 6   EXT Creatinine Urine Latest Units: mg/dL 257 0   Protein Urine Random Latest Units: mg/dL 50   Prot/Creat Ratio, Ur Latest Ref Range: 0 00 - 0 10  0 19 (H)   PSA, Total Latest Ref Range: 0 0 - 4 0 ng/mL 0 4       Past Surgical History:   Procedure Laterality Date    ADENOIDECTOMY      HEMORRHOID SURGERY      VASECTOMY         --> ENT procedures: denies    Current Outpatient Medications   Medication Sig Dispense Refill    lisinopril (ZESTRIL) 20 mg tablet Take 20 mg by mouth daily       lisinopril (ZESTRIL) 20 mg tablet TAKE 1 TABLET DAILY 90 tablet 3     No current facility-administered medications for this visit           Social History:  -Employment:  administration  -Smoking: no  -Caffeine: once a week will have soda  -Alcohol: once a month  -THC: no  -OTC/Supplements/herbals: no  -Illicits:  denies  -Family: lives with wife    ROS:  Genitourinary need to urinate more than twice a night   Cardiology none   Gastrointestinal none   Neurology none   Constitutional none   Integumentary none   Psychiatry none   Musculoskeletal none   Pulmonary shortness of breath with activity   ENT ringing in ears   Endocrine none   Hematological none       MSE:  -Alert and appropriate: alert, calm, cooperative  -Oriented to person, place and time:  name, age, location, day/date/mon/yr  -Behavior: good, sustained eye contact  -Speech: Unremarkable rate/rhythm/volume  -Mood: "good"  -Affect: full range  -Thought Processes: linear, logical, goal directed  PE:  Body mass index is 35 78 kg/m²  Vitals:    07/08/21 1527   BP: 134/88   Pulse: 71   Weight: 108 kg (238 lb 12 8 oz)   Height: 5' 8 5" (1 74 m)       -General:  In NAD    -Eyes: Conjunctival injection: none     -EOM:  PERRLA, EOMI   -Eyelid hooding: no    -ENT: MP: 4/4   -Facial deformity: no retrognathia   -Hard palate: moderate arch   -Soft palate:  Crowding enlarged uvula and redundant tissue   -Gums and teeth: normal dentition   -Tongue:  Scalloping   -Nares:  Patent    -Neck/Lymphatics: Lymphadenopathy:  none appreciated   -Masses:  none appreciated   -Circumference: Neck Circumference: 16 "    -Cardiac: Auscultation:  RRR   - LE edema over shins: none appreciated    -Pulm: -Respirations: unlaboured         -Auscultation:  CTA bilaterally, posterior fields    -Neuro: No resting tremor     -Musculoskeletal: Gait and stance: normal turning and ambulation; unremarkable  Assessment:  Mr Olvin Aguero is a 46 y o  male who is seen to evaluate for possible obstructive sleep apnea    Given the patient's symptoms of snoring, occasionally daytime tiredness, non-restorative sleep, and nocturia in the context of a narrow airway, chronic sinus issues, and BMI >30, a diagnosis of obstructive sleep apnea is likely  The pathophysiology of, the reasons to treat and treatment options for obstructive sleep apnea were all reviewed with the patient today  Discussed the testing options and reviewed the benefits and downsides of both, patient opted for home sleep apnea testing  He is amenable to treatment with PAP therapy for moderate to severe SERA and will try weight loss for mild SERA  Discussed keeping nasal passages clear, abstaining from alcohol, and other sedating drugs at night- which will worsen symptoms of SERA  --History provided by: patient   --Records reviewed: in chart      Recommendations:  1) HST with in lab PSG if non-diagnostic  2) Driving safety was reviewed with patient  If the patient feels too sleepy to drive he knows not to drive  If he becomes sleepy while driving he will pull over and nap  3) Follow-up after initiation of treatment  4) Call with any questions or concerns  All questions answered for the patient, who indicated understanding and agreed with the plan       Michael Lyman MD  Psychiatry/ Sleep medicine

## 2021-07-22 ENCOUNTER — OFFICE VISIT (OUTPATIENT)
Dept: NEPHROLOGY | Facility: CLINIC | Age: 51
End: 2021-07-22
Payer: COMMERCIAL

## 2021-07-22 VITALS
BODY MASS INDEX: 35.31 KG/M2 | DIASTOLIC BLOOD PRESSURE: 88 MMHG | HEIGHT: 68 IN | WEIGHT: 233 LBS | SYSTOLIC BLOOD PRESSURE: 138 MMHG | HEART RATE: 72 BPM

## 2021-07-22 DIAGNOSIS — I10 ESSENTIAL HYPERTENSION: ICD-10-CM

## 2021-07-22 DIAGNOSIS — N02.2 MEMBRANOUS NEPHROPATHY DETERMINED BY BIOPSY: Primary | ICD-10-CM

## 2021-07-22 DIAGNOSIS — N18.31 STAGE 3A CHRONIC KIDNEY DISEASE (HCC): ICD-10-CM

## 2021-07-22 DIAGNOSIS — E66.9 OBESITY, CLASS II, BMI 35-39.9: ICD-10-CM

## 2021-07-22 PROCEDURE — 3075F SYST BP GE 130 - 139MM HG: CPT | Performed by: INTERNAL MEDICINE

## 2021-07-22 PROCEDURE — 3008F BODY MASS INDEX DOCD: CPT | Performed by: NURSE PRACTITIONER

## 2021-07-22 PROCEDURE — 99214 OFFICE O/P EST MOD 30 MIN: CPT | Performed by: INTERNAL MEDICINE

## 2021-07-22 PROCEDURE — 3079F DIAST BP 80-89 MM HG: CPT | Performed by: INTERNAL MEDICINE

## 2021-07-22 NOTE — PATIENT INSTRUCTIONS
As we discussed in the office visit, based on your most recent blood test and urine test, your kidney function remains stable and your proteinuria remains minimal   I would like to see you back in the office in 1 year with repeat blood and urine test   Please check your blood pressure at home for the next 2 weeks and contact us with blood pressure readings  Follow low-salt diet  Keep working on losing weight  Continue regular follow-up with your primary care doctor

## 2021-07-22 NOTE — PROGRESS NOTES
OFFICE FOLLOW UP - Nephrology   Maura Martinez 46 y o  male MRN: [id number]       ASSESSMENT and PLAN:  Óscar Kapoor was seen today for follow-up and chronic kidney disease  Diagnoses and all orders for this visit:    Membranous nephropathy determined by biopsy  -     CBC; Future  -     PTH, intact; Future  -     Renal function panel; Future  -     Protein / creatinine ratio, urine; Future    Stage 3a chronic kidney disease (HCC)  -     CBC; Future  -     PTH, intact; Future  -     Renal function panel; Future  -     Protein / creatinine ratio, urine; Future    Essential hypertension    Obesity, Class II, BMI 35-39 9        This is a 46years-old gentleman with history of chronic kidney disease stage 2/3 secondary to biopsy-proven idiopathic membranous glomerulonephritis, previously treated with prednisone and tacrolimus, he has been off steroids since June 2015, off tacrolimus since October 2016, he returns for a year follow-up  1   Chronic kidney disease stage 2/3A, most recent creatinine is stable at 1 3, proteinuria minimal at 0 19 based on the last UPC  Creatinine around 1 3 to 1 5 since 2014  Blood pressure today is slightly elevated  Advised to lose weight  Advised to check blood pressure at home at least 2 to 3 times a week and contact us with 2 weeks of blood pressure readings  Blood pressure remains elevated with increased dose of Ace inhibitors  Continue with low-salt diet  I would like to see him back in the office in 1 year  Avoid NSAIDs  2   Hypertension, blood pressure elevated today, on lisinopril 20 mg daily  Follow low-salt diet  Advised to check blood pressure at home for the next 2 weeks and contact us with blood pressure readings    3  Biopsy proving idiopathic membranous glomerulonephritis, previously treated with combination of steroids and tacrolimus, off any treatment since October 2016  Most recent proteinuria 0 19 on Ace inhibitors  4   Hemoglobin normal in 2021      5  Obesity, advised to lose weight  6  Left varicocele, continue follow-up with Urology, given most recent blood test no contraindication for contrasted imaging studies at this moment  Patient Instructions   As we discussed in the office visit, based on your most recent blood test and urine test, your kidney function remains stable and your proteinuria remains minimal   I would like to see you back in the office in 1 year with repeat blood and urine test   Please check your blood pressure at home for the next 2 weeks and contact us with blood pressure readings  Follow low-salt diet  Keep working on losing weight  Continue regular follow-up with your primary care doctor  HPI: Josefa Aj is a 46 y o  male who is here for Follow-up and Chronic Kidney Disease    Last time seen was May 2020 as a telemedicine appointment  Today returns for year follow-up  Since our last visit, there has been no ER visits or hospitilizations  Patient currently has no complaints at this time and is feeling well  Patient denies any chest pain, shortness of breath or leg swelling  Denies any urinary problems, no significant foamy urine  Denies any abdominal pain, no nausea, no vomiting, no diarrhea constipation  Previously developed some left lower abdomen pain, was seen by Urology and was suspected to have left varicocele, patient states that over the last couple of weeks his pain resolved     The last blood work was done on 05/19/2021, which we have reviewed together  Recent creatinine 1 30 estimated GFR of 63, most recent UPC 0 1      ROS: All the systems were reviewed and were negative except as documented on the H&P          Allergies: Molds & smuts    Medications:   Current Outpatient Medications:     lisinopril (ZESTRIL) 20 mg tablet, Take 20 mg by mouth daily , Disp: , Rfl:     lisinopril (ZESTRIL) 20 mg tablet, TAKE 1 TABLET DAILY (Patient not taking: Reported on 7/22/2021), Disp: 90 tablet, Rfl: 3    Past Medical History:   Diagnosis Date    DVT (deep venous thrombosis) (HCC)     Dyslipidemia     Hemorrhoids     [place] (hard of hearing)     Hypertension     Membranous glomerulonephritis     Proteinuria     Pulmonary embolus (HCC)     Renal vein thrombosis (HCC)      Past Surgical History:   Procedure Laterality Date    ADENOIDECTOMY      HEMORRHOID SURGERY      VASECTOMY       Family History   Problem Relation Age of Onset    Colon polyps Mother     Heart disease Father     Hypertension Father     Coronary artery disease Father     Nephrolithiasis Father     No Known Problems Sister     No Known Problems Brother     Diabetes Paternal Grandfather     Stroke Neg Hx     Thyroid disease Neg Hx       reports that he has never smoked  He has never used smokeless tobacco  He reports current alcohol use  He reports that he does not use drugs  Physical Exam:   Vitals:    07/22/21 1447   BP: 138/88   BP Location: Left arm   Patient Position: Sitting   Cuff Size: Standard   Pulse: 72   Weight: 106 kg (233 lb)   Height: 5' 8" (1 727 m)     Body mass index is 35 43 kg/m²      General: conscious, cooperative, in not acute distress  Eyes: conjunctivae pink, anicteric sclerae  ENT: lips and mucous membranes moist  Neck: supple, no JVD  Chest: clear breath sounds bilateral, no crackles, ronchus or wheezings  CVS: distinct S1 & S2, normal rate, regular rhythm  Abdomen: soft, non-tender, non-distended, normoactive bowel sounds  Back: no CVA tenderness  Extremities: no edema of both legs  Skin: no rash  Neuro: awake, alert, oriented          Laboratory Results:  Lab Results   Component Value Date    WBC 5 66 05/19/2021    HGB 15 3 05/19/2021    HCT 45 0 05/19/2021    MCV 92 05/19/2021     05/19/2021     Lab Results   Component Value Date    SODIUM 140 05/19/2021    K 3 8 05/19/2021     (H) 05/19/2021    CO2 24 05/19/2021    BUN 23 05/19/2021    CREATININE 1 30 05/19/2021    GLUC 80 05/19/2021    CALCIUM 9 4 05/19/2021       Lab Results   Component Value Date    PTH 48 6 05/19/2021    CALCIUM 9 4 05/19/2021    PHOS 2 8 05/19/2021         Portions of the record may have been created with voice recognition software  Occasional wrong word or "sound a like" substitutions may have occurred due to the inherent limitations of voice recognition software  Read the chart carefully and recognize, using context, where substitutions have occurred  If you have any questions, please contact the dictating provider

## 2021-08-03 ENCOUNTER — OFFICE VISIT (OUTPATIENT)
Dept: INTERNAL MEDICINE CLINIC | Facility: CLINIC | Age: 51
End: 2021-08-03
Payer: COMMERCIAL

## 2021-08-03 DIAGNOSIS — J02.9 SORETHROAT: ICD-10-CM

## 2021-08-03 DIAGNOSIS — R05.9 COUGH: ICD-10-CM

## 2021-08-03 DIAGNOSIS — Z20.822 EXPOSURE TO COVID-19 VIRUS: ICD-10-CM

## 2021-08-03 DIAGNOSIS — R68.83 CHILLS: ICD-10-CM

## 2021-08-03 DIAGNOSIS — R68.89 FLU-LIKE SYMPTOMS: Primary | ICD-10-CM

## 2021-08-03 PROCEDURE — U0003 INFECTIOUS AGENT DETECTION BY NUCLEIC ACID (DNA OR RNA); SEVERE ACUTE RESPIRATORY SYNDROME CORONAVIRUS 2 (SARS-COV-2) (CORONAVIRUS DISEASE [COVID-19]), AMPLIFIED PROBE TECHNIQUE, MAKING USE OF HIGH THROUGHPUT TECHNOLOGIES AS DESCRIBED BY CMS-2020-01-R: HCPCS | Performed by: INTERNAL MEDICINE

## 2021-08-03 PROCEDURE — U0005 INFEC AGEN DETEC AMPLI PROBE: HCPCS | Performed by: INTERNAL MEDICINE

## 2021-08-03 PROCEDURE — 99212 OFFICE O/P EST SF 10 MIN: CPT | Performed by: GENERAL ACUTE CARE HOSPITAL

## 2021-08-03 PROCEDURE — 3725F SCREEN DEPRESSION PERFORMED: CPT | Performed by: GENERAL ACUTE CARE HOSPITAL

## 2021-08-03 NOTE — PROGRESS NOTES
Virtual Regular Visit    Verification of patient location:    Patient is located in the following state in which I hold an active license [place]      Assessment/Plan:    Problem List Items Addressed This Visit        Other    Flu-like symptoms - Primary     · Patient reports dry cough , sore throat , chills and mild headaches for 3 days  His wife has the same symptoms and she was recently exposed to a friend who was tested positive for [address]  · Patient is not vaccinated against [address] , Patient will go to [facility] to have it done  · Instructed patient to maintain good hydration and take Tylenol 650 mg every 6 hours as needed for fever and mild pain  · Mucinix as needed for cough  · Self isolation and rest till results are back  · Instruct patient to call our office or go to ED if developed severe shortness of breath  · Further instructions pending COVID19 results  Other Visit Diagnoses     Exposure to COVID-19 virus        Relevant Orders    Novel Coronavirus (COVID-19), PCR [facility] Collected at [facility] or [facility]    Cough        Relevant Orders    Novel Coronavirus (COVID-19), PCR [facility] Collected at [facility] or [facility]    Sorethroat        Relevant Orders    Novel Coronavirus (OUJIN-64), PCR [facility]    Chills        Relevant Orders    Novel Coronavirus (COVID-19), PCR [facility]               Reason for visit is   Chief Complaint   Patient presents with    Virtual Regular Visit        Encounter provider Joe Nelson MD    Provider located at [address] [place] [id number]      Recent Visits  No visits were found meeting these conditions    Showing recent visits within past 7 days and meeting all other requirements  Today's Visits  Date Type Provider Dept   08/03/21 Office Visit Joe Nelson MD [address] [facility] today's visits and meeting all other requirements  Future Appointments  No visits were found meeting these conditions  Showing future appointments within next 150 days and meeting all other requirements       The patient was identified by name and date of birth  Gavin Allen was informed that this is a telemedicine visit and that the visit is being conducted through [address] [place] [address] [facility] and patient was informed that this is a secure, HIPAA-compliant platform  He agrees to proceed     My office door was closed  No one else was in the room  He acknowledged consent and understanding of privacy and security of the video platform  The patient has agreed to participate and understands they can discontinue the visit at any time  Patient is aware this is a billable service  Subjective  Gavin Allen is a 46 y o  male with Flu like symptoms   46year old male patient is being evaluated virtually for complaints of dry cough , headache , body aches , sore throat and chills x 3-4 days  He denies fever, chest pain , shortness of breath or diarrhea  His wife has the same complaints and was recently exposed to friend who was tested positive for [address]  He is not vaccinated against COVID19  Past Medical History:   Diagnosis Date    DVT (deep venous thrombosis) (HCC)     Dyslipidemia     Hemorrhoids     [place] (hard of hearing)     Hypertension     Membranous glomerulonephritis     Proteinuria     Pulmonary embolus (HCC)     Renal vein thrombosis (HCC)        Past Surgical History:   Procedure Laterality Date    ADENOIDECTOMY      HEMORRHOID SURGERY      VASECTOMY         Current Outpatient Medications   Medication Sig Dispense Refill    lisinopril (ZESTRIL) 20 mg tablet Take 20 mg by mouth daily       lisinopril (ZESTRIL) 20 mg tablet TAKE 1 TABLET DAILY (Patient not taking: Reported on 7/22/2021) 90 tablet 3     No current facility-administered medications for this visit          Allergies   Allergen Reactions    Molds & Smuts        Review of Systems   Constitutional: Positive for chills  Negative for activity change and fever  HENT: Positive for sore throat and voice change  Negative for trouble swallowing  Eyes: Negative  Respiratory: Positive for cough  Negative for shortness of breath  Cardiovascular: Negative for chest pain and palpitations  Gastrointestinal: Negative  Genitourinary: Negative  Musculoskeletal: Positive for myalgias  Neurological: Positive for headaches (mild)  Video Exam    There were no vitals filed for this visit  Physical Exam  Constitutional:       General: He is not in acute distress  Appearance: He is not ill-appearing, toxic-appearing or diaphoretic  Neurological:      Mental Status: He is alert and oriented to person, place, and time  I spent 20 minutes directly with the patient during this visit    [address] verbally agrees to participate in [employer]  Pt is aware that [employer] could be limited without vital signs or the ability to perform a full hands-on physical exam  Jung Badillo understands he or the provider may request at any time to terminate the video visit and request the patient to seek care or treatment in person

## 2021-08-03 NOTE — ASSESSMENT & PLAN NOTE
· Patient reports dry cough , sore throat , chills and mild headaches for 3 days  His wife has the same symptoms and she was recently exposed to a friend who was tested positive for [address]  · Patient is not vaccinated against [address] , Patient will go to [facility] to have it done  · Instructed patient to maintain good hydration and take Tylenol 650 mg every 6 hours as needed for fever and mild pain  · Mucinix as needed for cough  · Self isolation and rest till results are back  · Instruct patient to call our office or go to ED if developed severe shortness of breath  · Further instructions pending COVID19 results

## 2021-08-05 ENCOUNTER — TELEMEDICINE (OUTPATIENT)
Dept: INTERNAL MEDICINE CLINIC | Facility: CLINIC | Age: 51
End: 2021-08-05
Payer: COMMERCIAL

## 2021-08-05 ENCOUNTER — TELEPHONE (OUTPATIENT)
Dept: NEPHROLOGY | Facility: CLINIC | Age: 51
End: 2021-08-05

## 2021-08-05 ENCOUNTER — HOSPITAL ENCOUNTER (OUTPATIENT)
Dept: INFUSION CENTER | Facility: HOSPITAL | Age: 51
Discharge: HOME | End: 2021-08-05
Payer: COMMERCIAL

## 2021-08-05 VITALS
SYSTOLIC BLOOD PRESSURE: 128 MMHG | OXYGEN SATURATION: 99 % | HEART RATE: 88 BPM | DIASTOLIC BLOOD PRESSURE: 79 MMHG | TEMPERATURE: 98.1 F | RESPIRATION RATE: 18 BRPM

## 2021-08-05 DIAGNOSIS — I10 ESSENTIAL HYPERTENSION: ICD-10-CM

## 2021-08-05 DIAGNOSIS — E66.9 OBESITY, CLASS II, BMI 35-39.9: ICD-10-CM

## 2021-08-05 DIAGNOSIS — U07.1 COVID-19 VIRUS INFECTION: Primary | ICD-10-CM

## 2021-08-05 DIAGNOSIS — I10 ESSENTIAL HYPERTENSION: Primary | ICD-10-CM

## 2021-08-05 PROCEDURE — M0243 CASIRIVI AND IMDEVI INFUSION: HCPCS | Performed by: INTERNAL MEDICINE

## 2021-08-05 PROCEDURE — 99213 OFFICE O/P EST LOW 20 MIN: CPT | Performed by: INTERNAL MEDICINE

## 2021-08-05 RX ORDER — SODIUM CHLORIDE 9 MG/ML
20 INJECTION, SOLUTION INTRAVENOUS ONCE
Status: CANCELLED | OUTPATIENT
Start: 2021-08-05

## 2021-08-05 RX ORDER — ONDANSETRON 2 MG/ML
4 INJECTION INTRAMUSCULAR; INTRAVENOUS ONCE AS NEEDED
Status: CANCELLED | OUTPATIENT
Start: 2021-08-05

## 2021-08-05 RX ORDER — ONDANSETRON 2 MG/ML
4 INJECTION INTRAMUSCULAR; INTRAVENOUS ONCE AS NEEDED
Status: CANCELLED | OUTPATIENT
Start: 2021-08-06

## 2021-08-05 RX ORDER — ACETAMINOPHEN 325 MG/1
650 TABLET ORAL ONCE AS NEEDED
Status: CANCELLED | OUTPATIENT
Start: 2021-08-05

## 2021-08-05 RX ORDER — ALBUTEROL SULFATE 90 UG/1
3 AEROSOL, METERED RESPIRATORY (INHALATION) ONCE AS NEEDED
Status: CANCELLED | OUTPATIENT
Start: 2021-08-05

## 2021-08-05 RX ORDER — ALBUTEROL SULFATE 90 UG/1
3 AEROSOL, METERED RESPIRATORY (INHALATION) ONCE AS NEEDED
Status: CANCELLED | OUTPATIENT
Start: 2021-08-06

## 2021-08-05 RX ORDER — ALBUTEROL SULFATE 90 UG/1
3 AEROSOL, METERED RESPIRATORY (INHALATION) ONCE AS NEEDED
Status: DISCONTINUED | OUTPATIENT
Start: 2021-08-05 | End: 2021-08-08 | Stop reason: HOSPADM

## 2021-08-05 RX ORDER — SODIUM CHLORIDE 9 MG/ML
20 INJECTION, SOLUTION INTRAVENOUS ONCE
Status: CANCELLED | OUTPATIENT
Start: 2021-08-06

## 2021-08-05 RX ORDER — ACETAMINOPHEN 325 MG/1
650 TABLET ORAL ONCE AS NEEDED
Status: CANCELLED | OUTPATIENT
Start: 2021-08-06

## 2021-08-05 RX ORDER — ONDANSETRON 2 MG/ML
4 INJECTION INTRAMUSCULAR; INTRAVENOUS ONCE AS NEEDED
Status: DISCONTINUED | OUTPATIENT
Start: 2021-08-05 | End: 2021-08-08 | Stop reason: HOSPADM

## 2021-08-05 RX ORDER — ACETAMINOPHEN 325 MG/1
650 TABLET ORAL ONCE AS NEEDED
Status: DISCONTINUED | OUTPATIENT
Start: 2021-08-05 | End: 2021-08-08 | Stop reason: HOSPADM

## 2021-08-05 RX ORDER — SODIUM CHLORIDE 9 MG/ML
20 INJECTION, SOLUTION INTRAVENOUS ONCE
Status: COMPLETED | OUTPATIENT
Start: 2021-08-05 | End: 2021-08-05

## 2021-08-05 RX ADMIN — IMDEVIMAB: 1332 INJECTION, SOLUTION, CONCENTRATE INTRAVENOUS at 18:23

## 2021-08-05 RX ADMIN — SODIUM CHLORIDE 20 ML/HR: 0.9 INJECTION, SOLUTION INTRAVENOUS at 18:25

## 2021-08-05 NOTE — PROGRESS NOTES
COVID-19 Outpatient Progress Note    Assessment/Plan:    Problem List Items Addressed This Visit     None         Disposition:     I recommended continued isolation until at least 24 hours have passed since recovery defined as resolution of fever without the use of fever-reducing medications AND improvement in COVID symptoms AND 10 days have passed since onset of symptoms (or 10 days have passed since date of first positive viral diagnostic test for asymptomatic patients)  Patient is at increased risk of progressing towards severe COVID-19 due to the following high risk criteria:   - Obesity or being overweight  - Chronic kidney disease  - Cardiovascular disease    Patient meets criteria for Casirivimab/Imdevimab infusion  They were counseled in regards to risks, benefits, and side effects of this infusion  Casirivimab and imdevimab are investigational medicines used to treat mild to moderate symptoms of COVID-19 in non-hospitalized adults and adolescents (15years of age and older who weigh at least 80 pounds (40 kg)), and who are at high risk for developing severe COVID-19 symptoms or the need for hospitalization  Casirivimab and imdevimab are investigational because they are still being studied  There is limited information known about the safety and effectiveness of using casirivimab and imdevimab to treat people with COVID-19  The FDA has authorized the emergency use of casirivimab and imdevimab for the treatment of COVID-19 under an Emergency Use Authorization (EUA)  Possible side effects of casirivimab and imdevimab: Allergic reactions can happen during and after infusion with casirivimab and imdevimab  Possible reactions include: fever, chills, nausea, headache, shortness of breath, low blood pressure, wheezing, swelling of your lips, face, or throat, rash including hives, itching, muscle aches, and dizziness      The side effects of getting any medicine by vein may include brief pain, bleeding, bruising of the skin, soreness, swelling, and possible infection at the infusion site  These are not all the possible side effects of casirivimab and imdevimab  Not a lot of people have been given casirivimab and imdevimab  Serious and unexpected side effects may happen  Casirivimab and imdevimab are still being studied so it is possible that all of the risks are not known at this time  It is possible that casirivimab and imdevimab could interfere with your body's own ability to fight off a future infection of SARS-CoV-2  Similarly, casirivimab and imdevimab may reduce your body's immune response to a vaccine for SARS-CoV-2  Specific studies have not been conducted to address these possible risks  Emergency Use Authorization:    The [facility] FDA has made casirivimab and imdevimab available under an emergency access mechanism called an EUA  The EUA is supported by a  of Health and Human Service ([facility]) declaration that circumstances exist to justify the emergency use of drugs and biological products during the COVID-19 pandemic  Casirivimab and imdevimab have not undergone the same type of review as an FDA-approved or cleared product  The FDA may issue an EUA when certain criteria are met, which includes that there are no adequate, approved, available alternatives  In addition, the FDA decision is based on the totality of scientific evidence available showing that it is reasonable to believe that the product meets certain criteria for safety, performance, and labeling and may be effective in treatment of patients during the COVID-19 pandemic  All of these criteria must be met to allow for the product to be used in the treatment of patients during the COVID-19 pandemic       The EUA for casirivimab and imdevimab is in effect for the duration of the COVID-19 declaration justifying emergency use of these products, unless terminated or revoked (after which the products may no longer be used)      Regarding COVID-19 Vaccination:    Currently there is no data or safety or efficacy of COVID-19 vaccination in persons who received monoclonal antibodies as part of COVID-19 treatment  Treatment should be deferred for at least 90 days to avoid interference of the treatment with vaccine-induced immune responses (this is based on estimated half-life of therapies and evidence suggesting reinfection is uncommon within 90 days of initial infection)  The patient consents to proceed with casirivimab and imdevimab infusion  I have spent 15 minutes directly with the patient  Greater than 50% of this time was spent in counseling/coordination of care regarding: diagnostic results, prognosis, risks and benefits of treatment options, instructions for management and patient and family education  Verification of patient location:    Patient is located in the following state in which I hold an active license [place]    Encounter provider Ravindra Holland MD    Provider located at [address] [place] [phone]    Recent Visits  Date Type Provider Dept   08/03/21 Office Visit Tre Mauro MD [address] [facility] recent visits within past 7 days and meeting all other requirements  Future Appointments  No visits were found meeting these conditions  Showing future appointments within next 150 days and meeting all other requirements       Patient agrees to participate in a virtual check in via telephone or video visit instead of presenting to the office to address urgent/immediate medical needs  Patient is aware this is a billable service  After connecting through [facility], the patient was identified by name and date of birth  Juan Pablo Alt was informed that this was a telemedicine visit and that the exam was being conducted confidentially over secure lines  My office door was closed   The patient was notified the following individuals were present in the room: dr Hipolito Jesus   Juan Pablo Smiley acknowledged consent and understanding of privacy and security of the telemedicine visit  I informed the patient that I have reviewed his record in Epic and presented the opportunity for him to ask any questions regarding the visit today  The patient agreed to participate  Subjective:   Juan Pablo Smiley is a 46 y o  male who has been screened for COVID-19  Symptom change since last report: improving  Patient's symptoms include chills, nasal congestion, rhinorrhea, cough and myalgias  Patient denies fever, sore throat, anosmia, loss of taste, shortness of breath, chest tightness, nausea, vomiting, diarrhea and headaches  Dariel Loera has been staying home and has isolated themselves in his home  He is taking care to not share personal items and is cleaning all surfaces that are touched often, like counters, tabletops, and doorknobs using household cleaning sprays or wipes  He is wearing a mask when he leaves his room  Date of symptom onset: 7/28/2021  Date of positive COVID-19 PCR: 8/3/2021    Lab Results   Component Value Date    SARSCOV2 Positive (A) 08/03/2021     Past Medical History:   Diagnosis Date    DVT (deep venous thrombosis) (HCC)     Dyslipidemia     Hemorrhoids     [place] (hard of hearing)     Hypertension     Membranous glomerulonephritis     Proteinuria     Pulmonary embolus (HCC)     Renal vein thrombosis (HCC)      Past Surgical History:   Procedure Laterality Date    ADENOIDECTOMY      HEMORRHOID SURGERY      VASECTOMY       Current Outpatient Medications   Medication Sig Dispense Refill    lisinopril (ZESTRIL) 20 mg tablet Take 20 mg by mouth daily       lisinopril (ZESTRIL) 20 mg tablet TAKE 1 TABLET DAILY (Patient not taking: Reported on 7/22/2021) 90 tablet 3     No current facility-administered medications for this visit       Allergies   Allergen Reactions    Molds & Smuts        Review of Systems Constitutional: Positive for chills  Negative for fever  HENT: Positive for congestion and rhinorrhea  Negative for sore throat  Respiratory: Positive for cough  Negative for chest tightness and shortness of breath  Gastrointestinal: Negative for diarrhea, nausea and vomiting  Musculoskeletal: Positive for myalgias  Neurological: Negative for headaches  Objective: There were no vitals filed for this visit  Physical Exam    VIRTUAL VISIT DISCLAIMER    Griffin Menon verbally agrees to participate in [employer]  Pt is aware that [employer] could be limited without vital signs or the ability to perform a full hands-on physical exam  Jung A Doyle Poke understands he or the provider may request at any time to terminate the video visit and request the patient to seek care or treatment in person

## 2021-08-05 NOTE — TELEPHONE ENCOUNTER
Patient called and stated he recently tested positive for Covid, he was wondering if there was anything specific to do for his kidneys  I reiterated avoiding NSAIDS, but he is OK to use Tylenol should he need it  I encouraged him to stay hydrated and to contact his PCP  I asked him to call the office should his PCP feel he needs additional medications

## 2021-08-05 NOTE — PROGRESS NOTES
Patient is here today for their Regeneron (Casirivimab and Imdevimab) infusion  Reviewed EUA with patient  Patient verbalized understanding of EUA  Copy of EUA given to patient  All questions answered to patients satisfaction  IV started, good blood return, flushes freely  Patient tolerated well  Patient gave verbal consent to receive treatment

## 2021-08-05 NOTE — TELEPHONE ENCOUNTER
I called patient back, no answer, left a message  Recommend to stay well-hydrated, take Tylenol as needed as instructed by primary care doctor      If any acute changes or worsening symptoms, then she should be seen in the emergency department

## 2021-08-05 NOTE — PROGRESS NOTES
Patient tolerated Regeneron (Casirivimab and Imdevimab) infusion and 1 hour post observation without incident  IV discontinued, catheter intact  Gauze applied to site  Discharge instructions reviewed with patient verbally  Copy of discharge instructions given to patient  Patient verbalized understanding of all discharge instructions  Patient discharged without incident

## 2021-08-05 NOTE — TELEPHONE ENCOUNTER
Patient called in states he had telemed  visit today with Dr Deborah Hanna in system for review) tested positive for COVID so the physician set patient up with antibody therapy tonight at  1730  Patient would like to confirm treatment is okay from kidney standpoint  Call back#: [phone]  Thank you

## 2021-08-05 NOTE — TELEPHONE ENCOUNTER
Spoke with patient and with his wife      No contraindications to antibody therapy from renal stand point of view

## 2021-08-05 NOTE — PLAN OF CARE
Problem: PAIN - ADULT  Goal: Verbalizes/displays adequate comfort level or baseline comfort level  Description: Interventions:  - Encourage patient to monitor pain and request assistance  - Assess pain using appropriate pain scale  - Administer analgesics based on type and severity of pain and evaluate response  - Implement non-pharmacological measures as appropriate and evaluate response  - Consider cultural and social influences on pain and pain management  - Notify physician/advanced practitioner if interventions unsuccessful or patient reports new pain  Outcome: Progressing     Problem: Knowledge Deficit  Goal: Patient/family/caregiver demonstrates understanding of disease process, treatment plan, medications, and discharge instructions  Description: Complete learning assessment and assess knowledge base    Interventions:  - Provide teaching at level of understanding  - Provide teaching via preferred learning methods  Outcome: Progressing

## 2021-08-06 ENCOUNTER — TELEMEDICINE (OUTPATIENT)
Dept: INTERNAL MEDICINE CLINIC | Facility: CLINIC | Age: 51
End: 2021-08-06
Payer: COMMERCIAL

## 2021-08-06 VITALS — TEMPERATURE: 97.8 F

## 2021-08-06 DIAGNOSIS — U07.1 COVID-19: Primary | ICD-10-CM

## 2021-08-06 PROCEDURE — 1036F TOBACCO NON-USER: CPT | Performed by: NURSE PRACTITIONER

## 2021-08-06 PROCEDURE — 99212 OFFICE O/P EST SF 10 MIN: CPT | Performed by: NURSE PRACTITIONER

## 2021-08-06 NOTE — PROGRESS NOTES
COVID-19 Outpatient Progress Note    Assessment/Plan:    Problem List Items Addressed This Visit     None      Visit Diagnoses     COVID-19    -  Primary         Disposition:     I recommended continued isolation until at least 24 hours have passed since recovery defined as resolution of fever without the use of fever-reducing medications AND improvement in COVID symptoms AND 10 days have passed since onset of symptoms (or 10 days have passed since date of first positive viral diagnostic test for asymptomatic patients)  I have spent 15 minutes directly with the patient  Greater than 50% of this time was spent in counseling/coordination of care regarding: prognosis, instructions for management and impressions  Verification of patient location:    Patient is located in the following state in which I hold an active license [place]    Encounter provider Vista Habermann, CRNP    Provider located at [address] [place] [phone]    Recent Visits  Date Type Provider Dept   08/05/21 [facility]   08/03/21 Office Visit Rodney Clifford MD [address] [facility] recent visits within past 7 days and meeting all other requirements  Today's Visits  Date Type Provider Dept   08/06/21 Telemedicine Vista Habermann, [place] today's visits and meeting all other requirements  Future Appointments  No visits were found meeting these conditions  Showing future appointments within next 150 days and meeting all other requirements     This virtual check-in was done via [employer] and patient was informed that this is a secure, HIPAA-compliant platform  He agrees to proceed  Patient agrees to participate in a virtual check in via telephone or video visit instead of presenting to the office to address urgent/immediate medical needs  Patient is aware this is a billable service      After connecting through [facility], the patient was identified by name and date of birth  Huber Harry was informed that this was a telemedicine visit and that the exam was being conducted confidentially over secure lines  My office door was closed  No one else was in the room  Huber Harry acknowledged consent and understanding of privacy and security of the telemedicine visit  I informed the patient that I have reviewed his record in Epic and presented the opportunity for him to ask any questions regarding the visit today  The patient agreed to participate  Subjective:   Huber Harry is a 46 y o  male who has been screened for COVID-19  Symptom change since last report: improving  Patient's symptoms include nasal congestion, nausea, myalgias and headache  Patient denies fever, chills, fatigue, malaise, rhinorrhea, sore throat, anosmia, loss of taste, cough, shortness of breath, chest tightness, abdominal pain, vomiting and diarrhea  Graciela De has been staying home and has isolated themselves in his home  He is taking care to not share personal items and is cleaning all surfaces that are touched often, like counters, tabletops, and doorknobs using household cleaning sprays or wipes  He is wearing a mask when he leaves his room       Date of symptom onset: 8/2/2021  Date of positive COVID-19 PCR: 8/3/2021    Monoclonal Antibody Follow-up Symptom Questionnaire  I feel overall: somewhat better  My breathing is: somewhat better  My fever is: better  My fatigue is: somewhat better    Lab Results   Component Value Date    SARSCOV2 Positive (A) 08/03/2021     Past Medical History:   Diagnosis Date    DVT (deep venous thrombosis) ([address] )     Dyslipidemia     Hemorrhoids     [place] (hard of hearing)     Hypertension     Membranous glomerulonephritis     Proteinuria     Pulmonary embolus ([address] )     Renal vein thrombosis ([facility] )      Past Surgical History:   Procedure Laterality Date    ADENOIDECTOMY      HEMORRHOID SURGERY      VASECTOMY       Current Outpatient Medications   Medication Sig Dispense Refill    lisinopril (ZESTRIL) 20 mg tablet Take 20 mg by mouth daily       lisinopril (ZESTRIL) 20 mg tablet TAKE 1 TABLET DAILY (Patient not taking: Reported on 7/22/2021) 90 tablet 3     No current facility-administered medications for this visit  Facility-Administered Medications Ordered in Other Visits   Medication Dose Route Frequency Provider Last Rate Last Admin    acetaminophen (TYLENOL) tablet 650 mg  650 mg Oral Once PRN Theron Newton MD        albuterol (PROVENTIL HFA,VENTOLIN HFA) inhaler 3 puff  3 puff Inhalation Once PRN Theron Newton MD        ondansetron [facility] injection 4 mg  4 mg Intravenous Once PRN Theron Newton MD         Allergies   Allergen Reactions    Molds & Smuts        Review of Systems   Constitutional: Negative for chills, fatigue and fever  HENT: Positive for congestion  Negative for rhinorrhea and sore throat  Respiratory: Negative for cough, chest tightness and shortness of breath  Gastrointestinal: Positive for nausea  Negative for abdominal pain, diarrhea and vomiting  Musculoskeletal: Positive for myalgias  Neurological: Positive for headaches  Objective:    Vitals:    08/06/21 1108   Temp: 97 8 °F (36 6 °C)   TempSrc: Temporal       Physical Exam  Vitals reviewed  Constitutional:       Appearance: Normal appearance  Neurological:      Mental Status: He is alert  VIRTUAL VISIT DISCLAIMER    Charito Mathur verbally agrees to participate in [employer]  Pt is aware that [employer] could be limited without vital signs or the ability to perform a full hands-on physical exam  Jung A Kim Ill understands he or the provider may request at any time to terminate the video visit and request the patient to seek care or treatment in person

## 2021-09-22 ENCOUNTER — HOSPITAL ENCOUNTER (OUTPATIENT)
Dept: SLEEP CENTER | Facility: CLINIC | Age: 51
Discharge: HOME | End: 2021-09-22
Payer: COMMERCIAL

## 2021-09-22 DIAGNOSIS — R06.83 SNORING: ICD-10-CM

## 2021-09-22 DIAGNOSIS — I10 ESSENTIAL HYPERTENSION: ICD-10-CM

## 2021-09-22 DIAGNOSIS — E66.9 OBESITY, CLASS II, BMI 35-39.9: ICD-10-CM

## 2021-09-22 DIAGNOSIS — Z91.89 AT RISK FOR SLEEP APNEA: ICD-10-CM

## 2021-09-22 PROCEDURE — G0399 HOME SLEEP TEST/TYPE 3 PORTA: HCPCS

## 2021-09-23 NOTE — PROGRESS NOTES
Home Sleep Study Documentation    Pre-Sleep Home Study:    Set-up and instructions performed by:SOLA    Technician performed demonstration for Patient: yes    Return demonstration performed by Patient: yes    Written instructions provided to Patient: yes    Patient signed consent form: yes        Post-Sleep Home Study:    Additional comments by Patient: None     Home Sleep Study Failed:no:    Failure reason: N/A    Reported or Detected: N/A    Scored by: LEON Lim No complaints No complaints No complaints No complaints No complaints No complaints No complaints No complaints No complaints No complaints No complaints No complaints No complaints No complaints No complaints No complaints No complaints No complaints No complaints No complaints No complaints No complaints No complaints No complaints No complaints

## 2021-09-27 DIAGNOSIS — Z91.89 AT RISK FOR SLEEP APNEA: ICD-10-CM

## 2021-09-27 DIAGNOSIS — R06.83 SNORING: Primary | ICD-10-CM

## 2021-09-27 DIAGNOSIS — I10 ESSENTIAL HYPERTENSION: ICD-10-CM

## 2021-09-27 DIAGNOSIS — E66.9 OBESITY, CLASS II, BMI 35-39.9: ICD-10-CM

## 2021-09-27 PROCEDURE — 95806 SLEEP STUDY UNATT&RESP EFFT: CPT | Performed by: PSYCHIATRY & NEUROLOGY

## 2021-09-29 ENCOUNTER — TELEPHONE (OUTPATIENT)
Dept: SLEEP CENTER | Facility: CLINIC | Age: 51
End: 2021-09-29

## 2021-09-29 NOTE — TELEPHONE ENCOUNTER
----- Message from Tia Dillon MD sent at 9/27/2021  1:41 PM EDT -----  HST read  Non-diagnostic  PSG ordered

## 2021-09-29 NOTE — TELEPHONE ENCOUNTER
Left message for patient to call office    Sleep study was non diagnostic, due to home study underestimating degree of sleep apnea and symptoms, recommend in lab diagnostic study

## 2021-10-15 NOTE — TELEPHONE ENCOUNTER
Called patient and advised of sleep study results  Scheduled in lab diagnostic study for 12/26/21 in [facility]  Instructions provided  Verbalized understanding

## 2021-12-26 ENCOUNTER — HOSPITAL ENCOUNTER (OUTPATIENT)
Dept: SLEEP CENTER | Facility: CLINIC | Age: 51
Discharge: HOME | End: 2021-12-26
Payer: COMMERCIAL

## 2021-12-26 DIAGNOSIS — R06.83 SNORING: ICD-10-CM

## 2021-12-26 DIAGNOSIS — I10 ESSENTIAL HYPERTENSION: ICD-10-CM

## 2021-12-26 DIAGNOSIS — E66.9 OBESITY, CLASS II, BMI 35-39.9: ICD-10-CM

## 2021-12-26 DIAGNOSIS — Z91.89 AT RISK FOR SLEEP APNEA: ICD-10-CM

## 2021-12-26 PROCEDURE — 95810 POLYSOM 6/> YRS 4/> PARAM: CPT | Performed by: INTERNAL MEDICINE

## 2021-12-26 PROCEDURE — 95810 POLYSOM 6/> YRS 4/> PARAM: CPT

## 2022-01-03 ENCOUNTER — TELEPHONE (OUTPATIENT)
Dept: SLEEP CENTER | Facility: CLINIC | Age: 52
End: 2022-01-03

## 2022-01-03 NOTE — TELEPHONE ENCOUNTER
----- Message from Adrian Santos MD sent at 1/1/2022  1:09 PM EST -----   Please schedule for sleep clinic   Thanks

## 2022-01-03 NOTE — TELEPHONE ENCOUNTER
Patient seen by Dr Jansen Figures   7/8/2021  Sleep study shows mild SERA   Needs to have follow up scheduled

## 2022-01-10 NOTE — TELEPHONE ENCOUNTER
Called patient and reviewed sleep study results  Scheduled follow up with Dr Abdifatah Post in [place] 5/9/22  Added to wait list   Patient prefers late afternoon appointment  She is aware of her results

## 2022-02-14 ENCOUNTER — TELEPHONE (OUTPATIENT)
Dept: SLEEP CENTER | Facility: CLINIC | Age: 52
End: 2022-02-14

## 2022-02-14 ENCOUNTER — TELEMEDICINE (OUTPATIENT)
Dept: SLEEP CENTER | Facility: CLINIC | Age: 52
End: 2022-02-14
Payer: COMMERCIAL

## 2022-02-14 DIAGNOSIS — I27.82 CHRONIC PULMONARY EMBOLISM, UNSPECIFIED PULMONARY EMBOLISM TYPE, UNSPECIFIED WHETHER ACUTE COR PULMONALE PRESENT (HCC): ICD-10-CM

## 2022-02-14 DIAGNOSIS — N18.31 STAGE 3A CHRONIC KIDNEY DISEASE (HCC): ICD-10-CM

## 2022-02-14 DIAGNOSIS — G47.33 OSA (OBSTRUCTIVE SLEEP APNEA): Primary | ICD-10-CM

## 2022-02-14 DIAGNOSIS — I10 ESSENTIAL HYPERTENSION: ICD-10-CM

## 2022-02-14 PROCEDURE — 1036F TOBACCO NON-USER: CPT | Performed by: PSYCHIATRY & NEUROLOGY

## 2022-02-14 PROCEDURE — 99214 OFFICE O/P EST MOD 30 MIN: CPT | Performed by: PSYCHIATRY & NEUROLOGY

## 2022-02-14 NOTE — PROGRESS NOTES
Review of Systems      Genitourinary need to urinate more than twice a night   Cardiology none   Gastrointestinal none   Neurology none   Constitutional none   Integumentary none   Psychiatry none   Musculoskeletal none   Pulmonary snoring   ENT ringing in ears   Endocrine none   Hematological none

## 2022-02-14 NOTE — PROGRESS NOTES
Virtual Regular Visit    Verification of patient location:    Patient is located in the following state in which I hold an active license [place]      Assessment/Plan:    Problem List Items Addressed This Visit        Respiratory    SERA (obstructive sleep apnea) - Primary       Cardiovascular and Mediastinum    Chronic pulmonary embolism (HCC)    Essential hypertension       Genitourinary    Chronic kidney disease (CKD), stage III (moderate) ([facility] )               Reason for visit is   Chief Complaint   Patient presents with    Virtual Regular Visit        Encounter provider Lemuel العراقي MD    Provider located at [address]  [address] [facility],Third Floor [place] [id number] [phone]      Recent Visits  No visits were found meeting these conditions  Showing recent visits within past 7 days and meeting all other requirements  Today's Visits  Date Type Provider Dept   02/14/22 Telemedicine Lemuel العراقي MD [facility] [place]   Showing today's visits and meeting all other requirements  Future Appointments  No visits were found meeting these conditions  Showing future appointments within next 150 days and meeting all other requirements       The patient was identified by name and date of birth  Indu Baum was informed that this is a telemedicine visit and that the visit is being conducted through [address] [place] [address] Now and patient was informed that this is a secure, HIPAA-compliant platform  He agrees to proceed  My office door was closed  No one else was in the room  He acknowledged consent and understanding of privacy and security of the video platform  The patient has agreed to participate and understands they can discontinue the visit at any time  Patient is aware this is a billable service  Sleep Medicine Follow-Up Note    HPI: 52yo M with SERA being seen for a follow up         Treatment Summary: HST 9/2021: respiratory event index (MARY) of 2 6   The lowest SpO2 recorded is 87%  PSG 12/2021: apnea/hypopnea index (AHI) of 11 3 events per hour of sleep   The AHI in the supine position was 51 4   The AHI during REM sleep was 12 5  HPI:   Today, patient stated that his sleep is the same and no change in his symptoms  Respiratory:  -Ongoing Snoring: yes  -Mouth Breathing: sometimes  -Dry Mouth: sometimes  -Nocturnal Gasping: no    ROS:     Review of Systems      Genitourinary need to urinate more than twice a night   Cardiology none   Gastrointestinal none   Neurology none   Constitutional none   Integumentary none   Psychiatry none   Musculoskeletal none   Pulmonary snoring   ENT Nasal congestion  Ringing in the ears   Endocrine none   Hematological none           Sleep Pattern:  -Position: side and back  -Bedtime: 10pm  -Awakenings: 2-3 to void  -Wake Time: 6am    Daytime Symptoms:  -Upon Awakening: tired sometimes  -Daytime fatigue/sleepiness: denied  -Naps: no  -Involuntary Dozing: sometimes at night  -Cognitive Symptoms: denied  -Driving: Difficulty with sleepiness and driving:  denied   -- Close calls related to sleepiness: denied   -- Accidents related to sleepiness: denied    Substance Use:  -Caffeine: iced tea at times  -Alcohol: no  -THC: no    --> Denies any significant medical changes since last visit  --> Supplemental Oxygen Use: denies    Questionnaire:  Sitting and reading: High chance of dozing  Watching TV: Slight chance of dozing  Sitting, inactive in a public place (e g  a theatre or a meeting): Would never doze  As a passenger in a car for an hour without a break: Slight chance of dozing  Lying down to rest in the afternoon when circumstances permit: Would never doze  Sitting and talking to someone: Would never doze  Sitting quietly after a lunch without alcohol: Would never doze  In a car, while stopped for a few minutes in traffic: Would never doze  Total score: 5      PE:    There were no vitals taken for this visit      General:  In NAD   Pul: Respirations: unlaboured  MS: No atrophy  Neuro: No resting tremor  Gait normal turning & station; unremarkable overall  Psych: Socially appropriate  Pleasant  No overt dysphoria  Assessment: The patient continues to be symptomatica and his results were reviewed  Treatment options reviewed including CPAP, OAT and inspire  Willing to start CPAP  Recommendations:    1) APAP at 5-15cm with NM  2) Safe driving reviewed  3) Follow-up 2-3 months  4) Call with any questions or concerns  All questions answered for the patient, who indicated understanding and agreed with the plan  Sigrid Chávez MD  Psychiatry/ Sleep medicine        I spent 20 minutes with patient today in which greater than 50% of the time was spent in counseling/coordination of care regarding treatment    [address] verbally agrees to participate in [employer]  Pt is aware that [employer] could be limited without vital signs or the ability to perform a full hands-on physical exam  Jung JULIO CÉSAR Harris understands he or the provider may request at any time to terminate the video visit and request the patient to seek care or treatment in person

## 2022-02-14 NOTE — TELEPHONE ENCOUNTER
Patient would like to get set up at [facility]  For pap machine   Advised patient to call us once he gets set up so that we can make sure follow up is within compliance window

## 2022-02-14 NOTE — PATIENT INSTRUCTIONS
ecommendations:    1) APAP at 5-15cm with NM  2) Safe driving reviewed  3) Follow-up 2-3 months  4) Call with any questions or concerns

## 2022-03-18 ENCOUNTER — RA CDI HCC (OUTPATIENT)
Dept: OTHER | Facility: HOSPITAL | Age: 52
End: 2022-03-18

## 2022-03-18 NOTE — PROGRESS NOTES
Please review if the following dx  is applicable to the patient's condition and assess and document, if applicable in next visit on 03/23/2022     E66 01: Morbid (severe) obesity due to excess calories ([facility] ) -      Per CMS/ICD 10 coding guidelines, to be used when BMI > 35 & <40 with one or more comorbidity (DM, HTN, or SERA)    ---------------------------------------------------------------------------------------  The following dx found on active problem list - please assess using MEAT for 2022 billing    Mild depression ([facility] ) [F32 0]    [facility]  coding opportunities          Chart Reviewed number of suggestions sent to Provider: 2     Patients Insurance        Commercial Insurance: [employer]

## 2022-04-14 ENCOUNTER — APPOINTMENT (EMERGENCY)
Dept: RADIOLOGY | Facility: HOSPITAL | Age: 52
End: 2022-04-14
Payer: COMMERCIAL

## 2022-04-14 ENCOUNTER — HOSPITAL ENCOUNTER (EMERGENCY)
Facility: HOSPITAL | Age: 52
Discharge: HOME | End: 2022-04-14
Attending: EMERGENCY MEDICINE
Payer: COMMERCIAL

## 2022-04-14 VITALS
HEART RATE: 72 BPM | RESPIRATION RATE: 16 BRPM | DIASTOLIC BLOOD PRESSURE: 89 MMHG | TEMPERATURE: 97.9 F | OXYGEN SATURATION: 99 % | SYSTOLIC BLOOD PRESSURE: 154 MMHG

## 2022-04-14 DIAGNOSIS — M79.674 GREAT TOE PAIN, RIGHT: Primary | ICD-10-CM

## 2022-04-14 PROCEDURE — 99284 EMERGENCY DEPT VISIT MOD MDM: CPT | Performed by: EMERGENCY MEDICINE

## 2022-04-14 PROCEDURE — 73660 X-RAY EXAM OF TOE(S): CPT

## 2022-04-14 PROCEDURE — 99283 EMERGENCY DEPT VISIT LOW MDM: CPT

## 2022-04-14 NOTE — ED PROVIDER NOTES
History  Chief Complaint   Patient presents with    Foot Pain     pt c/o R foot pain with redness and warmth since yesterday, concerned for a blood clot  pt has hx of blood clots     HPI     45 y/o male with prior history of renal vein thrombosis and PE, no longer on anticoagulation, presenting for evaluation of the pain to the bottom of his foot at the base of his right great toe  He comes in for evaluation as his wife is concerned for the possibility of a blood clot  No calf swelling or tenderness  No swelling to the foot or erythema over the area of tenderness  He is able to ambulate but with some discomfort  He denies new physical activity or new foot wear  Prior to Admission Medications   Prescriptions Last Dose Informant Patient Reported? Taking?   lisinopril (ZESTRIL) 20 mg tablet 4/13/2022 at Unknown time Self Yes Yes   Sig: Take 20 mg by mouth daily    lisinopril (ZESTRIL) 20 mg tablet   No No   Sig: TAKE 1 TABLET DAILY   Patient not taking: Reported on 7/22/2021      Facility-Administered Medications: None       Past Medical History:   Diagnosis Date    DVT (deep venous thrombosis) (HCC)     Dyslipidemia     Hemorrhoids     [place] (hard of hearing)     Hypertension     Membranous glomerulonephritis     Proteinuria     Pulmonary embolus (HCC)     Renal vein thrombosis (HCC)        Past Surgical History:   Procedure Laterality Date    ADENOIDECTOMY      HEMORRHOID SURGERY      VASECTOMY         Family History   Problem Relation Age of Onset    Colon polyps Mother     Heart disease Father     Hypertension Father     Coronary artery disease Father     Nephrolithiasis Father     No Known Problems Sister     No Known Problems Brother     Diabetes Paternal Grandfather     Stroke Neg Hx     Thyroid disease Neg Hx      I have reviewed and agree with the history as documented      E-Cigarette/Vaping    E-Cigarette Use Never User      E-Cigarette/Vaping Substances     Social History     Tobacco Use    Smoking status: Never Smoker    Smokeless tobacco: Never Used   Vaping Use    Vaping Use: Never used   Substance Use Topics    Alcohol use: Yes     Comment: socialy    Drug use: No       Review of Systems   Constitutional: Negative for chills and fever  Respiratory: Negative for shortness of breath  Cardiovascular: Negative for chest pain  Gastrointestinal: Negative for abdominal pain, nausea and vomiting  Musculoskeletal: Negative for arthralgias, back pain, joint swelling and neck pain  Pain to the bottom of the R foot   Skin: Negative for wound  Neurological: Negative for weakness and numbness  Physical Exam  Physical Exam  Vitals reviewed  Constitutional:       General: He is not in acute distress  Appearance: Normal appearance  He is not ill-appearing or toxic-appearing  HENT:      Head: Normocephalic and atraumatic  Right Ear: External ear normal       Left Ear: External ear normal       Nose: Nose normal    Eyes:      Conjunctiva/sclera: Conjunctivae normal    Cardiovascular:      Rate and Rhythm: Normal rate  Pulses: Normal pulses  Pulmonary:      Effort: Pulmonary effort is normal  No respiratory distress  Abdominal:      General: There is no distension  Musculoskeletal:         General: No deformity  Normal range of motion  Cervical back: Normal range of motion  Comments: Tenderness to palpation to the bottom of the right foot at the base of the right great toe  No swelling, erythema, or warmth compared to the other foot  Full range of motion of the right 1st IP joint without pain  No tenderness to palpation over the plantar fascia  No bony tenderness to palpation over the remainder of the foot  Full range of motion of the toes of the right foot and the right ankle without pain or swelling  No calf swelling or tenderness  Skin:     General: Skin is warm and dry     Neurological:      General: No focal deficit present  Mental Status: He is alert and oriented to person, place, and time  Comments: Intact sensation to light touch to the R great toe   Psychiatric:         Mood and Affect: Mood normal          Vital Signs  ED Triage Vitals [04/14/22 0942]   Temperature Pulse Respirations Blood Pressure SpO2   97 9 °F (36 6 °C) 78 20 154/89 96 %      Temp Source Heart Rate Source Patient Position - Orthostatic VS BP Location FiO2 (%)   Oral Monitor Sitting Right arm --      Pain Score       --           Vitals:    04/14/22 0942 04/14/22 1000 04/14/22 1100   BP: 154/89     Pulse: 78 76 72   Patient Position - Orthostatic VS: Sitting           Visual Acuity      ED Medications  Medications - No data to display    Diagnostic Studies  Results Reviewed     None                 XR toe great min 2 views RIGHT   ED Interpretation by Anni Nelson MD (04/14 1121)   No acute fracture or malalignment  Final Result by Dieter Lemons MD (04/14 1435)      No acute osseous abnormality  Workstation performed: [id number]                    Procedures  Procedures         ED Course                                             MDM  Number of Diagnoses or Management Options  Great toe pain, right: new and requires workup  Diagnosis management comments:   XR of the R great toe with no acute fracture or malalignment  No evidence of septic joint or gout  Patient has no leg swelling, no calf tenderness  Patient and his wife were reassured that his exam is not consistent with a blood clot  Patient was encouraged to wear supportive footwear, ice the area of pain, and elevate his foot when seated  Phone number provided with Podiatry for follow-up if symptoms persist   Return precautions discussed         Amount and/or Complexity of Data Reviewed  Tests in the radiology section of CPT®: ordered and reviewed  Independent visualization of images, tracings, or specimens: yes    Patient Progress  Patient progress: stable       Disposition  Final diagnoses:   Great toe pain, right     Time reflects when diagnosis was documented in both MDM as applicable and the Disposition within this note     Time User Action Codes Description Comment    4/14/2022 11:21 AM Bibi Charles Add [T59 038] Great toe pain, right       ED Disposition     ED Disposition Condition Date/Time Comment    Discharge Stable Thu Apr 14, 2022 11:21 AM Edna Meng discharge to home/self care  Follow-up Information     Follow up With Specialties Details Why Contact Info Additional Magali 6 In 1 week If pain persists  [address] [facility] [id number]  [address] [facility] [place], [address] [place] [address], [address] [place] [address] Emergency Department Emergency Medicine  As we discussed, return to the Emergency Department for redness and swelling to your toe with inability to move it, difficulty walking, fever, or for new or concerning symptoms  [address] [facility] [id number] [place] [address] Emergency Department, [address], [place], [facility], [address]          Discharge Medication List as of 4/14/2022 11:22 AM      CONTINUE these medications which have NOT CHANGED    Details   !! lisinopril (ZESTRIL) 20 mg tablet Take 20 mg by mouth daily , Historical Med      !! lisinopril (ZESTRIL) 20 mg tablet TAKE 1 TABLET DAILY, Normal       !! - Potential duplicate medications found  Please discuss with provider  No discharge procedures on file      PDMP Review     None          ED Provider  Electronically Signed by           Hayden Segura MD  04/14/22 1686

## 2022-04-15 ENCOUNTER — OFFICE VISIT (OUTPATIENT)
Dept: INTERNAL MEDICINE CLINIC | Facility: CLINIC | Age: 52
End: 2022-04-15
Payer: COMMERCIAL

## 2022-04-15 VITALS
DIASTOLIC BLOOD PRESSURE: 68 MMHG | HEIGHT: 69 IN | HEART RATE: 75 BPM | BODY MASS INDEX: 35.84 KG/M2 | SYSTOLIC BLOOD PRESSURE: 118 MMHG | OXYGEN SATURATION: 99 % | WEIGHT: 242 LBS

## 2022-04-15 DIAGNOSIS — M10.371 ACUTE GOUT DUE TO RENAL IMPAIRMENT INVOLVING TOE OF RIGHT FOOT: Primary | ICD-10-CM

## 2022-04-15 PROCEDURE — 1036F TOBACCO NON-USER: CPT | Performed by: NURSE PRACTITIONER

## 2022-04-15 PROCEDURE — 99213 OFFICE O/P EST LOW 20 MIN: CPT | Performed by: NURSE PRACTITIONER

## 2022-04-15 RX ORDER — PREDNISONE 20 MG/1
40 TABLET ORAL DAILY
Qty: 10 TABLET | Refills: 0 | Status: SHIPPED | OUTPATIENT
Start: 2022-04-15 | End: 2022-04-20

## 2022-04-15 NOTE — PATIENT INSTRUCTIONS
Low Purine Diet   WHAT YOU NEED TO KNOW:   What is a low-purine diet? A low-purine diet is a meal plan based on foods that are low in purine content  Purine is a substance that is found in foods and is produced naturally by the body  Purines are broken down by the body and changed to uric acid  The kidneys normally filter the uric acid, and it leaves the body through the urine  However, people with gout sometimes have a buildup of uric acid in the blood  This buildup of uric acid can cause swelling and pain (a gout attack)  A low-purine diet may help to treat and prevent gout attacks  What foods can I include? The following foods are low in purine  · Eggs, nuts, and peanut butter    · Low-fat and fat free cheese and ice cream    · Skim or 1% milk    · Soup made without meat extract or broth    · Vegetables that are not on the medium-purine list below    · All fruit and fruit juices    · Bread, pasta, rice, cake, cornbread, and popcorn    · Water, soda, tea, coffee, and cocoa    · Sugar, sweets, and gelatin    · Fat and oil    What foods should I limit? · Medium-purine foods:      ? Meats:  Limit the following to 4 to 6 ounces each day  § Meat and [employer], lobster, oysters, and shrimp    ? Vegetables:  Limit the following vegetables to ½ cup each day  § Asparagus    § Cauliflower    § Spinach    § Mushrooms    § Green peas    ? Beans, peas, and lentils (limit to 1 cup each day)    ? Oats and oatmeal (limit to ? cup uncooked each day)    ? Wheat germ and bran (limit to ¼ cup each day)    · High-purine foods:  Limit or avoid foods high in purine  ? Anchovies, sardines, scallops, and mussels    ? Tuna, codfish, herring, and lamar    ? [employer], like goose and duck    ? Organ meats, such as brains, heart, kidney, liver, and sweetbreads    ? Gravies and sauces made with meat    ? Yeast extracts taken in the form of a supplement    What other guidelines should I follow?    · Increase liquid intake  Drink 8 to 16 (eight-ounce) cups of liquid each day  At least half of the liquid you drink should be water  Liquid can help your body get rid of extra uric acid  · Limit or avoid alcohol  Alcohol (especially beer) increases your risk of a gout attack  Beer contains a high amount of purine  · Maintain a healthy weight  If you are overweight, you should lose weight slowly  Weight loss can help decrease the amount of stress on your joints  Regular exercise can help you lose weight if you are overweight, or maintain your weight if you are at a normal weight  Talk to your healthcare provider before you begin an exercise program     CARE AGREEMENT:   You have the right to help plan your care  Discuss treatment options with your healthcare provider to decide what care you want to receive  You always have the right to refuse treatment  The above information is an  only  It is not intended as medical advice for individual conditions or treatments  Talk to your doctor, nurse or pharmacist before following any medical regimen to see if it is safe and effective for you  © Copyright [address] 2022 Information is for End User's use only and may not be sold, redistributed or otherwise used for commercial purposes   All illustrations and images included in CareNotes® are the copyrighted property of [employer]  or [address] [place] [address]

## 2022-04-16 PROBLEM — M10.371 ACUTE GOUT DUE TO RENAL IMPAIRMENT INVOLVING TOE OF RIGHT FOOT: Status: ACTIVE | Noted: 2022-04-16

## 2022-04-17 NOTE — PROGRESS NOTES
Assessment/Plan:    Acute gout due to renal impairment involving toe of right foot  Start prednisone for gout  Educated patient on a low purine diet       Diagnoses and all orders for this visit:    Acute gout due to renal impairment involving toe of right foot  -     predniSONE 20 mg tablet; Take 2 tablets (40 mg total) by mouth daily for 5 days          Subjective:      Patient ID: Lissa Abdi is a 46 y o  male  Patient is here for a ER follow up for yesterday  Co pain and swelling of right base of big toe  Difficult to walk and bear weight  He has never had gout before        The following portions of the patient's history were reviewed and updated as appropriate: allergies, current medications, past family history, past medical history, past social history, past surgical history and problem list     Review of Systems   Constitutional: Negative  HENT: Negative  Eyes: Negative  Respiratory: Negative  Cardiovascular: Negative  Gastrointestinal: Negative  Musculoskeletal: Positive for arthralgias and joint swelling  Neurological: Negative            Objective:      /68   Pulse 75   Ht 5' 9" (1 753 m)   Wt 110 kg (242 lb)   SpO2 99%   BMI 35 74 kg/m²          Physical Exam  Musculoskeletal:        Feet:

## 2022-04-18 ENCOUNTER — TELEPHONE (OUTPATIENT)
Dept: INTERNAL MEDICINE CLINIC | Facility: CLINIC | Age: 52
End: 2022-04-18

## 2022-04-18 DIAGNOSIS — M10.371 ACUTE GOUT DUE TO RENAL IMPAIRMENT INVOLVING TOE OF RIGHT FOOT: Primary | ICD-10-CM

## 2022-04-18 DIAGNOSIS — M79.674 GREAT TOE PAIN, RIGHT: ICD-10-CM

## 2022-04-18 RX ORDER — COLCHICINE 0.6 MG/1
TABLET ORAL
Qty: 3 TABLET | Refills: 0 | Status: SHIPPED | OUTPATIENT
Start: 2022-04-18 | End: 2022-05-11

## 2022-04-18 NOTE — TELEPHONE ENCOUNTER
Advised pt, wants to know if this is safe for his kidney's and if he should take the last day of prednisone tomorrow?

## 2022-04-18 NOTE — TELEPHONE ENCOUNTER
Pt's wife calling in stating pt was in to see us 4/15 for gout - tomorrow is his last day of the prednisone however he has not seen any improvement since starting    pt's wife asking what his next step should be?

## 2022-04-19 NOTE — TELEPHONE ENCOUNTER
Patient called regarding Gout pain, let patient know that there is a script for Ortho in his chart, patient will call today

## 2022-04-22 ENCOUNTER — OFFICE VISIT (OUTPATIENT)
Dept: PODIATRY | Facility: CLINIC | Age: 52
End: 2022-04-22
Payer: COMMERCIAL

## 2022-04-22 VITALS
WEIGHT: 242 LBS | BODY MASS INDEX: 35.84 KG/M2 | SYSTOLIC BLOOD PRESSURE: 148 MMHG | HEIGHT: 69 IN | DIASTOLIC BLOOD PRESSURE: 86 MMHG | OXYGEN SATURATION: 98 % | HEART RATE: 78 BPM

## 2022-04-22 DIAGNOSIS — M79.671 RIGHT FOOT PAIN: ICD-10-CM

## 2022-04-22 DIAGNOSIS — M20.21 HALLUX RIGIDUS OF RIGHT FOOT: Primary | ICD-10-CM

## 2022-04-22 PROCEDURE — 20600 DRAIN/INJ JOINT/BURSA W/O US: CPT | Performed by: STUDENT IN AN ORGANIZED HEALTH CARE EDUCATION/TRAINING PROGRAM

## 2022-04-22 PROCEDURE — 99203 OFFICE O/P NEW LOW 30 MIN: CPT | Performed by: STUDENT IN AN ORGANIZED HEALTH CARE EDUCATION/TRAINING PROGRAM

## 2022-04-22 PROCEDURE — 3008F BODY MASS INDEX DOCD: CPT | Performed by: NURSE PRACTITIONER

## 2022-04-22 RX ORDER — DEXAMETHASONE SODIUM PHOSPHATE 4 MG/ML
4 INJECTION, SOLUTION INTRA-ARTICULAR; INTRALESIONAL; INTRAMUSCULAR; INTRAVENOUS; SOFT TISSUE ONCE
Status: COMPLETED | OUTPATIENT
Start: 2022-04-22 | End: 2022-04-22

## 2022-04-22 RX ORDER — BUPIVACAINE HYDROCHLORIDE 2.5 MG/ML
0.5 INJECTION, SOLUTION EPIDURAL; INFILTRATION; INTRACAUDAL ONCE
Status: COMPLETED | OUTPATIENT
Start: 2022-04-22 | End: 2022-04-22

## 2022-04-22 RX ADMIN — DEXAMETHASONE SODIUM PHOSPHATE 4 MG: 4 INJECTION, SOLUTION INTRA-ARTICULAR; INTRALESIONAL; INTRAMUSCULAR; INTRAVENOUS; SOFT TISSUE at 12:21

## 2022-04-22 RX ADMIN — BUPIVACAINE HYDROCHLORIDE 0.5 ML: 2.5 INJECTION, SOLUTION EPIDURAL; INFILTRATION; INTRACAUDAL at 12:21

## 2022-04-22 NOTE — PROGRESS NOTES
Assessment/Plan:    No problem-specific Assessment & Plan notes found for this encounter  Diagnoses and all orders for this visit:    Hallux rigidus of right foot  -     bupivacaine (PF) (MARCAINE) 0 25 % injection 0 5 mL  -     dexamethasone (DECADRON) injection 4 mg    Right foot pain  -     bupivacaine (PF) (MARCAINE) 0 25 % injection 0 5 mL  -     dexamethasone (DECADRON) injection 4 mg      Plan:     - diagnosis treatments discussed with patient  - Right foot x-rays reviewed:  I personally interpreted the x-ray findings with patient and wife, 1st MTPJ with decreased joint space consistent with arthritis  No other osseous abnormalities noted  - discussed conservative management for hallux rigidus, supportive shoes, carbon fiber insert, custom-made orthotics with modification, Voltaren gel, steroid joint injections  I informed patient arthritis is a progressive deformity will likely worse with time however decrease it continue range-of-motion exercises to 1st MTPJ  Patient agrees with the treatment above  - Right 1st MTPJ injection was given after obtaining verbal consent  A formal timeout including patient identification, laterality and existing allergies using [facility] protocol was conducted  I injected (after sterile prep of the skin) a mixture of 0 25% marcaine and Dex 4mg  The patient tolerated it well  - Return in 3 weeks   - All questions and concerns were addressed, call if any questions  Subjective:      Patient ID: Christine Vaz is a 46 y o  male  Hartley Jabari a 46years old male with complaint of big toe joint pain on the right  Patient was seen at urgent care where x-rays were obtained which were negative for any osseous abnormalities  Patient follow-up with who diagnosed him with gout and was prescribed oral prednisone and colchicine that helped somewhat however pain is persistent  Reports most of his pain is medial plantar 1st metatarsal   Reports aching type of pain 6/10   PMH as below  The following portions of the patient's history were reviewed and updated as appropriate:   He  has a past medical history of DVT (deep venous thrombosis) ([facility] ), Dyslipidemia, Hemorrhoids, [place] (hard of hearing), Hypertension, Membranous glomerulonephritis, Proteinuria, Pulmonary embolus ([facility] ), and Renal vein thrombosis ([facility] )  He   Patient Active Problem List    Diagnosis Date Noted    Acute gout due to renal impairment involving toe of right foot 04/16/2022    Snoring     SERA (obstructive sleep apnea)     COVID-19 virus infection 08/05/2021    Flu-like symptoms 08/03/2021    At risk for sleep apnea 04/19/2021    Obesity, Class II, BMI 35-39 9 03/17/2021    Mild depression 03/17/2021    Screening PSA (prostate specific antigen) 07/21/2020    Screening for malignant neoplasm of colon 07/21/2020    Screening for diabetes mellitus 07/21/2020    Decreased hearing of both ears 04/09/2019    Wellness examination 09/27/2018    Chronic kidney disease (CKD), stage III (moderate) ([facility] ) 04/11/2018    Membranous nephropathy determined by biopsy 04/11/2018    Essential hypertension 04/11/2018    Chronic pulmonary embolism ([facility] ) 02/21/2017     He  has a past surgical history that includes ADENOIDECTOMY; Hemorrhoid surgery; and Vasectomy       Review of Systems   Constitutional: Negative for chills and fever  Respiratory: Negative for apnea  Gastrointestinal: Negative for diarrhea and nausea  Musculoskeletal: Positive for arthralgias  Skin: Positive for color change  Neurological: Negative for dizziness  Psychiatric/Behavioral: Negative for agitation  Objective:      /86 (BP Location: Left arm, Patient Position: Sitting, Cuff Size: Adult)   Pulse 78   Ht 5' 9" (1 753 m)   Wt 110 kg (242 lb)   SpO2 98%   BMI 35 74 kg/m²          Physical Exam  Constitutional:       Appearance: He is obese  Cardiovascular:      Rate and Rhythm: Normal rate  Pulses: Normal pulses  Pulmonary:      Effort: Pulmonary effort is normal    Musculoskeletal:         General: Swelling and tenderness present  Right foot: Swelling, deformity, tenderness and crepitus present  Comments: Limited ROM to the right 1st MTPJ, pain with palpation to the medial and dorsal 1st MTPJ  No pain to sesamoid apparatus  Crepitus with ROM 1st MTPJ  Skin:     General: Skin is warm and dry  Capillary Refill: Capillary refill takes less than 2 seconds  Neurological:      General: No focal deficit present  Mental Status: He is alert  Psychiatric:         Mood and Affect: Mood normal              Right 1st MTPJ     Date/Time 4/22/2022 3:18 PM     Performed by  Edgardo Esteves DPM     Authorized by Edgardo Esteves DPM      Universal Protocol   Consent: Verbal consent obtained    Risks and benefits: risks, benefits and alternatives were discussed  Consent given by: patient  Patient understanding: patient states understanding of the procedure being performed  Patient identity confirmed: verbally with patient        Local anesthesia used: yes      Anesthesia: local infiltration     Anesthesia   Local anesthesia used: yes  Local Anesthetic: bupivacaine 0 25% without epinephrine (and 4mg Dex)     Procedure Details   Patient tolerance: patient tolerated the procedure well with no immediate complications

## 2022-04-22 NOTE — PATIENT INSTRUCTIONS
Voltaren Gel buy over the counter   Carbon Fiber inserts   New balance shoes       Elevate  your foot above the level of your heart as often as you can for the first 3 days  This will help decrease swelling and pain  Prop your foot on pillows or blankets to keep it elevated comfortably  Apply ice  on your toe for 15 to 20 minutes every hour or as directed  Use an ice pack, or put crushed ice in a plastic bag  Cover it with a towel  Ice helps prevent tissue damage and decreases swelling and pain  Wear your post-operative shoe as directed:  A healthcare provider will fit you with a post-operative shoe to wear  This shoe has a stiff sole and is usually worn for the first 2 weeks or as directed  Once your swelling has decreased, you may be able to wear regular shoes  Keep your bandage clean and dry:  Ask your healthcare provider about changing your bandage  You may need to keep your bandage on until your follow-up visit  Try to keep your foot out of the water as much as possible  Cover your foot with a plastic bag when you bathe  Exercise your toe gently as directed:  Hold your big toe and gently move it up and down  This helps decrease stiffness and improves movement  You may also sit in a chair with your foot flat on the floor  Then raise the heel up and down to exercise your toe joint  Ask your healthcare provider how often to exercise your toe joint  Ask when you can return to daily activities: You may be able to start walking on a limited basis after your surgery, as directed  You may be able to return to work in 2 weeks or less  This depends on the type of work you do  Do not drive for at least 2 weeks or as directed  Ask your healthcare provider when you can start to exercise or return to sports  Follow up with your healthcare provider as directed: You will need to return to have your stitches removed and your wound checked  An x-ray of your joint may also be done   Write down your questions so you remember to ask them during your visits  © Copyright [employer] 2022 Information is for End User's use only and may not be sold, redistributed or otherwise used for commercial purposes  All illustrations and images included in CareNotes® are the copyrighted property of A D A M , Inc  or Azeb Navarrete  The above information is an  only  It is not intended as medical advice for individual conditions or treatments  Talk to your doctor, nurse or pharmacist before following any medical regimen to see if it is safe and effective for you

## 2022-05-11 ENCOUNTER — OFFICE VISIT (OUTPATIENT)
Dept: INTERNAL MEDICINE CLINIC | Facility: CLINIC | Age: 52
End: 2022-05-11
Payer: COMMERCIAL

## 2022-05-11 VITALS
SYSTOLIC BLOOD PRESSURE: 132 MMHG | BODY MASS INDEX: 36.21 KG/M2 | HEIGHT: 69 IN | WEIGHT: 244.5 LBS | HEART RATE: 79 BPM | OXYGEN SATURATION: 99 % | DIASTOLIC BLOOD PRESSURE: 96 MMHG

## 2022-05-11 DIAGNOSIS — Z12.12 SCREENING FOR COLORECTAL CANCER: ICD-10-CM

## 2022-05-11 DIAGNOSIS — Z12.11 SCREENING FOR COLORECTAL CANCER: ICD-10-CM

## 2022-05-11 DIAGNOSIS — Z12.5 SCREENING PSA (PROSTATE SPECIFIC ANTIGEN): ICD-10-CM

## 2022-05-11 DIAGNOSIS — G47.33 OSA (OBSTRUCTIVE SLEEP APNEA): ICD-10-CM

## 2022-05-11 DIAGNOSIS — I10 ESSENTIAL HYPERTENSION: Primary | ICD-10-CM

## 2022-05-11 DIAGNOSIS — N18.31 STAGE 3A CHRONIC KIDNEY DISEASE (HCC): ICD-10-CM

## 2022-05-11 DIAGNOSIS — Z00.00 ANNUAL PHYSICAL EXAM: ICD-10-CM

## 2022-05-11 DIAGNOSIS — Z11.59 NEED FOR HEPATITIS C SCREENING TEST: ICD-10-CM

## 2022-05-11 DIAGNOSIS — Z23 ENCOUNTER FOR IMMUNIZATION: ICD-10-CM

## 2022-05-11 DIAGNOSIS — M79.674 CHRONIC TOE PAIN, RIGHT FOOT: ICD-10-CM

## 2022-05-11 DIAGNOSIS — G89.29 CHRONIC TOE PAIN, RIGHT FOOT: ICD-10-CM

## 2022-05-11 PROCEDURE — 99396 PREV VISIT EST AGE 40-64: CPT | Performed by: NURSE PRACTITIONER

## 2022-05-11 NOTE — PROGRESS NOTES
[facility]    NAME: Romario Bajwa  AGE: 46 y o  SEX: male  : 1970     DATE: 2022     Assessment and Plan:     Problem List Items Addressed This Visit        Respiratory    SERA (obstructive sleep apnea)     Continue using cpap              Cardiovascular and Mediastinum    Essential hypertension - Primary     Controlled  Counseled patient on proper diet that is low in sodium, exercise, and weight loss  No change in medication regimen             Relevant Orders    Lipid panel    Comprehensive metabolic panel    CBC and differential    HEMOGLOBIN A1C W/ EAG ESTIMATION       Genitourinary    Chronic kidney disease (CKD), stage III (moderate) (HCC)     Lab Results   Component Value Date    EGFR 63 2021    EGFR 65 2020    EGFR 65 2020    CREATININE 1 30 2021    CREATININE 1 28 2020    CREATININE 1 27 2020   bloodwork ordered              Other    Screening PSA (prostate specific antigen)    Relevant Orders    PSA, Total Screen      Other Visit Diagnoses     Need for hepatitis C screening test        Encounter for immunization        Chronic toe pain, right foot        Relevant Orders    Uric acid    Sedimentation rate, automated    Cyclic citrul peptide antibody, IgG    Annual physical exam        Screening for colorectal cancer              Immunizations and preventive care screenings were discussed with patient today  Appropriate education was printed on patient's after visit summary  Counseling:  · Exercise: the importance of regular exercise/physical activity was discussed  Recommend exercise 3-5 times per week for at least 30 minutes  No follow-ups on file       Chief Complaint:     Chief Complaint   Patient presents with    Physical Exam      History of Present Illness:     Adult Annual Physical   Patient here for a comprehensive physical exam  The patient reports problems - right foot pain, sees a podiatrist     Diet and Physical Activity  · Diet/Nutrition: well balanced diet  · Exercise: walking  Depression Screening  PHQ-2/9 Depression Screening         General Health  · Sleep: sleeps well  · Hearing: normal - bilateral   · Vision: no vision problems  · Dental: regular dental visits   Health  · Symptoms include: none     Review of Systems:     Review of Systems   Constitutional: Negative  HENT: Negative  Eyes: Negative  Respiratory: Negative  Cardiovascular: Negative  Gastrointestinal: Negative  Musculoskeletal: Negative  Neurological: Negative         Past Medical History:     Past Medical History:   Diagnosis Date    DVT (deep venous thrombosis) (HCC)     Dyslipidemia     Hemorrhoids     [place] (hard of hearing)     Hypertension     Membranous glomerulonephritis     Proteinuria     Pulmonary embolus (HCC)     Renal vein thrombosis (HCC)       Past Surgical History:     Past Surgical History:   Procedure Laterality Date    ADENOIDECTOMY      HEMORRHOID SURGERY      VASECTOMY        Family History:     Family History   Problem Relation Age of Onset    Colon polyps Mother     Heart disease Father     Hypertension Father     Coronary artery disease Father     Nephrolithiasis Father     No Known Problems Sister     No Known Problems Brother     Diabetes Paternal Grandfather     Stroke Neg Hx     Thyroid disease Neg Hx       Social History:     Social History     Socioeconomic History    Marital status: /Civil Union     Spouse name: None    Number of children: None    Years of education: None    Highest education level: None   Occupational History    None   Tobacco Use    Smoking status: Never Smoker    Smokeless tobacco: Never Used   Vaping Use    Vaping Use: Never used   Substance and Sexual Activity    Alcohol use: Yes     Comment: socialy1 beer every 2 months    Drug use: No    Sexual activity: Yes Other Topics Concern    None   Social History Narrative    None     Social Determinants of Health     Financial Resource Strain: Not on file   Food Insecurity: Not on file   Transportation Needs: Not on file   Physical Activity: Not on file   Stress: Not on file   Social Connections: Not on file   Intimate Partner Violence: Not on file   Housing Stability: Not on file      Current Medications:     Current Outpatient Medications   Medication Sig Dispense Refill    lisinopril (ZESTRIL) 20 mg tablet Take 20 mg by mouth daily        No current facility-administered medications for this visit  Allergies: Allergies   Allergen Reactions    Molds & Smuts       Physical Exam:     /96   Pulse 79   Ht 5' 9" (1 753 m)   Wt 111 kg (244 lb 8 oz)   SpO2 99%   BMI 36 11 kg/m²     Physical Exam  Vitals reviewed  Constitutional:       Appearance: Normal appearance  HENT:      Head: Normocephalic  Right Ear: Tympanic membrane, ear canal and external ear normal       Left Ear: Tympanic membrane, ear canal and external ear normal       Nose: Nose normal       Mouth/Throat:      Mouth: Mucous membranes are moist    Eyes:      Conjunctiva/sclera: Conjunctivae normal       Pupils: Pupils are equal, round, and reactive to light  Cardiovascular:      Rate and Rhythm: Normal rate and regular rhythm  Pulses: Normal pulses  Heart sounds: Normal heart sounds  Pulmonary:      Effort: Pulmonary effort is normal       Breath sounds: Normal breath sounds  Abdominal:      General: Abdomen is flat  Bowel sounds are normal       Palpations: Abdomen is soft  Musculoskeletal:         General: Normal range of motion  Cervical back: Normal range of motion and neck supple  Skin:     General: Skin is warm and dry  Neurological:      General: No focal deficit present  Mental Status: He is alert and oriented to person, place, and time     Psychiatric:         Mood and Affect: Mood normal  Behavior: Behavior normal           Balaji [place], [address] [facility]

## 2022-05-11 NOTE — PATIENT INSTRUCTIONS

## 2022-05-11 NOTE — ASSESSMENT & PLAN NOTE
Lab Results   Component Value Date    EGFR 63 05/19/2021    EGFR 65 06/29/2020    EGFR 65 05/07/2020    CREATININE 1 30 05/19/2021    CREATININE 1 28 06/29/2020    CREATININE 1 27 05/07/2020   bloodwork ordered

## 2022-05-13 ENCOUNTER — OFFICE VISIT (OUTPATIENT)
Dept: PODIATRY | Facility: CLINIC | Age: 52
End: 2022-05-13
Payer: COMMERCIAL

## 2022-05-13 VITALS
BODY MASS INDEX: 34.16 KG/M2 | HEIGHT: 71 IN | HEART RATE: 85 BPM | WEIGHT: 244 LBS | DIASTOLIC BLOOD PRESSURE: 70 MMHG | OXYGEN SATURATION: 99 % | SYSTOLIC BLOOD PRESSURE: 118 MMHG

## 2022-05-13 DIAGNOSIS — M79.671 RIGHT FOOT PAIN: ICD-10-CM

## 2022-05-13 DIAGNOSIS — M20.21 HALLUX RIGIDUS OF RIGHT FOOT: Primary | ICD-10-CM

## 2022-05-13 PROCEDURE — 1036F TOBACCO NON-USER: CPT | Performed by: STUDENT IN AN ORGANIZED HEALTH CARE EDUCATION/TRAINING PROGRAM

## 2022-05-13 PROCEDURE — 99212 OFFICE O/P EST SF 10 MIN: CPT | Performed by: STUDENT IN AN ORGANIZED HEALTH CARE EDUCATION/TRAINING PROGRAM

## 2022-05-13 PROCEDURE — 3008F BODY MASS INDEX DOCD: CPT | Performed by: STUDENT IN AN ORGANIZED HEALTH CARE EDUCATION/TRAINING PROGRAM

## 2022-05-13 NOTE — PATIENT INSTRUCTIONS
Voltaren Gel buy over the counter   Carbon fiber inserts   Compression stockings   Range of motion exercises

## 2022-05-13 NOTE — PROGRESS NOTES
Assessment/Plan:    No problem-specific Assessment & Plan notes found for this encounter  Diagnoses and all orders for this visit:    Hallux rigidus of right foot  -     Diclofenac Sodium (VOLTAREN) 1 %; Apply 2 g topically in the morning and 2 g at noon and 2 g in the evening and 2 g before bedtime  Right foot pain  -     Diclofenac Sodium (VOLTAREN) 1 %; Apply 2 g topically in the morning and 2 g at noon and 2 g in the evening and 2 g before bedtime  Plan:     - diagnosis and treatment discussed with patient  - I recommend to continue conservative management for hallux rigidus with supportive shoes carbon fiber inserts versus custom-made orthotics with modification, Voltaren gel and compression stockings as needed  I informed patient arthritis is a progressive deformity will likely worse with time however decrease it continue range-of-motion exercises to 1st MTPJ  I also briefly discussed surgical management with cheilectomy versus metatarsal osteotomy  Patient agrees with the treatment above  - return in 6 weeks pain is not improved we will obtain a new set of x-ray as well as an MRI  Subjective:      Patient ID: Phan Vergara is a 46 y o  male  59-year-old male presents with complaint of right 1st MTPJ pain and limited range of motion  Patient reports the injection helped somewhat however he persistent to have pain specifically with dorsiflexion of the toe  Patient does report the pain is not as worse as when he initially came for the 1st appointment  He has not tried Voltaren gel compressive dressings or over-the-counter carbon fiber orthotics  Patient denies any other complaints  Denies nausea vomiting fever chills shortness of breath        The following portions of the patient's history were reviewed and updated as appropriate:   He  has a past medical history of DVT (deep venous thrombosis) ([address] ), Dyslipidemia, Hemorrhoids, [place] (hard of hearing), Hypertension, Membranous glomerulonephritis, Proteinuria, Pulmonary embolus ([facility] ), and [facility] )  He   Patient Active Problem List    Diagnosis Date Noted    Acute gout due to renal impairment involving toe of right foot 04/16/2022    Snoring     SERA (obstructive sleep apnea)     COVID-19 virus infection 08/05/2021    Flu-like symptoms 08/03/2021    At risk for sleep apnea 04/19/2021    Obesity, Class II, BMI 35-39 9 03/17/2021    Mild depression 03/17/2021    Screening PSA (prostate specific antigen) 07/21/2020    Screening for malignant neoplasm of colon 07/21/2020    Screening for diabetes mellitus 07/21/2020    Decreased hearing of both ears 04/09/2019    Wellness examination 09/27/2018    Chronic kidney disease (CKD), stage III (moderate) ([facility] ) 04/11/2018    Membranous nephropathy determined by biopsy 04/11/2018    Essential hypertension 04/11/2018    Chronic pulmonary embolism ([facility] ) 02/21/2017     He  has a past surgical history that includes ADENOIDECTOMY; Hemorrhoid surgery; and Vasectomy       Review of Systems   Constitutional: Negative for chills and fever  Respiratory: Negative for apnea  Gastrointestinal: Negative for diarrhea, nausea and vomiting  Musculoskeletal: Positive for arthralgias and joint swelling  Skin: Negative for color change  Neurological: Negative for dizziness  Psychiatric/Behavioral: Negative for agitation  Objective:      /70 (BP Location: Left arm, Patient Position: Sitting, Cuff Size: Large)   Pulse 85   Ht 5' 11" (1 803 m)   Wt 111 kg (244 lb) Comment: verbal  SpO2 99%   BMI 34 03 kg/m²          Physical Exam  Vitals reviewed  Cardiovascular:      Rate and Rhythm: Normal rate  Pulses: Normal pulses  Pulmonary:      Effort: Pulmonary effort is normal    Musculoskeletal:         General: Swelling and tenderness present  Right foot: Decreased range of motion  Tenderness and bony tenderness present        Comments: Limited ROM to the right 1st MTPJ, pain with palpation to the medial and dorsal 1st MTPJ  No pain to sesamoid apparatus  Crepitus with ROM 1st MTPJ  Skin:     General: Skin is warm and dry  Neurological:      General: No focal deficit present  Mental Status: He is alert     Psychiatric:         Mood and Affect: Mood normal

## 2022-05-17 DIAGNOSIS — N18.31 STAGE 3A CHRONIC KIDNEY DISEASE (HCC): Primary | ICD-10-CM

## 2022-05-17 RX ORDER — LISINOPRIL 20 MG/1
20 TABLET ORAL DAILY
Qty: 90 TABLET | Refills: 3 | Status: SHIPPED | OUTPATIENT
Start: 2022-05-17

## 2022-05-24 ENCOUNTER — TELEPHONE (OUTPATIENT)
Dept: OBGYN CLINIC | Facility: CLINIC | Age: 52
End: 2022-05-24

## 2022-05-24 NOTE — TELEPHONE ENCOUNTER
Left message for patient in regards to one of his medications that were prescribed to him from Dr Barbara Sampson  I called [facility] DR LUCY WILSON and they stated that the medication is on hold from insurance stating to try 2 generic NSAIDs instead of the gel first  Asked patient to call the number on the back of his insurance card to see what those medications are and make us aware  Call back number was given for patient to call us back once that information was obtained

## 2022-05-26 ENCOUNTER — TELEPHONE (OUTPATIENT)
Dept: NEPHROLOGY | Facility: CLINIC | Age: 52
End: 2022-05-26

## 2022-05-27 ENCOUNTER — TELEPHONE (OUTPATIENT)
Dept: NEPHROLOGY | Facility: CLINIC | Age: 52
End: 2022-05-27

## 2022-05-27 NOTE — TELEPHONE ENCOUNTER
Received a call from patient stating he was recently diagnosed with arthritis in his foot and wanted to make sure it is okay for him to use voltaren for pain  Please advise      C/b is [phone]

## 2022-05-28 ENCOUNTER — LAB (OUTPATIENT)
Dept: LAB | Facility: CLINIC | Age: 52
End: 2022-05-28
Payer: COMMERCIAL

## 2022-05-28 DIAGNOSIS — N18.31 STAGE 3A CHRONIC KIDNEY DISEASE (HCC): ICD-10-CM

## 2022-05-28 DIAGNOSIS — G89.29 CHRONIC TOE PAIN, RIGHT FOOT: ICD-10-CM

## 2022-05-28 DIAGNOSIS — N02.2 MEMBRANOUS NEPHROPATHY DETERMINED BY BIOPSY: ICD-10-CM

## 2022-05-28 DIAGNOSIS — I10 ESSENTIAL HYPERTENSION: ICD-10-CM

## 2022-05-28 DIAGNOSIS — Z12.5 SCREENING PSA (PROSTATE SPECIFIC ANTIGEN): ICD-10-CM

## 2022-05-28 DIAGNOSIS — M79.674 CHRONIC TOE PAIN, RIGHT FOOT: ICD-10-CM

## 2022-05-28 LAB
ALBUMIN SERPL BCP-MCNC: 4.1 G/DL (ref 3.5–5)
ALP SERPL-CCNC: 77 U/L (ref 34–104)
ALT SERPL W P-5'-P-CCNC: 26 U/L (ref 7–52)
ANION GAP SERPL CALCULATED.3IONS-SCNC: 6 MMOL/L (ref 4–13)
AST SERPL W P-5'-P-CCNC: 19 U/L (ref 13–39)
BASOPHILS # BLD AUTO: 0.06 THOUSANDS/ΜL (ref 0–0.1)
BASOPHILS NFR BLD AUTO: 1 % (ref 0–1)
BILIRUB SERPL-MCNC: 1.06 MG/DL (ref 0.2–1)
BUN SERPL-MCNC: 20 MG/DL (ref 5–25)
CALCIUM SERPL-MCNC: 9.1 MG/DL (ref 8.4–10.2)
CHLORIDE SERPL-SCNC: 108 MMOL/L (ref 96–108)
CHOLEST SERPL-MCNC: 188 MG/DL
CO2 SERPL-SCNC: 25 MMOL/L (ref 21–32)
CREAT SERPL-MCNC: 1.26 MG/DL (ref 0.6–1.3)
CREAT UR-MCNC: 173.7 MG/DL
EOSINOPHIL # BLD AUTO: 0.07 THOUSAND/ΜL (ref 0–0.61)
EOSINOPHIL NFR BLD AUTO: 1 % (ref 0–6)
ERYTHROCYTE [DISTWIDTH] IN BLOOD BY AUTOMATED COUNT: 13.1 % (ref 11.6–15.1)
ERYTHROCYTE [SEDIMENTATION RATE] IN BLOOD: 7 MM/HOUR (ref 0–19)
EST. AVERAGE GLUCOSE BLD GHB EST-MCNC: 105 MG/DL
GFR SERPL CREATININE-BSD FRML MDRD: 65 ML/MIN/1.73SQ M
GLUCOSE P FAST SERPL-MCNC: 85 MG/DL (ref 65–99)
HBA1C MFR BLD: 5.3 %
HCT VFR BLD AUTO: 46 % (ref 36.5–49.3)
HDLC SERPL-MCNC: 40 MG/DL
HGB BLD-MCNC: 15.7 G/DL (ref 12–17)
IMM GRANULOCYTES # BLD AUTO: 0.02 THOUSAND/UL (ref 0–0.2)
IMM GRANULOCYTES NFR BLD AUTO: 0 % (ref 0–2)
LDLC SERPL CALC-MCNC: 127 MG/DL (ref 0–100)
LYMPHOCYTES # BLD AUTO: 2.02 THOUSANDS/ΜL (ref 0.6–4.47)
LYMPHOCYTES NFR BLD AUTO: 35 % (ref 14–44)
MCH RBC QN AUTO: 31.3 PG (ref 26.8–34.3)
MCHC RBC AUTO-ENTMCNC: 34.1 G/DL (ref 31.4–37.4)
MCV RBC AUTO: 92 FL (ref 82–98)
MONOCYTES # BLD AUTO: 0.55 THOUSAND/ΜL (ref 0.17–1.22)
MONOCYTES NFR BLD AUTO: 10 % (ref 4–12)
NEUTROPHILS # BLD AUTO: 3.03 THOUSANDS/ΜL (ref 1.85–7.62)
NEUTS SEG NFR BLD AUTO: 53 % (ref 43–75)
NRBC BLD AUTO-RTO: 0 /100 WBCS
PHOSPHATE SERPL-MCNC: 2.4 MG/DL (ref 2.7–4.5)
PLATELET # BLD AUTO: 226 THOUSANDS/UL (ref 149–390)
PMV BLD AUTO: 9.7 FL (ref 8.9–12.7)
POTASSIUM SERPL-SCNC: 4.3 MMOL/L (ref 3.5–5.3)
PROT SERPL-MCNC: 6.9 G/DL (ref 6.4–8.4)
PROT UR-MCNC: 88 MG/DL
PROT/CREAT UR: 0.51 MG/G{CREAT} (ref 0–0.1)
PSA SERPL-MCNC: 0.4 NG/ML (ref 0–4)
PTH-INTACT SERPL-MCNC: 36.3 PG/ML (ref 18.4–80.1)
RBC # BLD AUTO: 5.02 MILLION/UL (ref 3.88–5.62)
SODIUM SERPL-SCNC: 139 MMOL/L (ref 135–147)
TRIGL SERPL-MCNC: 107 MG/DL
URATE SERPL-MCNC: 8.4 MG/DL (ref 3.5–8.5)
WBC # BLD AUTO: 5.75 THOUSAND/UL (ref 4.31–10.16)

## 2022-05-28 PROCEDURE — 80061 LIPID PANEL: CPT

## 2022-05-28 PROCEDURE — 80053 COMPREHEN METABOLIC PANEL: CPT

## 2022-05-28 PROCEDURE — 82570 ASSAY OF URINE CREATININE: CPT

## 2022-05-28 PROCEDURE — 86200 CCP ANTIBODY: CPT

## 2022-05-28 PROCEDURE — 84100 ASSAY OF PHOSPHORUS: CPT

## 2022-05-28 PROCEDURE — 83036 HEMOGLOBIN GLYCOSYLATED A1C: CPT

## 2022-05-28 PROCEDURE — 84156 ASSAY OF PROTEIN URINE: CPT

## 2022-05-28 PROCEDURE — 36415 COLL VENOUS BLD VENIPUNCTURE: CPT

## 2022-05-28 PROCEDURE — 84550 ASSAY OF BLOOD/URIC ACID: CPT

## 2022-05-28 PROCEDURE — G0103 PSA SCREENING: HCPCS

## 2022-05-28 PROCEDURE — 83970 ASSAY OF PARATHORMONE: CPT

## 2022-05-28 PROCEDURE — 85652 RBC SED RATE AUTOMATED: CPT

## 2022-05-28 PROCEDURE — 85025 COMPLETE CBC W/AUTO DIFF WBC: CPT

## 2022-05-31 LAB — CCP AB SER IA-ACNC: 1.5

## 2022-05-31 NOTE — TELEPHONE ENCOUNTER
Spoke with patient, he is currently using Voltaren cream for foot pain  Discussed about risk of NSAIDs given underlying kidney disease      Most recent blood test reviewed, kidney function is stable and proteinuria minimal     At this moment okay to continue using Voltaren cream and he had an appointment with Rheumatology in a couple of months

## 2022-06-24 ENCOUNTER — OFFICE VISIT (OUTPATIENT)
Dept: PODIATRY | Facility: CLINIC | Age: 52
End: 2022-06-24
Payer: COMMERCIAL

## 2022-06-24 VITALS
BODY MASS INDEX: 34.22 KG/M2 | HEART RATE: 54 BPM | DIASTOLIC BLOOD PRESSURE: 62 MMHG | SYSTOLIC BLOOD PRESSURE: 118 MMHG | HEIGHT: 71 IN | OXYGEN SATURATION: 99 % | WEIGHT: 244.4 LBS

## 2022-06-24 DIAGNOSIS — M20.21 HALLUX RIGIDUS OF RIGHT FOOT: ICD-10-CM

## 2022-06-24 DIAGNOSIS — M19.071 ARTHRITIS OF FIRST METATARSOPHALANGEAL (MTP) JOINT OF RIGHT FOOT: Primary | ICD-10-CM

## 2022-06-24 PROCEDURE — 1036F TOBACCO NON-USER: CPT | Performed by: STUDENT IN AN ORGANIZED HEALTH CARE EDUCATION/TRAINING PROGRAM

## 2022-06-24 PROCEDURE — 3008F BODY MASS INDEX DOCD: CPT | Performed by: STUDENT IN AN ORGANIZED HEALTH CARE EDUCATION/TRAINING PROGRAM

## 2022-06-24 PROCEDURE — 99212 OFFICE O/P EST SF 10 MIN: CPT | Performed by: STUDENT IN AN ORGANIZED HEALTH CARE EDUCATION/TRAINING PROGRAM

## 2022-06-24 NOTE — PROGRESS NOTES
Assessment/Plan:    No problem-specific Assessment & Plan notes found for this encounter  Diagnoses and all orders for this visit:    Arthritis of first metatarsophalangeal (MTP) joint of right foot    Hallux rigidus of right foot      Plan:     -  Patient was counseled and educated on the condition and the diagnosis  The diagnosis, treatment options and prognosis were discussed in detail  - patient reports significant improvement in pain  Recommended to continue with range-of-motion exercises as well as supportive shoe gear with over-the-counter orthotics  He may use Voltaren gel as needed for pain control   - return in 3 months for a follow-up  Subjective:      Patient ID: Meliza Olivo is a 46 y o  male  51-year-old male presents for continued follow-up of right foot pain secondary to right 1st MTPJ arthritis and hallux rigidus  Patient reports he has been doing well without any pain to the foot  He did report about few weeks ago he had a flare of pain in his big toe which lasted about 3-4 days  He denies any other complaints at this time  Denies nausea vomiting fever chills shortness of breath  The following portions of the patient's history were reviewed and updated as appropriate:   He  has a past medical history of DVT (deep venous thrombosis) ([address] ), Dyslipidemia, Hemorrhoids, [place] (hard of hearing), Hypertension, Membranous glomerulonephritis, Proteinuria, Pulmonary embolus ([address] ), and Renal vein thrombosis ([address] )    He   Patient Active Problem List    Diagnosis Date Noted    Acute gout due to renal impairment involving toe of right foot 04/16/2022    Snoring     SERA (obstructive sleep apnea)     COVID-19 virus infection 08/05/2021    Flu-like symptoms 08/03/2021    At risk for sleep apnea 04/19/2021    Obesity, Class II, BMI 35-39 9 03/17/2021    Mild depression 03/17/2021    Screening PSA (prostate specific antigen) 07/21/2020    Screening for malignant neoplasm of colon 07/21/2020    Screening for diabetes mellitus 07/21/2020    Decreased hearing of both ears 04/09/2019    Wellness examination 09/27/2018    Chronic kidney disease (CKD), stage III (moderate) (HCC) 04/11/2018    Membranous nephropathy determined by biopsy 04/11/2018    Essential hypertension 04/11/2018    Chronic pulmonary embolism ([facility] ) 02/21/2017     He  has a past surgical history that includes ADENOIDECTOMY; Hemorrhoid surgery; and Vasectomy       Review of Systems   Constitutional: Negative for chills and fever  Respiratory: Negative for shortness of breath  Gastrointestinal: Negative for diarrhea, nausea and vomiting  Musculoskeletal: Negative for arthralgias  Skin: Negative for color change  Neurological: Negative for dizziness  Psychiatric/Behavioral: Negative for agitation  Objective:      /62 (BP Location: Left arm, Patient Position: Sitting, Cuff Size: Large)   Pulse (!) 54   Ht 5' 11" (1 803 m)   Wt 111 kg (244 lb 6 4 oz)   SpO2 99%   BMI 34 09 kg/m²          Physical Exam  Vitals reviewed  Cardiovascular:      Rate and Rhythm: Normal rate  Pulses: Normal pulses  Musculoskeletal:         General: No swelling or tenderness  Right foot: Decreased range of motion  Comments:  Limited ROM to the right 1st MTPJ, No pain with palpation to the medial and dorsal 1st MTPJ  No pain to sesamoid apparatus  Crepitus with ROM 1st MTPJ  Skin:     General: Skin is warm and dry  Neurological:      General: No focal deficit present  Mental Status: He is alert     Psychiatric:         Mood and Affect: Mood normal

## 2022-08-09 ENCOUNTER — TELEPHONE (OUTPATIENT)
Dept: NEPHROLOGY | Facility: HOSPITAL | Age: 52
End: 2022-08-09

## 2022-08-09 DIAGNOSIS — I10 ESSENTIAL HYPERTENSION: Primary | ICD-10-CM

## 2022-08-09 DIAGNOSIS — N18.30 STAGE 3 CHRONIC KIDNEY DISEASE, UNSPECIFIED WHETHER STAGE 3A OR 3B CKD (HCC): ICD-10-CM

## 2022-08-09 DIAGNOSIS — M10.371 ACUTE GOUT DUE TO RENAL IMPAIRMENT INVOLVING TOE OF RIGHT FOOT: ICD-10-CM

## 2022-08-15 ENCOUNTER — APPOINTMENT (OUTPATIENT)
Dept: LAB | Facility: CLINIC | Age: 52
End: 2022-08-15
Payer: COMMERCIAL

## 2022-08-15 DIAGNOSIS — N18.30 STAGE 3 CHRONIC KIDNEY DISEASE, UNSPECIFIED WHETHER STAGE 3A OR 3B CKD (HCC): ICD-10-CM

## 2022-08-15 DIAGNOSIS — I10 ESSENTIAL HYPERTENSION: ICD-10-CM

## 2022-08-15 DIAGNOSIS — M10.371 ACUTE GOUT DUE TO RENAL IMPAIRMENT INVOLVING TOE OF RIGHT FOOT: ICD-10-CM

## 2022-08-15 LAB
ALBUMIN SERPL BCP-MCNC: 4.1 G/DL (ref 3.5–5)
ANION GAP SERPL CALCULATED.3IONS-SCNC: 6 MMOL/L (ref 4–13)
BUN SERPL-MCNC: 18 MG/DL (ref 5–25)
CALCIUM SERPL-MCNC: 8.8 MG/DL (ref 8.4–10.2)
CHLORIDE SERPL-SCNC: 107 MMOL/L (ref 96–108)
CO2 SERPL-SCNC: 27 MMOL/L (ref 21–32)
CREAT SERPL-MCNC: 1.26 MG/DL (ref 0.6–1.3)
CREAT UR-MCNC: 164.7 MG/DL
ERYTHROCYTE [DISTWIDTH] IN BLOOD BY AUTOMATED COUNT: 13.2 % (ref 11.6–15.1)
GFR SERPL CREATININE-BSD FRML MDRD: 65 ML/MIN/1.73SQ M
GLUCOSE P FAST SERPL-MCNC: 89 MG/DL (ref 65–99)
HCT VFR BLD AUTO: 44.8 % (ref 36.5–49.3)
HGB BLD-MCNC: 15.9 G/DL (ref 12–17)
MCH RBC QN AUTO: 32.5 PG (ref 26.8–34.3)
MCHC RBC AUTO-ENTMCNC: 35.5 G/DL (ref 31.4–37.4)
MCV RBC AUTO: 92 FL (ref 82–98)
PHOSPHATE SERPL-MCNC: 2.9 MG/DL (ref 2.7–4.5)
PLATELET # BLD AUTO: 228 THOUSANDS/UL (ref 149–390)
PMV BLD AUTO: 9.7 FL (ref 8.9–12.7)
POTASSIUM SERPL-SCNC: 4.1 MMOL/L (ref 3.5–5.3)
PROT UR-MCNC: 58 MG/DL
PROT/CREAT UR: 0.35 MG/G{CREAT} (ref 0–0.1)
PTH-INTACT SERPL-MCNC: 57.9 PG/ML (ref 18.4–80.1)
RBC # BLD AUTO: 4.89 MILLION/UL (ref 3.88–5.62)
SODIUM SERPL-SCNC: 140 MMOL/L (ref 135–147)
WBC # BLD AUTO: 5.91 THOUSAND/UL (ref 4.31–10.16)

## 2022-08-15 PROCEDURE — 84156 ASSAY OF PROTEIN URINE: CPT

## 2022-08-15 PROCEDURE — 80069 RENAL FUNCTION PANEL: CPT

## 2022-08-15 PROCEDURE — 36415 COLL VENOUS BLD VENIPUNCTURE: CPT

## 2022-08-15 PROCEDURE — 83970 ASSAY OF PARATHORMONE: CPT

## 2022-08-15 PROCEDURE — 82570 ASSAY OF URINE CREATININE: CPT

## 2022-08-15 PROCEDURE — 85027 COMPLETE CBC AUTOMATED: CPT

## 2022-08-16 ENCOUNTER — OFFICE VISIT (OUTPATIENT)
Dept: NEPHROLOGY | Facility: CLINIC | Age: 52
End: 2022-08-16
Payer: COMMERCIAL

## 2022-08-16 ENCOUNTER — TELEPHONE (OUTPATIENT)
Dept: NEPHROLOGY | Facility: CLINIC | Age: 52
End: 2022-08-16

## 2022-08-16 VITALS
BODY MASS INDEX: 34.1 KG/M2 | DIASTOLIC BLOOD PRESSURE: 96 MMHG | HEART RATE: 93 BPM | WEIGHT: 243.6 LBS | SYSTOLIC BLOOD PRESSURE: 139 MMHG | HEIGHT: 71 IN | OXYGEN SATURATION: 97 %

## 2022-08-16 DIAGNOSIS — N02.2 MEMBRANOUS NEPHROPATHY DETERMINED BY BIOPSY: ICD-10-CM

## 2022-08-16 DIAGNOSIS — I10 ESSENTIAL HYPERTENSION: ICD-10-CM

## 2022-08-16 DIAGNOSIS — E66.9 OBESITY, CLASS II, BMI 35-39.9: ICD-10-CM

## 2022-08-16 DIAGNOSIS — N18.30 STAGE 3 CHRONIC KIDNEY DISEASE, UNSPECIFIED WHETHER STAGE 3A OR 3B CKD (HCC): Primary | ICD-10-CM

## 2022-08-16 PROCEDURE — 99214 OFFICE O/P EST MOD 30 MIN: CPT | Performed by: INTERNAL MEDICINE

## 2022-08-16 NOTE — PATIENT INSTRUCTIONS
As we discussed in the office visit, based on your most recent blood and urine test your kidney function remains stable  Your blood pressure today in the office is elevated even after rechecking it  Recommend to check your blood pressure at home at least 3 times a week and keep a blood pressure log and call us with blood pressure readings after 2-3 weeks  Your blood pressure remains elevated we may have to consider increase in your blood pressure medication  Follow low-salt diet  Stay physically active  Stay well-hydrated  Keep a good weight  Follow regularly with primary care doctor    I would like to see you back in the office in 1 year with repeat labs

## 2022-08-16 NOTE — PROGRESS NOTES
OFFICE FOLLOW UP - Nephrology   Claudell Demark 46 y o  male MRN: [id number]       ASSESSMENT and PLAN:  Brian Rosa was seen today for follow-up and chronic kidney disease  Diagnoses and all orders for this visit:    Stage 3 chronic kidney disease, unspecified whether stage 3a or 3b CKD ([facility] [address] )  -     CBC; Future  -     Renal function panel; Future  -     PTH, intact; Future  -     Protein / creatinine ratio, urine; Future    Essential hypertension    Membranous nephropathy determined by biopsy    Obesity, Class II, BMI 35-39 9        This is 46years-old gentleman with history of chronic kidney disease stage 2/3 secondary to biopsy-proven idiopathic membranous glomerulonephritis done on 04/2013, previously treated with prednisone and tacrolimus, he has been off steroids since June 2015, off tacrolimus since October 2016, returns for year follow-up  1   Chronic kidney disease stage 2/3A  Function is stable with most recent creatinine of 1 26 and a UPC of 0 3  Previous creatinine around 1 3 to 1 5 since 2014  Blood pressure today is elevated after recheck it  He is currently on lisinopril 20 mg daily  Comment to check blood pressure at home at least 3 times a week and contact us with blood pressure readings blood pressure remains elevated will increase lisinopril dose  Advised to follow low-salt diet, advised to lose weight, avoid NSAIDs  I would like to see him back in the office in 1 year with repeat labs    2  Hypertension, blood pressure elevated today, on lisinopril 20 mg daily  Follow low-salt diet  Advised to check blood pressure at home for the next 2 weeks and contact us with blood pressure readings    3  Biopsy proving idiopathic membranous glomerulonephritis, biopsy done on 04/2013, previously treated with combination of steroids and tacrolimus, off any immunosuppressive since October 2016  Most recent proteinuria 0 35 gr on lisinopril 20 mg daily      4   Hemoglobin normal at 15 9, follow labs in 1 year  5   Obesity, advised to lose weight  Patient Instructions   As we discussed in the office visit, based on your most recent blood and urine test your kidney function remains stable  Your blood pressure today in the office is elevated even after rechecking it  Recommend to check your blood pressure at home at least 3 times a week and keep a blood pressure log and call us with blood pressure readings after 2-3 weeks  Your blood pressure remains elevated we may have to consider increase in your blood pressure medication  Follow low-salt diet  Stay physically active  Stay well-hydrated  Keep a good weight  Follow regularly with primary care doctor  I would like to see you back in the office in 1 year with repeat labs          HPI: Sonia Waldron is a 46 y o  male who is here for Follow-up and Chronic Kidney Disease    Patient with known history of chronic kidney disease secondary to biopsy-proven membranous nephropathy done on 04/2013  Last time patient was seen was on 07/2021  Today returns for year follow-up  Since our last visit, there has been no ER visits or hospitilizations  Patient currently has no complaints at this time and is doing well other than hearing problem since he has to same 1 of his hearing aid to repair  Patient denies any chest pain, shortness of breath or leg swelling  Denies any urinary problems, no significant foamy urine  Denies any abdominal pain, no nausea, no vomiting, no diarrhea constipation  Denies NSAID use    The last blood work was done on 08/15/2022, which we have reviewed together  Recent creatinine 1 26 estimated GFR of 65, most recent UPC 0 35      ROS: All the systems were reviewed and were negative except as documented on the H&P  Allergies: Molds & smuts    Medications:   Current Outpatient Medications:     lisinopril (ZESTRIL) 20 mg tablet, Take 1 tablet (20 mg total) by mouth in the morning   (Patient taking differently: Take 20 mg by mouth daily at bedtime), Disp: 90 tablet, Rfl: 3    Diclofenac Sodium (VOLTAREN) 1 %, Apply 2 g topically in the morning and 2 g at noon and 2 g in the evening and 2 g before bedtime  (Patient not taking: No sig reported), Disp: 50 g, Rfl: 3    Past Medical History:   Diagnosis Date    DVT (deep venous thrombosis) (HCC)     Dyslipidemia     Hemorrhoids     [place] (hard of hearing)     Hypertension     Membranous glomerulonephritis     Proteinuria     Pulmonary embolus (HCC)     Renal vein thrombosis (HCC)      Past Surgical History:   Procedure Laterality Date    ADENOIDECTOMY      HEMORRHOID SURGERY      VASECTOMY       Family History   Problem Relation Age of Onset    Colon polyps Mother     Heart disease Father     Hypertension Father     Coronary artery disease Father     Nephrolithiasis Father     No Known Problems Sister     No Known Problems Brother     Diabetes Paternal Grandfather     Stroke Neg Hx     Thyroid disease Neg Hx       reports that he has never smoked  He has never used smokeless tobacco  He reports current alcohol use  He reports that he does not use drugs  Physical Exam:   Vitals:    08/16/22 1529 08/16/22 1550   BP: (!) 148/110 139/96   BP Location: Left arm Left arm   Patient Position: Sitting Sitting   Cuff Size: Large Standard   Pulse: 93    SpO2: 97%    Weight: 110 kg (243 lb 9 6 oz)    Height: 5' 11" (1 803 m)      Body mass index is 33 98 kg/m²      General: conscious, cooperative, in not acute distress  Eyes: conjunctivae pink, anicteric sclerae  ENT: lips and mucous membranes moist  Neck: supple, no JVD  Chest: clear breath sounds bilateral, no crackles, ronchus or wheezings  CVS: distinct S1 & S2, normal rate, regular rhythm  Abdomen: soft, non-tender, non-distended, normoactive bowel sounds  Back: no CVA tenderness  Extremities: no edema of both legs  Skin: no rash  Neuro: awake, alert, oriented          Laboratory Results:  Lab Results   Component Value Date    WBC 5 91 08/15/2022    HGB 15 9 08/15/2022    HCT 44 8 08/15/2022    MCV 92 08/15/2022     08/15/2022     Lab Results   Component Value Date    SODIUM 140 08/15/2022    K 4 1 08/15/2022     08/15/2022    CO2 27 08/15/2022    BUN 18 08/15/2022    CREATININE 1 26 08/15/2022    GLUC 80 05/19/2021    CALCIUM 8 8 08/15/2022       Lab Results   Component Value Date    PTH 57 9 08/15/2022    CALCIUM 8 8 08/15/2022    PHOS 2 9 08/15/2022         Portions of the record may have been created with voice recognition software  Occasional wrong word or "sound a like" substitutions may have occurred due to the inherent limitations of voice recognition software  Read the chart carefully and recognize, using context, where substitutions have occurred  If you have any questions, please contact the dictating provider

## 2022-09-30 ENCOUNTER — VBI (OUTPATIENT)
Dept: ADMINISTRATIVE | Facility: OTHER | Age: 52
End: 2022-09-30

## 2022-11-18 ENCOUNTER — TELEPHONE (OUTPATIENT)
Dept: SLEEP CENTER | Facility: CLINIC | Age: 52
End: 2022-11-18

## 2022-11-18 NOTE — TELEPHONE ENCOUNTER
Patient left voice message stating he has returned his CPAP machine to [facility] since he was only using it about 6 hours a week

## 2022-12-11 ENCOUNTER — VBI (OUTPATIENT)
Dept: ADMINISTRATIVE | Facility: OTHER | Age: 52
End: 2022-12-11

## 2023-05-26 DIAGNOSIS — N18.31 STAGE 3A CHRONIC KIDNEY DISEASE (HCC): ICD-10-CM

## 2023-05-26 RX ORDER — LISINOPRIL 20 MG/1
TABLET ORAL
Qty: 90 TABLET | Refills: 3 | Status: SHIPPED | OUTPATIENT
Start: 2023-05-26

## 2023-07-31 ENCOUNTER — TELEPHONE (OUTPATIENT)
Dept: NEPHROLOGY | Facility: CLINIC | Age: 53
End: 2023-07-31

## 2023-08-01 ENCOUNTER — APPOINTMENT (OUTPATIENT)
Dept: LAB | Facility: CLINIC | Age: 53
End: 2023-08-01
Payer: COMMERCIAL

## 2023-08-01 ENCOUNTER — RA CDI HCC (OUTPATIENT)
Dept: OTHER | Facility: HOSPITAL | Age: 53
End: 2023-08-01

## 2023-08-01 DIAGNOSIS — N18.30 STAGE 3 CHRONIC KIDNEY DISEASE, UNSPECIFIED WHETHER STAGE 3A OR 3B CKD (HCC): ICD-10-CM

## 2023-08-01 LAB
ALBUMIN SERPL BCP-MCNC: 4 G/DL (ref 3.5–5)
ANION GAP SERPL CALCULATED.3IONS-SCNC: 5 MMOL/L
BUN SERPL-MCNC: 21 MG/DL (ref 5–25)
CALCIUM SERPL-MCNC: 9 MG/DL (ref 8.4–10.2)
CHLORIDE SERPL-SCNC: 109 MMOL/L (ref 96–108)
CO2 SERPL-SCNC: 26 MMOL/L (ref 21–32)
CREAT SERPL-MCNC: 1.23 MG/DL (ref 0.6–1.3)
CREAT UR-MCNC: 205.5 MG/DL
ERYTHROCYTE [DISTWIDTH] IN BLOOD BY AUTOMATED COUNT: 13 % (ref 11.6–15.1)
GFR SERPL CREATININE-BSD FRML MDRD: 66 ML/MIN/1.73SQ M
GLUCOSE P FAST SERPL-MCNC: 79 MG/DL (ref 65–99)
HCT VFR BLD AUTO: 45.5 % (ref 36.5–49.3)
HGB BLD-MCNC: 15.3 G/DL (ref 12–17)
MCH RBC QN AUTO: 31.2 PG (ref 26.8–34.3)
MCHC RBC AUTO-ENTMCNC: 33.6 G/DL (ref 31.4–37.4)
MCV RBC AUTO: 93 FL (ref 82–98)
PHOSPHATE SERPL-MCNC: 3.9 MG/DL (ref 2.7–4.5)
PLATELET # BLD AUTO: 219 THOUSANDS/UL (ref 149–390)
PMV BLD AUTO: 10 FL (ref 8.9–12.7)
POTASSIUM SERPL-SCNC: 4 MMOL/L (ref 3.5–5.3)
PROT UR-MCNC: 282 MG/DL
PROT/CREAT UR: 1.37 MG/G{CREAT} (ref 0–0.1)
PTH-INTACT SERPL-MCNC: 69.5 PG/ML (ref 12–88)
RBC # BLD AUTO: 4.91 MILLION/UL (ref 3.88–5.62)
SODIUM SERPL-SCNC: 140 MMOL/L (ref 135–147)
WBC # BLD AUTO: 5.57 THOUSAND/UL (ref 4.31–10.16)

## 2023-08-01 PROCEDURE — 36415 COLL VENOUS BLD VENIPUNCTURE: CPT

## 2023-08-01 PROCEDURE — 83970 ASSAY OF PARATHORMONE: CPT

## 2023-08-01 PROCEDURE — 84156 ASSAY OF PROTEIN URINE: CPT

## 2023-08-01 PROCEDURE — 82570 ASSAY OF URINE CREATININE: CPT

## 2023-08-01 PROCEDURE — 85027 COMPLETE CBC AUTOMATED: CPT

## 2023-08-01 PROCEDURE — 80069 RENAL FUNCTION PANEL: CPT

## 2023-08-01 NOTE — PROGRESS NOTES
F320    [address] [facility] coding opportunities          Chart Reviewed number of suggestions sent to Provider: 1     Patients Insurance        Commercial Insurance: Olvin Monsivais

## 2023-08-10 ENCOUNTER — OFFICE VISIT (OUTPATIENT)
Dept: NEPHROLOGY | Facility: CLINIC | Age: 53
End: 2023-08-10

## 2023-08-10 VITALS
DIASTOLIC BLOOD PRESSURE: 98 MMHG | SYSTOLIC BLOOD PRESSURE: 168 MMHG | OXYGEN SATURATION: 99 % | HEART RATE: 67 BPM | WEIGHT: 237 LBS | HEIGHT: 71 IN | BODY MASS INDEX: 33.18 KG/M2

## 2023-08-10 DIAGNOSIS — N18.31 STAGE 3A CHRONIC KIDNEY DISEASE (HCC): Primary | ICD-10-CM

## 2023-08-10 DIAGNOSIS — I10 ESSENTIAL HYPERTENSION: ICD-10-CM

## 2023-08-10 DIAGNOSIS — N02.2 MEMBRANOUS NEPHROPATHY DETERMINED BY BIOPSY: ICD-10-CM

## 2023-08-10 DIAGNOSIS — E66.9 OBESITY, CLASS II, BMI 35-39.9: ICD-10-CM

## 2023-08-10 RX ORDER — LISINOPRIL 40 MG/1
40 TABLET ORAL DAILY
Qty: 90 TABLET | Refills: 2 | Status: SHIPPED | OUTPATIENT
Start: 2023-08-10 | End: 2023-11-08

## 2023-08-10 NOTE — PATIENT INSTRUCTIONS
As we discussed in the office visit, your blood pressure is elevated after recheck it. Recommend to increase lisinopril to 40 mg daily. Keep checking your blood pressure at home at least 3-4 times a week and keep a log, please contact with your home blood pressure readings after 2 weeks  Follow low-salt diet less than 2 grams sodium a day. Stay well-hydrated. Avoid NSAIDs. Okay to take Tylenol or acetaminophen as needed for pain. Keep working on losing weight. Recommend to contact your primary care doctor and continue close follow-up with him. I would like to see you back in the office in 4 months with repeat labs.

## 2023-08-10 NOTE — PROGRESS NOTES
OFFICE FOLLOW UP - Nephrology   Angela Guerin 48 y.o. male MRN: [id number]       ASSESSMENT and PLAN:  Adam Pop was seen today for follow-up and chronic kidney disease. Diagnoses and all orders for this visit:    Stage 3a chronic kidney disease ([address])  -     Renal function panel; Future  -     Protein / creatinine ratio, urine; Future    Membranous nephropathy determined by biopsy  -     lisinopril (ZESTRIL) 40 mg tablet; Take 1 tablet (40 mg total) by mouth daily  -     Renal function panel; Future  -     Protein / creatinine ratio, urine; Future    Essential hypertension  -     lisinopril (ZESTRIL) 40 mg tablet; Take 1 tablet (40 mg total) by mouth daily  -     Renal function panel; Future  -     Protein / creatinine ratio, urine; Future    Obesity, Class II, BMI 35-39.9        This is 48years-old gentleman with history of chronic kidney disease stage 2/3 secondary to biopsy-proven idiopathic membranous glomerulonephritis, kidney bx on 04/2013, previously treated with prednisone and tacrolimus, he has been off steroids since June 2015, off tacrolimus since October 2016, Adam Pop returns today to the office for year follow-up with his wife. 1.  Chronic kidney disease stage 2/3A. Recent blood test were reviewed with patient and with his wife, kidney function is stable with a more recent creatinine of 1.23 and an estimated GFR of 66, noted worsening proteinuria with a UPC up to 1.37. Previous creatinine around 1.3 to 1.5 since 2014  Blood pressure at home elevated over the last couple of weeks, blood pressure today in the office also elevated. Discussed with patient about importance to follow low-salt diet, diet information provided. Plan to increase lisinopril to 40 mg daily, check blood pressure at home for the next 2 weeks and contact us with home blood pressure readings. Discussed also about importance of losing weight, stay well-hydrated to avoid NSAIDs.   I would like to see him back in the office in 4 months with repeat labs    2. Hypertension, blood pressure today elevated, plan to increase lisinopril to 40 mg daily as above. Follow low-salt diet and contact us with home blood pressure readings after 2 weeks. Advise to contact his primary care doctor to continue close follow-up with him also, noted last time was seen was on 5/2022    3. Biopsy proving idiopathic membranous glomerulonephritis, biopsy done on 04/2013, previously treated with combination of steroids and tacrolimus, off immunosuppressive Rx since October 2016. Noted most recent proteinuria increased to 1.37, increase ACE inhibitor as above, follow labs in 4 months    4. Hemoglobin normal at 15.3, follow labs in 1 year. 5.  Obesity, advised to lose weight. He lost 6 pounds since last office visit        Patient Instructions   As we discussed in the office visit, your blood pressure is elevated after recheck it. Recommend to increase lisinopril to 40 mg daily. Keep checking your blood pressure at home at least 3-4 times a week and keep a log, please contact with your home blood pressure readings after 2 weeks  Follow low-salt diet less than 2 grams sodium a day. Stay well-hydrated. Avoid NSAIDs. Okay to take Tylenol or acetaminophen as needed for pain. Keep working on losing weight. Recommend to contact your primary care doctor and continue close follow-up with him. I would like to see you back in the office in 4 months with repeat labs. HPI: Ila Prasad is a 48 y.o. male who is here for Follow-up and Chronic Kidney Disease    Patient with known history of chronic kidney disease secondary to biopsy-proven membranous nephropathy done on 04/2013. Last time patient was seen was on 08/2022. Today returns for year follow-up with his wife Sheron Sweet. Since our last visit, there has been no ER visits or hospitilizations.      Today brought his home blood pressure readings, noted over the last couple of weeks his blood pressure is being elevated with systolic fluctuating in the 150s to 160s  Patient denies any chest pain, shortness of breath or leg swelling. Denies any urinary problems, no significant foamy urine. Denies any abdominal pain, no nausea, no vomiting, no diarrhea constipation. Denies NSAID use    The last blood work was done on 08/01/2023, which we have reviewed together. Recent creatinine 1.23 estimated GFR of 66, serum albumin 4.0, most recent UPC 1.37      ROS: All the systems were reviewed and were negative except as documented on the H&P. Allergies: Molds & smuts    Medications:   Current Outpatient Medications:   •  lisinopril (ZESTRIL) 40 mg tablet, Take 1 tablet (40 mg total) by mouth daily, Disp: 90 tablet, Rfl: 2  •  Diclofenac Sodium (VOLTAREN) 1 %, Apply 2 g topically in the morning and 2 g at noon and 2 g in the evening and 2 g before bedtime. (Patient not taking: Reported on 6/24/2022), Disp: 50 g, Rfl: 3    Past Medical History:   Diagnosis Date   • DVT (deep venous thrombosis) (HCC)    • Dyslipidemia    • Hemorrhoids    • [place] (hard of hearing)    • Hypertension    • Membranous glomerulonephritis    • Proteinuria    • Pulmonary embolus (HCC)    • Renal vein thrombosis (HCC)      Past Surgical History:   Procedure Laterality Date   • ADENOIDECTOMY     • HEMORRHOID SURGERY     • VASECTOMY       Family History   Problem Relation Age of Onset   • Colon polyps Mother    • Heart disease Father    • Hypertension Father    • Coronary artery disease Father    • Nephrolithiasis Father    • No Known Problems Sister    • No Known Problems Brother    • Diabetes Paternal Grandfather    • Stroke Neg Hx    • Thyroid disease Neg Hx       reports that he has never smoked. He has never used smokeless tobacco. He reports current alcohol use. He reports that he does not use drugs.       Physical Exam:   Vitals:    08/10/23 1443   BP: 168/98   BP Location: Left arm   Patient Position: Sitting   Cuff Size: Adult   Pulse: 67 SpO2: 99%   Weight: 108 kg (237 lb)   Height: 5' 11" (1.803 m)     Body mass index is 33.05 kg/m². General: conscious, cooperative, in not acute distress  Eyes: conjunctivae pink, anicteric sclerae  ENT: lips and mucous membranes moist  Neck: supple, no JVD  Chest: clear breath sounds bilateral, no crackles, ronchus or wheezings  CVS: distinct S1 & S2, normal rate, regular rhythm  Abdomen: soft, non-tender, non-distended, normoactive bowel sounds  Back: no CVA tenderness  Extremities: no edema of both legs  Skin: no rash  Neuro: awake, alert, oriented        Laboratory Results:  Lab Results   Component Value Date    WBC 5.57 08/01/2023    HGB 15.3 08/01/2023    HCT 45.5 08/01/2023    MCV 93 08/01/2023     08/01/2023     Lab Results   Component Value Date    SODIUM 140 08/01/2023    K 4.0 08/01/2023     (H) 08/01/2023    CO2 26 08/01/2023    BUN 21 08/01/2023    CREATININE 1.23 08/01/2023    GLUC 80 05/19/2021    CALCIUM 9.0 08/01/2023       Lab Results   Component Value Date    PTH 69.5 08/01/2023    CALCIUM 9.0 08/01/2023    PHOS 3.9 08/01/2023         Portions of the record may have been created with voice recognition software. Occasional wrong word or "sound a like" substitutions may have occurred due to the inherent limitations of voice recognition software. Read the chart carefully and recognize, using context, where substitutions have occurred. If you have any questions, please contact the dictating provider.

## 2023-08-26 NOTE — TELEPHONE ENCOUNTER
Spoke with pt and made him aware that lab work has been reviewed and shows stable kidney fx as well minimal proteinuria  Pt has been scheduled for f/u on 7/22  He has verbalized understanding of test results and has no questions or concerns at this time  PAST SURGICAL HISTORY:  H/O  section

## 2023-10-06 ENCOUNTER — VBI (OUTPATIENT)
Dept: ADMINISTRATIVE | Facility: OTHER | Age: 53
End: 2023-10-06

## 2023-10-09 ENCOUNTER — OFFICE VISIT (OUTPATIENT)
Dept: URGENT CARE | Age: 53
End: 2023-10-09
Payer: COMMERCIAL

## 2023-10-09 VITALS
RESPIRATION RATE: 18 BRPM | HEART RATE: 84 BPM | BODY MASS INDEX: 33.64 KG/M2 | WEIGHT: 235 LBS | DIASTOLIC BLOOD PRESSURE: 100 MMHG | HEIGHT: 70 IN | OXYGEN SATURATION: 98 % | TEMPERATURE: 98.7 F | SYSTOLIC BLOOD PRESSURE: 167 MMHG

## 2023-10-09 DIAGNOSIS — R05.1 ACUTE COUGH: ICD-10-CM

## 2023-10-09 DIAGNOSIS — J01.10 ACUTE FRONTAL SINUSITIS, RECURRENCE NOT SPECIFIED: Primary | ICD-10-CM

## 2023-10-09 LAB
SARS-COV-2 AG UPPER RESP QL IA: NEGATIVE
VALID CONTROL: NORMAL

## 2023-10-09 PROCEDURE — 87811 SARS-COV-2 COVID19 W/OPTIC: CPT | Performed by: STUDENT IN AN ORGANIZED HEALTH CARE EDUCATION/TRAINING PROGRAM

## 2023-10-09 PROCEDURE — S9083 URGENT CARE CENTER GLOBAL: HCPCS | Performed by: STUDENT IN AN ORGANIZED HEALTH CARE EDUCATION/TRAINING PROGRAM

## 2023-10-09 PROCEDURE — G0382 LEV 3 HOSP TYPE B ED VISIT: HCPCS | Performed by: STUDENT IN AN ORGANIZED HEALTH CARE EDUCATION/TRAINING PROGRAM

## 2023-10-09 RX ORDER — AMOXICILLIN AND CLAVULANATE POTASSIUM 875; 125 MG/1; MG/1
1 TABLET, FILM COATED ORAL EVERY 12 HOURS SCHEDULED
Qty: 14 TABLET | Refills: 0 | Status: SHIPPED | OUTPATIENT
Start: 2023-10-11 | End: 2023-10-18

## 2023-10-09 NOTE — PATIENT INSTRUCTIONS
To help with congestion, I recommend taking Mucinex  to 1200 mg every 12 hours. It is important to take it with plenty of water. To help clear out the mucus and reduce post-nasal drip - use saline nasal spray or saline nasal rinse (with distilled or boiled water) to help clear out mucus and reduce postnasal drip. To soothe sore throat, you may try warm tea with honey. You may take Tylenol or Motrin to help with fever/chills or muscle aches. Stay well hydrated and get plenty of rest.     If your symptoms are worsening or fail to improve in the coming days days, please return to see us or schedule with your PCP.     Nasal Saline Rinse:

## 2023-10-09 NOTE — PROGRESS NOTES
[facility]        NAME: Geovani Velez is a 48 y.o. male  : 1970    MRN: [id number]  DATE: 2023  TIME: 1:58 PM    Assessment and Plan   Acute frontal sinusitis, recurrence not specified [J01.10]  1. Acute frontal sinusitis, recurrence not specified  amoxicillin-clavulanate (AUGMENTIN) 875-125 mg per tablet      2. Acute cough  Poct Covid 19 Rapid Antigen Test      Patient's symptoms are consistent with acute sinusitis. We discussed that at this point, his symptoms are more likely viral and he is gradually but slowly improving. Discussed that if he continues to improve over the next 2 days, would continue to treat with supportive measures. However, if he is worsening or making no progress over the next 2 days, he will start antibiotic. He was provided paper prescription for Augmentin. Starting today, he will add Mucinex twice daily with good hydration. Also advised nasal saline spray or rinse with distilled or boiled water. Patient Instructions       Follow up with PCP in 3-5 days. Proceed to  ER if symptoms worsen. Chief Complaint     Chief Complaint   Patient presents with   • Cough     Started with symptoms last Wed. Tylenol and Claritin symptoms . Helping with sleeping at night . Cough is productive , green in color   • Cold Like Symptoms   • Chills   • Fatigue         History of Present Illness       6 days of symptoms which include congestion, postnasal drip, sinus pressure and maxillary and frontal area. Currently worse in frontal area. He also notes that he has been having green mucus when he blows his nose and when he coughs. He has had previous sinus infections but none recently. He feels that he is slightly improving, however this is going more slowly than he would typically expect. Mornings are worse  Tried claritin and tylenol at night, helps some to sleep. Some chills 2 nights ago, no measured fever. No sob, cp.       Review of Systems   Review of Systems   All other systems reviewed and are negative. Current Medications       Current Outpatient Medications:   •  [START ON 10/11/2023] amoxicillin-clavulanate (AUGMENTIN) 875-125 mg per tablet, Take 1 tablet by mouth every 12 (twelve) hours for 7 days Do not start before October 11, 2023., Disp: 14 tablet, Rfl: 0  •  lisinopril (ZESTRIL) 40 mg tablet, Take 1 tablet (40 mg total) by mouth daily, Disp: 90 tablet, Rfl: 2  •  Diclofenac Sodium (VOLTAREN) 1 %, Apply 2 g topically in the morning and 2 g at noon and 2 g in the evening and 2 g before bedtime. (Patient not taking: Reported on 6/24/2022), Disp: 50 g, Rfl: 3    Current Allergies     Allergies as of 10/09/2023 - Reviewed 10/09/2023   Allergen Reaction Noted   • Molds & smuts  07/05/2012            The following portions of the patient's history were reviewed and updated as appropriate: allergies, current medications, past family history, past medical history, past social history, past surgical history and problem list.     Past Medical History:   Diagnosis Date   • DVT (deep venous thrombosis) ([address])    • Dyslipidemia    • Hemorrhoids    • [place] (hard of hearing)    • Hypertension    • Membranous glomerulonephritis    • Proteinuria    • Pulmonary embolus ([address])    • Renal vein thrombosis ([address])        Past Surgical History:   Procedure Laterality Date   • ADENOIDECTOMY     • HEMORRHOID SURGERY     • VASECTOMY         Family History   Problem Relation Age of Onset   • Colon polyps Mother    • Heart disease Father    • Hypertension Father    • Coronary artery disease Father    • Nephrolithiasis Father    • No Known Problems Sister    • No Known Problems Brother    • Diabetes Paternal Grandfather    • Stroke Neg Hx    • Thyroid disease Neg Hx          Medications have been verified.         Objective   /100   Pulse 84   Temp 98.7 °F (37.1 °C) (Temporal)   Resp 18   Ht 5' 10" (1.778 m)   Wt 107 kg (235 lb)   SpO2 98%   BMI 33.72 kg/m²   No LMP for male patient. Physical Exam     Physical Exam  Vitals and nursing note reviewed. Constitutional:       General: He is not in acute distress. Appearance: Normal appearance. He is not ill-appearing or toxic-appearing. HENT:      Head: Normocephalic and atraumatic. Right Ear: External ear normal.      Left Ear: External ear normal.      Nose: Congestion present. Mouth/Throat:      Mouth: Mucous membranes are moist.      Comments: Pressure with palpation of maxillary sinuses, mild bl  Same with frontal  Cobblestoning, pnd  Eyes:      Extraocular Movements: Extraocular movements intact. Cardiovascular:      Rate and Rhythm: Normal rate and regular rhythm. Heart sounds: Normal heart sounds. Pulmonary:      Effort: Pulmonary effort is normal. No respiratory distress. Breath sounds: Normal breath sounds. No stridor. No wheezing, rhonchi or rales. Musculoskeletal:         General: No swelling, tenderness or deformity. Right lower leg: No edema. Left lower leg: No edema. Skin:     General: Skin is warm and dry. Capillary Refill: Capillary refill takes less than 2 seconds. Findings: No rash. Neurological:      Mental Status: He is alert.       Gait: Gait normal.   Psychiatric:         Behavior: Behavior normal.

## 2023-10-30 ENCOUNTER — OFFICE VISIT (OUTPATIENT)
Dept: INTERNAL MEDICINE CLINIC | Facility: CLINIC | Age: 53
End: 2023-10-30
Payer: COMMERCIAL

## 2023-10-30 VITALS
HEIGHT: 70 IN | OXYGEN SATURATION: 99 % | DIASTOLIC BLOOD PRESSURE: 76 MMHG | WEIGHT: 233 LBS | HEART RATE: 73 BPM | SYSTOLIC BLOOD PRESSURE: 122 MMHG | BODY MASS INDEX: 33.36 KG/M2

## 2023-10-30 DIAGNOSIS — R05.8 POST-VIRAL COUGH SYNDROME: Primary | ICD-10-CM

## 2023-10-30 PROCEDURE — 99213 OFFICE O/P EST LOW 20 MIN: CPT | Performed by: INTERNAL MEDICINE

## 2023-10-30 RX ORDER — GUAIFENESIN AND CODEINE PHOSPHATE 100; 10 MG/5ML; MG/5ML
5 SOLUTION ORAL
Qty: 118 ML | Refills: 0 | Status: SHIPPED | OUTPATIENT
Start: 2023-10-30

## 2023-10-30 NOTE — PROGRESS NOTES
Name: Ruslan Saenz      : 1970      MRN: [id number]  Encounter Provider: Maxwell Waters MD  Encounter Date: 10/30/2023   Encounter department: [facility]     1. Post-viral cough syndrome  -     guaifenesin-codeine (GUAIFENESIN AC) 100-10 MG/5ML liquid; Take 5 mL by mouth daily at bedtime as needed for cough    Suspect post infectious cough  Discussed continued symptom directed treatment and that antibiotics are not necessary at this time. If fever or worsening cough develop, he must call and a CXR will be ordered  Call also if symptoms do not continue to improve in the next week        Subjective      Colds and cough for 6 weeks. He was seen at urgent care 2-3 weeks after symptoms started and placed on Augmentin. COVID negative. He has improved but still with a cough at night and in the morning. This interrupts sleep. He is taking OTC meds as needed. He has no fever, chills, SOB, chest tightness or wheezing. Review of Systems   Constitutional:  Negative for chills and fever. HENT:  Positive for congestion and sore throat. Respiratory:  Positive for cough. Negative for chest tightness, shortness of breath and wheezing. Gastrointestinal:  Negative for diarrhea, nausea and vomiting. Musculoskeletal:  Negative for myalgias. Current Outpatient Medications on File Prior to Visit   Medication Sig   • lisinopril (ZESTRIL) 40 mg tablet Take 1 tablet (40 mg total) by mouth daily   • Diclofenac Sodium (VOLTAREN) 1 % Apply 2 g topically in the morning and 2 g at noon and 2 g in the evening and 2 g before bedtime. (Patient not taking: Reported on 2022)       Objective     /76 (BP Location: Left arm, Patient Position: Sitting, Cuff Size: Large)   Pulse 73   Ht 5' 10" (1.778 m)   Wt 106 kg (233 lb)   SpO2 99%   BMI 33.43 kg/m²     Physical Exam  Constitutional:       Appearance: He is well-developed. He is not ill-appearing.    Eyes: Conjunctiva/sclera: Conjunctivae normal.   Cardiovascular:      Rate and Rhythm: Normal rate and regular rhythm. Heart sounds: Normal heart sounds. No murmur heard. Pulmonary:      Effort: Pulmonary effort is normal. No respiratory distress. Breath sounds: Normal breath sounds. No wheezing or rales. Musculoskeletal:      Cervical back: Neck supple. Right lower leg: No edema. Left lower leg: No edema. Neurological:      Mental Status: He is alert and oriented to person, place, and time. Psychiatric:         Mood and Affect: Mood normal.         Behavior: Behavior normal.         Thought Content:  Thought content normal.         Judgment: Judgment normal.       Maycol Flynn MD

## 2023-11-10 ENCOUNTER — TELEPHONE (OUTPATIENT)
Dept: NEPHROLOGY | Facility: CLINIC | Age: 53
End: 2023-11-10

## 2023-11-10 DIAGNOSIS — I10 ESSENTIAL HYPERTENSION: ICD-10-CM

## 2023-11-10 DIAGNOSIS — N02.2 MEMBRANOUS NEPHROPATHY DETERMINED BY BIOPSY: ICD-10-CM

## 2023-11-10 RX ORDER — HYDROCHLOROTHIAZIDE 12.5 MG/1
12.5 TABLET ORAL DAILY
Qty: 30 TABLET | Refills: 5 | Status: SHIPPED | OUTPATIENT
Start: 2023-11-10 | End: 2024-05-08

## 2023-11-10 RX ORDER — LISINOPRIL 40 MG/1
40 TABLET ORAL DAILY
Qty: 90 TABLET | Refills: 2 | Status: SHIPPED | OUTPATIENT
Start: 2023-11-10 | End: 2024-08-06

## 2023-11-10 NOTE — TELEPHONE ENCOUNTER
Patient called and stated that since changing his lisinopril, his blood pressures have not been improving. They are around 150/100. Patient is wondering what can be done.  Please advise

## 2023-11-10 NOTE — TELEPHONE ENCOUNTER
Please tell patient that will add another BP medication called HCTZ (Rx sent to this pharmacy) on top of max dose Lisinopril. Continue following low salt diet and to contact office after 7-10 days with BP readings.   Thanks,      I cc patient's PCP to keep him updated

## 2023-11-15 ENCOUNTER — HOSPITAL ENCOUNTER (EMERGENCY)
Facility: HOSPITAL | Age: 53
Discharge: HOME | End: 2023-11-15
Attending: EMERGENCY MEDICINE
Payer: COMMERCIAL

## 2023-11-15 ENCOUNTER — APPOINTMENT (EMERGENCY)
Dept: VASCULAR ULTRASOUND | Facility: HOSPITAL | Age: 53
End: 2023-11-15
Payer: COMMERCIAL

## 2023-11-15 VITALS
OXYGEN SATURATION: 100 % | SYSTOLIC BLOOD PRESSURE: 142 MMHG | WEIGHT: 233.69 LBS | DIASTOLIC BLOOD PRESSURE: 76 MMHG | HEART RATE: 78 BPM | BODY MASS INDEX: 33.46 KG/M2 | TEMPERATURE: 99.2 F | RESPIRATION RATE: 17 BRPM | HEIGHT: 70 IN

## 2023-11-15 DIAGNOSIS — M79.605 LEFT LEG PAIN: Primary | ICD-10-CM

## 2023-11-15 PROCEDURE — 99285 EMERGENCY DEPT VISIT HI MDM: CPT | Performed by: EMERGENCY MEDICINE

## 2023-11-15 PROCEDURE — 93971 EXTREMITY STUDY: CPT | Performed by: SURGERY

## 2023-11-15 PROCEDURE — 93971 EXTREMITY STUDY: CPT

## 2023-11-15 PROCEDURE — 99284 EMERGENCY DEPT VISIT MOD MDM: CPT

## 2023-11-15 RX ORDER — ACETAMINOPHEN 325 MG/1
650 TABLET ORAL ONCE
Status: CANCELLED | OUTPATIENT
Start: 2023-11-15 | End: 2023-11-15

## 2023-11-15 NOTE — ED PROVIDER NOTES
History  Chief Complaint   Patient presents with    Leg Pain     Pt reports left calf pain that began this afternoon. Pt concerned for blood clot, Hx of PE no longer on thinners     [place] Tran is a 48 y.o. male with history of DVT, pulmonary embolism not on anticoagulation, hypertension presenting for sudden onset left lower extremity pain. Patient reports that he was standing talking to a colleage when he had sudden sharp 5/10 pain in his left calf. He was feeling at his baseline prior, denies any immediately precipitating symptoms, though reports mild bilateral knee ache earlier in the day that resolved. Denies feeling exactly like this before. Patient reports a history of PE, as well as DVT, as well as other extensive clotting, and due to concern for this he called EMS and presented to the ED. Reports that nothing made the pain better. Patient denies other symptoms including shortness of breath, palpitations, chest pain, abdominal pain, back pain, lightheadedness, dizziness. Denies recent illnesses. Denies recent cancer treatment, reports he is active on his feet, denies recent illness. Reports normal sleep and normal appetite. Ambulating well at his baseline. The following portions of the patient's history were reviewed and updated as appropriate: allergies, current medications, past family history, past medical history, past social history, past surgical history, and problem list.    History collected from patient and wife, translation services not necessary. Prior to Admission Medications   Prescriptions Last Dose Informant Patient Reported? Taking? Diclofenac Sodium (VOLTAREN) 1 % Not Taking  No No   Sig: Apply 2 g topically in the morning and 2 g at noon and 2 g in the evening and 2 g before bedtime.    Patient not taking: Reported on 6/24/2022   guaifenesin-codeine (GUAIFENESIN AC) 100-10 MG/5ML liquid Not Taking  No No   Sig: Take 5 mL by mouth daily at bedtime as needed for cough   Patient not taking: Reported on 11/15/2023   hydrochlorothiazide (HYDRODIURIL) 12.5 mg tablet 11/15/2023  No Yes   Sig: Take 1 tablet (12.5 mg total) by mouth daily   lisinopril (ZESTRIL) 40 mg tablet 11/15/2023  No Yes   Sig: Take 1 tablet (40 mg total) by mouth daily      Facility-Administered Medications: None       Past Medical History:   Diagnosis Date    DVT (deep venous thrombosis) (HCC)     Dyslipidemia     Hemorrhoids     [place] (hard of hearing)     Hypertension     Membranous glomerulonephritis     Proteinuria     Pulmonary embolus (HCC)     Renal vein thrombosis ([address])        Past Surgical History:   Procedure Laterality Date    ADENOIDECTOMY      HEMORRHOID SURGERY      VASECTOMY         Family History   Problem Relation Age of Onset    Colon polyps Mother     Heart disease Father     Hypertension Father     Coronary artery disease Father     Nephrolithiasis Father     No Known Problems Sister     No Known Problems Brother     Diabetes Paternal Grandfather     Stroke Neg Hx     Thyroid disease Neg Hx      I have reviewed and agree with the history as documented. E-Cigarette/Vaping    E-Cigarette Use Never User      E-Cigarette/Vaping Substances    Nicotine No     THC No     CBD No     Flavoring No     Other No     Unknown No      Social History     Tobacco Use    Smoking status: Never    Smokeless tobacco: Never   Vaping Use    Vaping Use: Never used   Substance Use Topics    Alcohol use: Yes     Comment: socialy1 beer every 2 months    Drug use: No        Review of Systems   Constitutional:  Negative for chills and fever. HENT:  Negative for congestion, rhinorrhea and sore throat. Eyes:  Negative for pain and visual disturbance. Respiratory:  Negative for cough, chest tightness, shortness of breath, wheezing and stridor. Cardiovascular:  Negative for chest pain, palpitations and leg swelling. Gastrointestinal:  Negative for abdominal pain, constipation, diarrhea, nausea and vomiting. Genitourinary:  Negative for dysuria and hematuria. Musculoskeletal:  Positive for myalgias (left calf pain 5/10). Negative for arthralgias and back pain. Skin:  Negative for color change, pallor and rash. Neurological:  Negative for dizziness, syncope, light-headedness, numbness and headaches. Psychiatric/Behavioral:  Negative for behavioral problems. All other systems reviewed and are negative. Physical Exam  ED Triage Vitals [11/15/23 1530]   Temperature Pulse Respirations Blood Pressure SpO2   99.2 °F (37.3 °C) 88 16 139/88 99 %      Temp Source Heart Rate Source Patient Position - Orthostatic VS BP Location FiO2 (%)   Oral Monitor Lying Right arm --      Pain Score       6             Orthostatic Vital Signs  Vitals:    11/15/23 1530 11/15/23 1630   BP: 139/88 142/76   Pulse: 88 78   Patient Position - Orthostatic VS: Lying        Physical Exam  Vitals and nursing note reviewed. Constitutional:       General: He is not in acute distress. Appearance: Normal appearance. He is well-developed. He is not ill-appearing, toxic-appearing or diaphoretic. HENT:      Head: Normocephalic and atraumatic. Nose: No rhinorrhea. Mouth/Throat:      Mouth: Mucous membranes are moist.      Pharynx: Oropharynx is clear. Eyes:      Extraocular Movements: Extraocular movements intact. Conjunctiva/sclera: Conjunctivae normal.   Cardiovascular:      Rate and Rhythm: Normal rate and regular rhythm. Pulses: Normal pulses. Radial pulses are 2+ on the right side and 2+ on the left side. Dorsalis pedis pulses are 2+ on the right side and 2+ on the left side. Posterior tibial pulses are 2+ on the right side and 2+ on the left side. Heart sounds: Normal heart sounds. No murmur heard. Pulmonary:      Effort: Pulmonary effort is normal. No respiratory distress. Breath sounds: Normal breath sounds. No wheezing, rhonchi or rales.    Abdominal:      General: Abdomen is flat. Bowel sounds are normal. There is no distension. Palpations: Abdomen is soft. Tenderness: There is no abdominal tenderness. There is no guarding. Musculoskeletal:         General: No swelling or tenderness. Cervical back: Neck supple. Right lower leg: Normal. No swelling, deformity, lacerations, tenderness or bony tenderness. No edema. Left lower leg: Normal. No swelling, deformity, lacerations, tenderness or bony tenderness. No edema. Comments: No tenderness to palpation of left lower extremity. No erythema, pallor, ecchymosis. No size discrepancy. Sensation intact bilaterally. DP and PT pulses intact bilaterally. Skin:     General: Skin is warm and dry. Capillary Refill: Capillary refill takes less than 2 seconds. Coloration: Skin is not pale. Findings: No bruising, erythema or rash. Neurological:      General: No focal deficit present. Mental Status: He is alert and oriented to person, place, and time. Gait: Abnormal gait: limp. Psychiatric:         Mood and Affect: Mood normal.         Behavior: Behavior normal.         ED Medications  Medications - No data to display    Diagnostic Studies  Results Reviewed       None                   VAS lower limb venous duplex study, unilateral/limited   Final Result by Sharlene Celestin MD ([id number])            Procedures  Procedures      ED Course  ED Course as of 11/16/23 2023   Wed Nov 15, 2023   136 22-year-old male with history of DVT, pulmonary embolism, hypertension referred for colonoscopy. 1538 Blood Pressure: 139/88  139/88, HR 88, RR 16, 99.2 F, SpO2 99% RA   1602 Seen and examined at bedside. Patient reports that he was standing talking to a colleage when he had sudden sharp 5/10 pain in his left calf. He was feeling at his baseline prior, denies any immediately precipitating symptoms, though reports mild bilateral knee ache earlier in the day that resolved.   Denies feeling exactly like this before. Patient reports a history of PE, as well as DVT, as well as other extensive clotting, and due to concern for this he called EMS and presented to the ED. Reports that nothing made the pain better. Patient also denies associated symptoms. He denies shortness of breath, palpitations, chest pain, abdominal pain, back pain, lightheadedness, dizziness. On exam heart is regular in rate and rhythm, lungs are clear bilaterally. Abdomen is soft, nondistended, nontender. Strength is equal throughout. No peripheral edema, no erythema in bilateral lower extremities. Lower extremities appear similar in size, patient reports left is not bigger than the right. No tenderness to palpation in bilateral lower extremities. Homans negative. DP and PT pulses intact bilaterally. 1610 Given patient history, will get Duplex of LLE.   1651 Per vascular - No acute DVT   1706 Re-evaluated patient at bedside. Denies new symptoms. Discussed results of Duplex with patient. Discussed strict return precautions. Discussed importance of follow-up with PCP. All questions answered. Patient agreeable to discharge. SBIRT 22yo+      Flowsheet Row Most Recent Value   Initial Alcohol Screen: US AUDIT-C     1. How often do you have a drink containing alcohol? 0 Filed at: 11/15/2023 1530   2. How many drinks containing alcohol do you have on a typical day you are drinking? 0 Filed at: 11/15/2023 1530   3a. Male UNDER 65: How often do you have five or more drinks on one occasion? 0 Filed at: 11/15/2023 1530   3b. FEMALE Any Age, or MALE 65+: How often do you have 4 or more drinks on one occassion? 0 Filed at: 11/15/2023 1530   Audit-C Score 0 Filed at: 11/15/2023 1530   MIKKI: How many times in the past year have you. .. Used an illegal drug or used a prescription medication for non-medical reasons?  Never Filed at: 11/15/2023 1530                  Medical Decision Making  Initial ED assessment:  48 y.o. male with history of DVT, pulmonary embolism not on anticoagulation, hypertension presenting for sudden sharp 5/10 pain in his left calf. Denies any immediately precipitating symptoms. Reports that nothing made the pain better. Patient denies shortness of breath, palpitations, chest pain, abdominal pain, back pain, lightheadedness, dizziness. Denies recent illnesses. Denies recent cancer treatment, reports he is active on his feet, denies recent illness. Afebrile, vital signs within normal limits  On exam heart is regular in rate and rhythm, lungs are clear bilaterally. Abdomen is soft, nondistended, nontender. Strength is equal throughout. No peripheral edema, no erythema in bilateral lower extremities. Lower extremities appear similar in size, patient reports left is not bigger than the right. No tenderness to palpation in bilateral lower extremities. Sensation intact bilaterally. DP and PT pulses intact bilaterally. Homans negative bilaterally. Initial DDx includes but is not limited to: With history of DVT, increased risk of DVT, will get duplex of left lower extremity. Other possible include sprain, strain, rhabdomyolysis, myositis, fracture, contusion, metabolic abnormality, radiculopathy    Updates:    See ED Course    Final ED summary/disposition: After evaluation and workup in the emergency department, patient appears well, is nontoxic appearing, expresses understanding and agrees with plan of care at this time. In light of this patient would benefit from outpatient management. Discussed results of Duplex with patient. Discussed strict return precautions. Discussed importance of follow-up with PCP. All questions answered. Patient agreeable to discharge.           Disposition  Final diagnoses:   Left leg pain     Time reflects when diagnosis was documented in both MDM as applicable and the Disposition within this note       Time User Action Codes Description Comment 11/15/2023  4:52 PM Tom Black Add [M79.605] Left leg pain           ED Disposition       ED Disposition   Discharge    Condition   Stable    Date/Time   Wed Nov 15, 2023 1652    [id number] [facility] discharge to home/self care. Follow-up Information       Follow up With Specialties Details Why Contact Info Additional [address], DO Internal Medicine Schedule an appointment as soon as possible for a visit in 2 days Re-evaluation of symptoms [address] [facility] [address]        [address] [facility] Emergency Department Emergency Medicine Go to  As needed, If symptoms worsen [address] [facility] Emergency Department, [place], [place], [address]            Discharge Medication List as of 11/15/2023  4:57 PM        CONTINUE these medications which have NOT CHANGED    Details   hydrochlorothiazide (HYDRODIURIL) 12.5 mg tablet Take 1 tablet (12.5 mg total) by mouth daily, Starting Fri 11/10/2023, Until Wed 5/8/2024, Normal      lisinopril (ZESTRIL) 40 mg tablet Take 1 tablet (40 mg total) by mouth daily, Starting Fri 11/10/2023, Until Tue 8/6/2024, Normal      Diclofenac Sodium (VOLTAREN) 1 % Apply 2 g topically in the morning and 2 g at noon and 2 g in the evening and 2 g before bedtime. , Starting Fri 5/13/2022, Normal      guaifenesin-codeine (GUAIFENESIN AC) 100-10 MG/5ML liquid Take 5 mL by mouth daily at bedtime as needed for cough, Starting Mon 10/30/2023, Normal           No discharge procedures on file. PDMP Review         Value Time User    PDMP Reviewed  Yes 10/30/2023  2:59 PM Carloyn Halsted, MD             ED Provider  Attending physically available and evaluated Ali Montserrat. I managed the patient along with the ED Attending.     Electronically Signed by           Tom Black MD  11/16/23 2023

## 2023-11-15 NOTE — DISCHARGE INSTRUCTIONS
You were evaluated in the emergency department for: left leg pain. You can access your current and pending results through [address] [place] [address] Diana. A radiologist will take a second look at your X-Rays, if you had any, and you will be contacted with any new findings. You should follow-up with your primary care provider, as soon as possible, for re-evaluation. Please, return to the ED if you feel lightheaded, short of breath, have chest pain, cough up blood, your leg feels warm, increased tenderness, you have numbness or tingling in your leg or toes, ou cannot put any weight on or move your leg.

## 2023-11-17 ENCOUNTER — TELEPHONE (OUTPATIENT)
Dept: NEPHROLOGY | Facility: CLINIC | Age: 53
End: 2023-11-17

## 2023-11-17 NOTE — TELEPHONE ENCOUNTER
Patient called and wanted Dr. Sandra Tubbs to be aware that he had gone to to ER for lower leg pain.

## 2023-11-17 NOTE — ED ATTENDING ATTESTATION
11/15/2023  I, Gus Foster MD, saw and evaluated the patient. I have discussed the patient with the resident/non-physician practitioner and agree with the resident's/non-physician practitioner's findings, Plan of Care, and MDM as documented in the resident's/non-physician practitioner's note, except where noted. All available labs and Radiology studies were reviewed. I was present for key portions of any procedure(s) performed by the resident/non-physician practitioner and I was immediately available to provide assistance. At this point I agree with the current assessment done in the Emergency Department.   I have conducted an independent evaluation of this patient a history and physical is as follows:see h and p above     ED Course         Critical Care Time  Procedures

## 2023-12-04 ENCOUNTER — TELEPHONE (OUTPATIENT)
Dept: NEPHROLOGY | Facility: CLINIC | Age: 53
End: 2023-12-04

## 2023-12-04 NOTE — TELEPHONE ENCOUNTER
Called patient reminding to please complete labwork prior to 12/14 appointment with Dr. Annie Santoyo.

## 2023-12-06 ENCOUNTER — RA CDI HCC (OUTPATIENT)
Dept: OTHER | Facility: HOSPITAL | Age: 53
End: 2023-12-06

## 2023-12-06 NOTE — PROGRESS NOTES
[facility] [address] [facility] coding opportunities       Chart reviewed, no opportunity found: CHART REVIEWED, NO OPPORTUNITY FOUND        Patients Insurance        Commercial Insurance: Olvin Monsivais

## 2023-12-07 DIAGNOSIS — I10 ESSENTIAL HYPERTENSION: ICD-10-CM

## 2023-12-07 RX ORDER — HYDROCHLOROTHIAZIDE 12.5 MG/1
12.5 TABLET ORAL DAILY
Qty: 30 TABLET | Refills: 5 | Status: SHIPPED | OUTPATIENT
Start: 2023-12-07 | End: 2024-06-04

## 2023-12-07 NOTE — TELEPHONE ENCOUNTER
Patient is aware and requested a refill medication of hydrochlorothiazide 12.5 mg, take 1 tablet daily. Mo further questions at this time.

## 2023-12-07 NOTE — TELEPHONE ENCOUNTER
Patient called to report blood pressure readings and would like 's thoughts.   11/14 115/77  11/17 113/75  11/20 119/79  11/22 114/82  11/28 123/83  12/04 129/88  12/06 117/83   Average 119/81

## 2023-12-09 ENCOUNTER — APPOINTMENT (OUTPATIENT)
Dept: LAB | Facility: CLINIC | Age: 53
End: 2023-12-09
Payer: COMMERCIAL

## 2023-12-09 DIAGNOSIS — I10 ESSENTIAL HYPERTENSION: ICD-10-CM

## 2023-12-09 DIAGNOSIS — N18.31 STAGE 3A CHRONIC KIDNEY DISEASE (HCC): ICD-10-CM

## 2023-12-09 DIAGNOSIS — N02.2 MEMBRANOUS NEPHROPATHY DETERMINED BY BIOPSY: ICD-10-CM

## 2023-12-09 LAB
ALBUMIN SERPL BCP-MCNC: 3.9 G/DL (ref 3.5–5)
ANION GAP SERPL CALCULATED.3IONS-SCNC: 6 MMOL/L
BUN SERPL-MCNC: 27 MG/DL (ref 5–25)
CALCIUM SERPL-MCNC: 9.2 MG/DL (ref 8.4–10.2)
CHLORIDE SERPL-SCNC: 105 MMOL/L (ref 96–108)
CO2 SERPL-SCNC: 26 MMOL/L (ref 21–32)
CREAT SERPL-MCNC: 1.4 MG/DL (ref 0.6–1.3)
CREAT UR-MCNC: 85.9 MG/DL
GFR SERPL CREATININE-BSD FRML MDRD: 56 ML/MIN/1.73SQ M
GLUCOSE P FAST SERPL-MCNC: 85 MG/DL (ref 65–99)
PHOSPHATE SERPL-MCNC: 3.1 MG/DL (ref 2.7–4.5)
POTASSIUM SERPL-SCNC: 4.1 MMOL/L (ref 3.5–5.3)
PROT UR-MCNC: 41 MG/DL
PROT/CREAT UR: 0.48 MG/G{CREAT} (ref 0–0.1)
SODIUM SERPL-SCNC: 137 MMOL/L (ref 135–147)

## 2023-12-09 PROCEDURE — 82570 ASSAY OF URINE CREATININE: CPT

## 2023-12-09 PROCEDURE — 84156 ASSAY OF PROTEIN URINE: CPT

## 2023-12-09 PROCEDURE — 36415 COLL VENOUS BLD VENIPUNCTURE: CPT

## 2023-12-09 PROCEDURE — 80069 RENAL FUNCTION PANEL: CPT

## 2023-12-14 ENCOUNTER — OFFICE VISIT (OUTPATIENT)
Dept: NEPHROLOGY | Facility: CLINIC | Age: 53
End: 2023-12-14
Payer: COMMERCIAL

## 2023-12-14 VITALS
DIASTOLIC BLOOD PRESSURE: 78 MMHG | HEART RATE: 64 BPM | HEIGHT: 70 IN | BODY MASS INDEX: 32.93 KG/M2 | OXYGEN SATURATION: 98 % | WEIGHT: 230 LBS | SYSTOLIC BLOOD PRESSURE: 120 MMHG

## 2023-12-14 DIAGNOSIS — E66.9 OBESITY, CLASS II, BMI 35-39.9: ICD-10-CM

## 2023-12-14 DIAGNOSIS — I10 ESSENTIAL HYPERTENSION: ICD-10-CM

## 2023-12-14 DIAGNOSIS — N02.2 MEMBRANOUS NEPHROPATHY DETERMINED BY BIOPSY: Primary | ICD-10-CM

## 2023-12-14 DIAGNOSIS — N18.31 STAGE 3A CHRONIC KIDNEY DISEASE (HCC): ICD-10-CM

## 2023-12-14 PROCEDURE — 99214 OFFICE O/P EST MOD 30 MIN: CPT | Performed by: INTERNAL MEDICINE

## 2023-12-14 NOTE — PROGRESS NOTES
OFFICE FOLLOW UP - Nephrology   Deep Kaufman 48 y.o. male MRN: [id number]       ASSESSMENT and PLAN:  Jozef Davies was seen today for follow-up and chronic kidney disease. Diagnoses and all orders for this visit:    Membranous nephropathy determined by biopsy  -     Renal function panel; Future  -     Protein / creatinine ratio, urine; Future    Stage 3a chronic kidney disease ([address])  -     Renal function panel; Future  -     Protein / creatinine ratio, urine; Future    Essential hypertension  -     Renal function panel; Future  -     Protein / creatinine ratio, urine; Future    Obesity, Class II, BMI 35-39.9        This is 48years-old gentleman with history of chronic kidney disease stage 2/3 secondary to biopsy-proven idiopathic membranous glomerulonephritis, kidney bx on 04/2013, previously treated with prednisone and tacrolimus, he has been off steroids since June 2015, off tacrolimus since October 2016, Jozef Davies returns today to the office for year follow-up with his wife. 1.  Chronic kidney disease stage 2/3A. Recent blood and urine test were reviewed with patient, creatinine 1.40, normal albumin, UPC 0.48. Previous creatinine around 1.3 to 1.5 since 2014  Pressure today better controlled, patient brought his home blood pressure readings on average she is also better controlled with systolics in the 929R to 974J. For now continue with lisinopril 40 mg daily and HCTZ 12.5 mg daily. Advise to continue working on losing weight, noted he lost 9 pounds over the last 4 months  Plan to follow labs in 6 months, we will contact him with the results. I would like to see him back in the office in 1 year    2. Hypertension, pressure lately better controlled on current regimen. No changes in his medication. Follow a low-salt diet and keep working on losing weight. Vies to keep checking blood pressure at home regularly, he will contact us if his blood pressure is elevated    3.   Biopsy proving idiopathic membranous glomerulonephritis, biopsy done on 04/2013, previously treated with combination of steroids and tacrolimus, off immunosuppressive Rx since October 2016. Recent UPC 0.48. Currently on max dose ACE inhibitor    4. Hemoglobin normal at 15.3, follow labs in 1 year. 5.  Obesity, advised to lose weight. He lost 7 pounds since last office visit        Patient Instructions   As we discussed in the office visit and after reviewing your most recent blood and urine test your kidney function remains a stable and the protein leaking remains well-controlled. After reviewing your most recent home blood pressure readings, your blood pressure is overall better controlled. Continue with current regimen with lisinopril 40 mg daily and hydrochlorothiazide 12.5 mg 1 tablet daily. Remember to follow low-salt diet less than 2 g of sodium a day. Keep working on losing weight. Stay physically active and stay well-hydrated. With NSAIDs (no ibuprofen, Motrin, Advil, Aleve, naproxen). Okay to take Tylenol or acetaminophen as needed for pain. I would like you to have repeat blood and urine test in 6 months, we will contact you with that result. If your kidney function remains stable I would like to see you back in the office in 1 year. Continue close follow-up with your primary care doctor. HPI: Feliz Martinez is a 48 y.o. male who is here for Follow-up and Chronic Kidney Disease    Patient with known history of chronic kidney disease secondary to biopsy-proven membranous nephropathy done on 04/2013. Last time patient was seen was on 08/2023. Today returns for follow-up. .    Since our last visit, he went to the emergency department on 11/17/2023 due to left calf pain, workup was unremarkable. Due to previously elevated blood pressure lisinopril was increased to 40 mg and HCTZ low-dose was added.   Patient brought today his home blood pressure readings and average it is better control with a systolic in the 722G to 130s  Patient denies any chest pain, shortness of breath or leg swelling. Denies any urinary problems, no significant foamy urine. Denies any abdominal pain, no nausea, no vomiting, no diarrhea constipation. Denies NSAID use    The last blood work was done on 12/09/2023, which we have reviewed together. Recent creatinine 1.40 estimated GFR of 6656 serum albumin 3.9, most recent UPC 0.48      ROS: All the systems were reviewed and were negative except as documented on the H&P. Allergies: Molds & smuts    Medications:   Current Outpatient Medications:     hydrochlorothiazide (HYDRODIURIL) 12.5 mg tablet, Take 1 tablet (12.5 mg total) by mouth daily, Disp: 30 tablet, Rfl: 5    lisinopril (ZESTRIL) 40 mg tablet, Take 1 tablet (40 mg total) by mouth daily, Disp: 90 tablet, Rfl: 2    Diclofenac Sodium (VOLTAREN) 1 %, Apply 2 g topically in the morning and 2 g at noon and 2 g in the evening and 2 g before bedtime. (Patient not taking: Reported on 6/24/2022), Disp: 50 g, Rfl: 3    guaifenesin-codeine (GUAIFENESIN AC) 100-10 MG/5ML liquid, Take 5 mL by mouth daily at bedtime as needed for cough (Patient not taking: Reported on 11/15/2023), Disp: 118 mL, Rfl: 0    Past Medical History:   Diagnosis Date    DVT (deep venous thrombosis) (HCC)     Dyslipidemia     Hemorrhoids     [place] (hard of hearing)     Hypertension     Membranous glomerulonephritis     Proteinuria     Pulmonary embolus (HCC)     Renal vein thrombosis ([address])      Past Surgical History:   Procedure Laterality Date    ADENOIDECTOMY      HEMORRHOID SURGERY      VASECTOMY       Family History   Problem Relation Age of Onset    Colon polyps Mother     Heart disease Father     Hypertension Father     Coronary artery disease Father     Nephrolithiasis Father     No Known Problems Sister     No Known Problems Brother     Diabetes Paternal Grandfather     Stroke Neg Hx     Thyroid disease Neg Hx       reports that he has never smoked.  He has never used smokeless tobacco. He reports current alcohol use. He reports that he does not use drugs. Physical Exam:   Vitals:    12/14/23 1424   BP: 120/78   BP Location: Left arm   Patient Position: Sitting   Cuff Size: Adult   Pulse: 64   SpO2: 98%   Weight: 104 kg (230 lb)   Height: 5' 10" (1.778 m)     Body mass index is 33 kg/m². General: conscious, cooperative, in not acute distress  Eyes: conjunctivae pink, anicteric sclerae  ENT: lips and mucous membranes moist  Neck: supple, no JVD  Chest: clear breath sounds bilateral, no crackles, ronchus or wheezings  CVS: distinct S1 & S2, normal rate, regular rhythm  Abdomen: soft, non-tender, non-distended, normoactive bowel sounds  Back: no CVA tenderness  Extremities: no edema of both legs  Skin: no rash  Neuro: awake, alert, oriented          Laboratory Results:  Lab Results   Component Value Date    WBC 5.57 08/01/2023    HGB 15.3 08/01/2023    HCT 45.5 08/01/2023    MCV 93 08/01/2023     08/01/2023     Lab Results   Component Value Date    SODIUM 137 12/09/2023    K 4.1 12/09/2023     12/09/2023    CO2 26 12/09/2023    BUN 27 (H) 12/09/2023    CREATININE 1.40 (H) 12/09/2023    GLUC 80 05/19/2021    CALCIUM 9.2 12/09/2023       Lab Results   Component Value Date    PTH 69.5 08/01/2023    CALCIUM 9.2 12/09/2023    PHOS 3.1 12/09/2023         Portions of the record may have been created with voice recognition software. Occasional wrong word or "sound a like" substitutions may have occurred due to the inherent limitations of voice recognition software. Read the chart carefully and recognize, using context, where substitutions have occurred. If you have any questions, please contact the dictating provider.

## 2023-12-14 NOTE — PATIENT INSTRUCTIONS
As we discussed in the office visit and after reviewing your most recent blood and urine test your kidney function remains a stable and the protein leaking remains well-controlled. After reviewing your most recent home blood pressure readings, your blood pressure is overall better controlled. Continue with current regimen with lisinopril 40 mg daily and hydrochlorothiazide 12.5 mg 1 tablet daily. Remember to follow low-salt diet less than 2 g of sodium a day. Keep working on losing weight. Stay physically active and stay well-hydrated. With NSAIDs (no ibuprofen, Motrin, Advil, Aleve, naproxen). Okay to take Tylenol or acetaminophen as needed for pain. I would like you to have repeat blood and urine test in 6 months, we will contact you with that result. If your kidney function remains stable I would like to see you back in the office in 1 year. Continue close follow-up with your primary care doctor.

## 2023-12-19 ENCOUNTER — OFFICE VISIT (OUTPATIENT)
Dept: INTERNAL MEDICINE CLINIC | Facility: CLINIC | Age: 53
End: 2023-12-19
Payer: COMMERCIAL

## 2023-12-19 VITALS
SYSTOLIC BLOOD PRESSURE: 120 MMHG | HEIGHT: 70 IN | HEART RATE: 83 BPM | BODY MASS INDEX: 33.3 KG/M2 | TEMPERATURE: 97.3 F | WEIGHT: 232.6 LBS | OXYGEN SATURATION: 98 % | DIASTOLIC BLOOD PRESSURE: 84 MMHG

## 2023-12-19 DIAGNOSIS — R05.3 CHRONIC COUGH: Primary | ICD-10-CM

## 2023-12-19 PROCEDURE — 99214 OFFICE O/P EST MOD 30 MIN: CPT

## 2023-12-19 RX ORDER — ALBUTEROL SULFATE 90 UG/1
2 AEROSOL, METERED RESPIRATORY (INHALATION) EVERY 6 HOURS PRN
Qty: 8.5 G | Refills: 0 | Status: SHIPPED | OUTPATIENT
Start: 2023-12-19

## 2023-12-19 RX ORDER — OMEPRAZOLE 20 MG/1
20 TABLET, DELAYED RELEASE ORAL DAILY
Qty: 90 TABLET | Refills: 0 | Status: SHIPPED | OUTPATIENT
Start: 2023-12-19

## 2023-12-19 NOTE — ASSESSMENT & PLAN NOTE
Patient has been experiencing chronic cough for the past 3 months  Cough is productive and nonproductive in nature, clear in color  Patient was seen by Dr. Reyes 2 months ago, was prescribed codeine which led to improvement of the symptoms  However patient did leaf recking slight worsening of the symptoms  Patient describes the cough as a ticklish sensation in back of her throat  Exercise makes the cough worse  Cough is worse in the morning  Patient also has history of heartburn at night  Differentials include postviral bronchitis versus GERD versus lisinopril side effect    PLAN:  Chest x-ray  Albuterol  Omeprazole 20 Mg once daily

## 2023-12-20 LAB — SCAN RESULT: NORMAL

## 2023-12-26 ENCOUNTER — HOSPITAL ENCOUNTER (OUTPATIENT)
Dept: RADIOLOGY | Facility: HOSPITAL | Age: 53
Discharge: HOME | End: 2023-12-26
Payer: COMMERCIAL

## 2023-12-26 DIAGNOSIS — I10 ESSENTIAL HYPERTENSION: ICD-10-CM

## 2023-12-26 DIAGNOSIS — R05.3 CHRONIC COUGH: ICD-10-CM

## 2023-12-26 DIAGNOSIS — N02.2 MEMBRANOUS NEPHROPATHY DETERMINED BY BIOPSY: ICD-10-CM

## 2023-12-26 PROCEDURE — 71046 X-RAY EXAM CHEST 2 VIEWS: CPT

## 2023-12-26 RX ORDER — LISINOPRIL 40 MG/1
40 TABLET ORAL DAILY
Qty: 14 TABLET | Refills: 0 | Status: SHIPPED | OUTPATIENT
Start: 2023-12-26 | End: 2024-09-21

## 2023-12-27 ENCOUNTER — VBI (OUTPATIENT)
Dept: ADMINISTRATIVE | Facility: OTHER | Age: 53
End: 2023-12-27

## 2024-01-11 DIAGNOSIS — N02.2 MEMBRANOUS NEPHROPATHY DETERMINED BY BIOPSY: ICD-10-CM

## 2024-01-11 DIAGNOSIS — I10 ESSENTIAL HYPERTENSION: ICD-10-CM

## 2024-01-11 RX ORDER — LISINOPRIL 40 MG/1
40 TABLET ORAL DAILY
Qty: 14 TABLET | Refills: 0 | Status: SHIPPED | OUTPATIENT
Start: 2024-01-11 | End: 2024-01-15

## 2024-01-11 RX ORDER — LISINOPRIL 40 MG/1
40 TABLET ORAL DAILY
Qty: 90 TABLET | Refills: 3 | Status: SHIPPED | OUTPATIENT
Start: 2024-01-11 | End: 2024-01-15

## 2024-01-12 ENCOUNTER — RA CDI HCC (OUTPATIENT)
Dept: OTHER | Facility: HOSPITAL | Age: 54
End: 2024-01-12

## 2024-01-15 ENCOUNTER — OFFICE VISIT (OUTPATIENT)
Dept: INTERNAL MEDICINE CLINIC | Facility: CLINIC | Age: 54
End: 2024-01-15
Payer: COMMERCIAL

## 2024-01-15 VITALS
DIASTOLIC BLOOD PRESSURE: 84 MMHG | OXYGEN SATURATION: 98 % | HEART RATE: 75 BPM | BODY MASS INDEX: 33.58 KG/M2 | WEIGHT: 234 LBS | SYSTOLIC BLOOD PRESSURE: 122 MMHG

## 2024-01-15 DIAGNOSIS — J35.8 TONSIL ASYMMETRY: ICD-10-CM

## 2024-01-15 DIAGNOSIS — N02.2 MEMBRANOUS NEPHROPATHY DETERMINED BY BIOPSY: ICD-10-CM

## 2024-01-15 DIAGNOSIS — I10 ESSENTIAL HYPERTENSION: ICD-10-CM

## 2024-01-15 DIAGNOSIS — R05.3 CHRONIC COUGH: Primary | ICD-10-CM

## 2024-01-15 PROCEDURE — 99213 OFFICE O/P EST LOW 20 MIN: CPT

## 2024-01-15 RX ORDER — LOSARTAN POTASSIUM 50 MG/1
50 TABLET ORAL DAILY
Qty: 30 TABLET | Refills: 0 | Status: SHIPPED | OUTPATIENT
Start: 2024-01-15 | End: 2024-02-14

## 2024-01-15 RX ORDER — FAMOTIDINE 20 MG/1
20 TABLET, FILM COATED ORAL
Qty: 30 TABLET | Refills: 0 | Status: SHIPPED | OUTPATIENT
Start: 2024-01-15 | End: 2024-02-14

## 2024-01-15 RX ORDER — FLUTICASONE PROPIONATE 50 MCG
1 SPRAY, SUSPENSION (ML) NASAL DAILY
Qty: 9.9 ML | Refills: 0 | Status: SHIPPED | OUTPATIENT
Start: 2024-01-15 | End: 2024-02-14

## 2024-01-15 NOTE — PROGRESS NOTES
Assessment/Plan:    Tonsil asymmetry  Left tonsil more predominant than Right  Reported tonsil stone.  Follow up with ENT as outpatient.    Chronic cough  Patient has been experiencing chronic cough since last Oct.  Cough is productive and nonproductive in nature, greenish to clear in color.  Endorsed ticklish sensation of his back throat.  Worsening in the early morning.  On lisinopril since 2013.  History of heartburn, reflux.  Patient was prescribed omeprazole 20 mg which he has never started taking it.  Reported unchanged chronic cough comparing to last office visit.  CXR in December did not reveal acute cardiopulmonary findings.  Felt that this is more secondary to postnasal drip versus GERD.  Less likely secondary to lisinopril.     PLAN:  Flonase nasal spray for postnasal drip.  Pepcid for GERD in the setting of CKD.  Switching lisinopril to losartan as per patient's request.  Patient was advised to follow-up with us in 1 month if worsening symptoms.    Essential hypertension  Well controlled.  Switching patient from lisinopril to losartan as requested.  Continue HCTZ.    Membranous nephropathy determined by biopsy  Drink adequate amount of water, avoid anti-inflammatories, reduce sodium in the diet and will continue to monitor the kidney function continue follow-up with Nephrology reviewed laboratories the patient          Problem List Items Addressed This Visit       Membranous nephropathy determined by biopsy     Drink adequate amount of water, avoid anti-inflammatories, reduce sodium in the diet and will continue to monitor the kidney function continue follow-up with Nephrology reviewed laboratories the patient          Essential hypertension     Well controlled.  Switching patient from lisinopril to losartan as requested.  Continue HCTZ.         Chronic cough - Primary     Patient has been experiencing chronic cough since last Oct.  Cough is productive and nonproductive in nature, greenish to clear in  color.  Endorsed ticklish sensation of his back throat.  Worsening in the early morning.  On lisinopril since 2013.  History of heartburn, reflux.  Patient was prescribed omeprazole 20 mg which he has never started taking it.  Reported unchanged chronic cough comparing to last office visit.  CXR in December did not reveal acute cardiopulmonary findings.  Felt that this is more secondary to postnasal drip versus GERD.  Less likely secondary to lisinopril.     PLAN:  Flonase nasal spray for postnasal drip.  Pepcid for GERD in the setting of CKD.  Switching lisinopril to losartan as per patient's request.  Patient was advised to follow-up with us in 1 month if worsening symptoms.         Tonsil asymmetry     Left tonsil more predominant than Right  Reported tonsil stone.  Follow up with ENT as outpatient.              Subjective:      Patient ID: Jung Huynh is a 53 y.o. male.    Jung Huynh is a 53 y.o. male who has a past medical history of DVT, Dyslipidemia, Hypertension, Membranous glomerulonephritis, Proteinuria, Pulmonary embolus presents in office visit for chronic cough.  Patient has not started taking omeprazole 20 mg p.o. as prescribed.  Stating unchanged chronic cough with greenish mucus.  Endorsed chronic cough was worse in the morning especially woke up.  Also reported tingling sensation in his back throat.  Positive for GERD symptoms, denied sinus issues before.  Patient also denied any recent fever, chills, headache, CP, SOB, nausea, vomiting, abdominal pain.        The following portions of the patient's history were reviewed and updated as appropriate: allergies, current medications, past family history, past medical history, past social history, past surgical history and problem list.    Review of Systems   Constitutional:  Negative for chills and fever.   HENT:  Negative for ear pain, sinus pressure, sinus pain, sore throat and trouble swallowing.    Respiratory:  Positive for cough. Negative  for chest tightness, shortness of breath, wheezing and stridor.    Cardiovascular:  Negative for chest pain, palpitations and leg swelling.   Gastrointestinal:  Negative for abdominal pain, constipation, diarrhea, nausea and vomiting.   Genitourinary:  Negative for difficulty urinating, dysuria and hematuria.   Neurological:  Negative for dizziness, seizures, syncope, facial asymmetry, speech difficulty, light-headedness and headaches.   Psychiatric/Behavioral:  Negative for agitation, behavioral problems, confusion, decreased concentration, dysphoric mood and hallucinations. The patient is not nervous/anxious and is not hyperactive.    All other systems reviewed and are negative.        Objective:    Return in about 4 weeks (around 2/12/2024) for Recheck.    Procedure: XR chest pa & lateral    Result Date: 12/26/2023  Narrative: CHEST INDICATION:   Chronic cough. COMPARISON:  There are no previous examinations available for comparison. EXAM PERFORMED/VIEWS:  XR CHEST PA & LATERAL FINDINGS: Cardiomediastinal silhouette appears unremarkable. The lungs are clear.  No pneumothorax or pleural effusion. Osseous structures appear within normal limits for patient age.     Impression: No acute cardiopulmonary disease. Electronically signed: 12/26/2023 09:22 AM Aldo Hardy MD       Allergies   Allergen Reactions    Molds & Smuts        Past Medical History:   Diagnosis Date    DVT (deep venous thrombosis) (HCC)     Dyslipidemia     Hemorrhoids     [place] (hard of hearing)     Hypertension     Membranous glomerulonephritis     Proteinuria     Pulmonary embolus (HCC)     Renal vein thrombosis (HCC)      Past Surgical History:   Procedure Laterality Date    ADENOIDECTOMY      HEMORRHOID SURGERY      VASECTOMY       Current Outpatient Medications on File Prior to Visit   Medication Sig Dispense Refill    hydrochlorothiazide (HYDRODIURIL) 12.5 mg tablet Take 1 tablet (12.5 mg total) by mouth daily 30 tablet 5    [DISCONTINUED]  lisinopril (ZESTRIL) 40 mg tablet Take 1 tablet (40 mg total) by mouth daily 90 tablet 3    [DISCONTINUED] lisinopril (ZESTRIL) 40 mg tablet Take 1 tablet (40 mg total) by mouth daily for 14 days 14 tablet 0    albuterol (ProAir HFA) 90 mcg/act inhaler Inhale 2 puffs every 6 (six) hours as needed for wheezing 8.5 g 0    guaifenesin-codeine (GUAIFENESIN AC) 100-10 MG/5ML liquid Take 5 mL by mouth daily at bedtime as needed for cough (Patient not taking: Reported on 11/15/2023) 118 mL 0    [DISCONTINUED] Diclofenac Sodium (VOLTAREN) 1 % Apply 2 g topically in the morning and 2 g at noon and 2 g in the evening and 2 g before bedtime. (Patient not taking: Reported on 6/24/2022) 50 g 3    [DISCONTINUED] omeprazole (PriLOSEC OTC) 20 MG tablet Take 1 tablet (20 mg total) by mouth daily (Patient not taking: Reported on 1/15/2024) 90 tablet 0     No current facility-administered medications on file prior to visit.     Family History   Problem Relation Age of Onset    Colon polyps Mother     Heart disease Father     Hypertension Father     Coronary artery disease Father     Nephrolithiasis Father     No Known Problems Sister     No Known Problems Brother     Diabetes Paternal Grandfather     Stroke Neg Hx     Thyroid disease Neg Hx      Social History     Socioeconomic History    Marital status: /Civil Union     Spouse name: Not on file    Number of children: Not on file    Years of education: Not on file    Highest education level: Not on file   Occupational History    Not on file   Tobacco Use    Smoking status: Never    Smokeless tobacco: Never   Vaping Use    Vaping status: Never Used   Substance and Sexual Activity    Alcohol use: Yes     Comment: socialy1 beer every 2 months    Drug use: No    Sexual activity: Yes   Other Topics Concern    Not on file   Social History Narrative    Not on file     Social Determinants of Health     Financial Resource Strain: Not on file   Food Insecurity: Not on file    Transportation Needs: Not on file   Physical Activity: Not on file   Stress: Not on file   Social Connections: Not on file   Intimate Partner Violence: Not on file   Housing Stability: Not on file     Vitals:    01/15/24 1627   BP: 122/84   BP Location: Right arm   Patient Position: Sitting   Cuff Size: Large   Pulse: 75   SpO2: 98%   Weight: 106 kg (234 lb)     Results for orders placed or performed in visit on 12/09/23   Renal function panel   Result Value Ref Range    Albumin 3.9 3.5 - 5.0 g/dL    Calcium 9.2 8.4 - 10.2 mg/dL    Phosphorus 3.1 2.7 - 4.5 mg/dL    BUN 27 (H) 5 - 25 mg/dL    Creatinine 1.40 (H) 0.60 - 1.30 mg/dL    Sodium 137 135 - 147 mmol/L    Potassium 4.1 3.5 - 5.3 mmol/L    Chloride 105 96 - 108 mmol/L    CO2 26 21 - 32 mmol/L    ANION GAP 6 mmol/L    eGFR 56 ml/min/1.73sq m    Glucose, Fasting 85 65 - 99 mg/dL   Protein / creatinine ratio, urine   Result Value Ref Range    Creatinine, Ur 85.9 Reference range not established. mg/dL    Protein Urine Random 41 Reference range not established. mg/dL    Prot/Creat Ratio, Ur 0.48 (H) 0.00 - 0.10     Weight (last 2 days)       Date/Time Weight    01/15/24 1627 106 (234)          Body mass index is 33.58 kg/m².  /84 (01/15/24 1627)    Temp      Pulse 75 (01/15/24 1627)   Resp      SpO2 98 % (01/15/24 1627)      Vitals:    01/15/24 1627   Weight: 106 kg (234 lb)     Vitals:    01/15/24 1627   Weight: 106 kg (234 lb)       /84 (BP Location: Right arm, Patient Position: Sitting, Cuff Size: Large)   Pulse 75   Wt 106 kg (234 lb)   SpO2 98%   BMI 33.58 kg/m²          Physical Exam  Constitutional:       General: He is not in acute distress.     Appearance: He is not ill-appearing.   HENT:      Head: Normocephalic and atraumatic.      Ears:      Comments: Earring aid B/L     Nose: No congestion or rhinorrhea.      Mouth/Throat:      Mouth: Mucous membranes are moist.      Pharynx: No posterior oropharyngeal erythema.      Comments:  Predominant left tonsil, no erythema, no exudate  Eyes:      General:         Right eye: No discharge.         Left eye: No discharge.      Extraocular Movements: Extraocular movements intact.      Conjunctiva/sclera: Conjunctivae normal.   Cardiovascular:      Rate and Rhythm: Normal rate and regular rhythm.      Pulses: Normal pulses.      Heart sounds: Normal heart sounds.   Pulmonary:      Effort: Pulmonary effort is normal. No respiratory distress.      Breath sounds: Normal breath sounds. No wheezing, rhonchi or rales.   Chest:      Chest wall: No tenderness.   Abdominal:      General: Bowel sounds are normal. There is no distension.      Palpations: Abdomen is soft.      Tenderness: There is no abdominal tenderness. There is no right CVA tenderness, left CVA tenderness, guarding or rebound.   Musculoskeletal:         General: No swelling or tenderness.      Cervical back: Normal range of motion and neck supple. No rigidity or tenderness.   Skin:     General: Skin is warm and dry.      Capillary Refill: Capillary refill takes less than 2 seconds.   Neurological:      Mental Status: He is alert and oriented to person, place, and time.   Psychiatric:         Mood and Affect: Mood normal.         Behavior: Behavior normal.         Judgment: Judgment normal.

## 2024-01-15 NOTE — ASSESSMENT & PLAN NOTE
Patient has been experiencing chronic cough since last Oct.  Cough is productive and nonproductive in nature, greenish to clear in color.  Endorsed ticklish sensation of his back throat.  Worsening in the early morning.  On lisinopril since 2013.  History of heartburn, reflux.  Patient was prescribed omeprazole 20 mg which he has never started taking it.  Reported unchanged chronic cough comparing to last office visit.  CXR in December did not reveal acute cardiopulmonary findings.  Felt that this is more secondary to postnasal drip versus GERD.  Less likely secondary to lisinopril.     PLAN:  Flonase nasal spray for postnasal drip.  Pepcid for GERD in the setting of CKD.  Switching lisinopril to losartan as per patient's request.  Patient was advised to follow-up with us in 1 month if worsening symptoms.

## 2024-01-15 NOTE — ASSESSMENT & PLAN NOTE
Left tonsil more predominant than Right  Reported tonsil stone.  Follow up with ENT as outpatient.

## 2024-01-15 NOTE — LETTER
Jonny Lee,    I saw Mr. Huynh in the office today for the follow up of his chronic cough.  Patient was very concerning of his chronic cough was second to the use of his lisinopril 40 mg though less likely from my evaluation. I switched it to losartan 50 mg as requested. Just would like you to be updated.    Thanks,  Gurwinder

## 2024-01-16 ENCOUNTER — TELEPHONE (OUTPATIENT)
Dept: NEPHROLOGY | Facility: CLINIC | Age: 54
End: 2024-01-16

## 2024-01-16 NOTE — TELEPHONE ENCOUNTER
Patient called the office wanting to make  aware of changes his PCP made yesterday during their visit. Per patient he was having an acute cough for a while which his PCP d/c lisinopril and started him on losartan now. He wants to make sure the medications changed are okay to take being losartan 50mg daily, Famotidine 20 mg daily and a Flonase nasal spray.     's note is in epic please review and patent call back is [phone].

## 2024-01-19 DIAGNOSIS — I10 ESSENTIAL HYPERTENSION: ICD-10-CM

## 2024-01-19 RX ORDER — HYDROCHLOROTHIAZIDE 12.5 MG/1
12.5 TABLET ORAL DAILY
Qty: 90 TABLET | Refills: 2 | Status: SHIPPED | OUTPATIENT
Start: 2024-01-19 | End: 2024-07-17

## 2024-02-15 DIAGNOSIS — I10 ESSENTIAL HYPERTENSION: ICD-10-CM

## 2024-02-15 RX ORDER — HYDROCHLOROTHIAZIDE 12.5 MG/1
12.5 TABLET ORAL DAILY
Qty: 30 TABLET | Refills: 5 | Status: SHIPPED | OUTPATIENT
Start: 2024-02-15 | End: 2024-08-13

## 2024-02-15 RX ORDER — LOSARTAN POTASSIUM 50 MG/1
50 TABLET ORAL DAILY
Qty: 30 TABLET | Refills: 5 | Status: SHIPPED | OUTPATIENT
Start: 2024-02-15 | End: 2024-07-14

## 2024-02-21 PROBLEM — Z13.1 SCREENING FOR DIABETES MELLITUS: Status: RESOLVED | Noted: 2020-07-21 | Resolved: 2024-02-21

## 2024-02-21 PROBLEM — Z12.5 SCREENING PSA (PROSTATE SPECIFIC ANTIGEN): Status: RESOLVED | Noted: 2020-07-21 | Resolved: 2024-02-21

## 2024-02-21 PROBLEM — Z12.11 SCREENING FOR MALIGNANT NEOPLASM OF COLON: Status: RESOLVED | Noted: 2020-07-21 | Resolved: 2024-02-21

## 2024-04-16 PROBLEM — N18.31 CHRONIC KIDNEY DISEASE, STAGE 3A (HCC): Status: ACTIVE | Noted: 2018-04-11

## 2024-04-16 PROBLEM — N18.31 CHRONIC KIDNEY DISEASE, STAGE 3A (HCC): Status: ACTIVE | Noted: 2024-04-16

## 2024-04-16 PROBLEM — I12.9 HYPERTENSIVE KIDNEY DISEASE: Status: ACTIVE | Noted: 2024-04-16

## 2024-06-13 ENCOUNTER — TELEPHONE (OUTPATIENT)
Age: 54
End: 2024-06-13

## 2024-06-13 NOTE — TELEPHONE ENCOUNTER
Patient states he is getting a cochlear implant and it is recommended to get the meningitis vaccine prior. Asking if provider feels the vaccine is safe in regards to his kidney disease. Please advise, thank you.

## 2024-06-13 NOTE — TELEPHONE ENCOUNTER
Left a voicemail to patient with the following information: meningitis vaccine is safe. Advised patient to please call our office to let us know he received the message and if have any other questions or concerns.

## 2024-08-07 DIAGNOSIS — I10 ESSENTIAL HYPERTENSION: ICD-10-CM

## 2024-08-07 RX ORDER — LOSARTAN POTASSIUM 50 MG/1
50 TABLET ORAL DAILY
Qty: 30 TABLET | Refills: 5 | Status: SHIPPED | OUTPATIENT
Start: 2024-08-07

## 2024-08-07 RX ORDER — HYDROCHLOROTHIAZIDE 12.5 MG/1
12.5 TABLET ORAL DAILY
Qty: 30 TABLET | Refills: 5 | Status: SHIPPED | OUTPATIENT
Start: 2024-08-07

## 2024-08-08 ENCOUNTER — TELEPHONE (OUTPATIENT)
Dept: NEPHROLOGY | Facility: CLINIC | Age: 54
End: 2024-08-08

## 2024-08-08 DIAGNOSIS — I10 ESSENTIAL HYPERTENSION: ICD-10-CM

## 2024-08-08 DIAGNOSIS — N18.31 STAGE 3A CHRONIC KIDNEY DISEASE (HCC): Primary | ICD-10-CM

## 2024-08-08 NOTE — TELEPHONE ENCOUNTER
Spoke to pt to please complete lab work prior to getting cleared for his surgery scheduled for 11/25/2024. Pt verbalized understanding and stated he will get it done tomorrow.

## 2024-08-08 NOTE — TELEPHONE ENCOUNTER
----- Message from Hayden Lee MD sent at 8/7/2024  1:40 PM EDT -----  Please contact patient, needs repeat RFP and UPC before clearance.  Please fax clearance form to [facility],  ----- Message -----  From: Lissa Barry MA  Sent: 8/7/2024   1:13 PM EDT  To: Hayden Lee MD    Received a fax for this pt to get cleared for surgery from [facility].   Surgery date 11/25/2024.

## 2024-08-24 ENCOUNTER — LAB (OUTPATIENT)
Dept: LAB | Facility: CLINIC | Age: 54
End: 2024-08-24
Payer: COMMERCIAL

## 2024-08-24 DIAGNOSIS — N18.31 STAGE 3A CHRONIC KIDNEY DISEASE (HCC): ICD-10-CM

## 2024-08-24 DIAGNOSIS — I10 ESSENTIAL HYPERTENSION: ICD-10-CM

## 2024-08-24 LAB
ALBUMIN SERPL BCG-MCNC: 4.2 G/DL (ref 3.5–5)
ANION GAP SERPL CALCULATED.3IONS-SCNC: 5 MMOL/L (ref 4–13)
BUN SERPL-MCNC: 27 MG/DL (ref 5–25)
CALCIUM SERPL-MCNC: 9.1 MG/DL (ref 8.4–10.2)
CHLORIDE SERPL-SCNC: 105 MMOL/L (ref 96–108)
CO2 SERPL-SCNC: 27 MMOL/L (ref 21–32)
CREAT SERPL-MCNC: 1.33 MG/DL (ref 0.6–1.3)
CREAT UR-MCNC: 129.4 MG/DL
GFR SERPL CREATININE-BSD FRML MDRD: 60 ML/MIN/1.73SQ M
GLUCOSE P FAST SERPL-MCNC: 89 MG/DL (ref 65–99)
PHOSPHATE SERPL-MCNC: 2.9 MG/DL (ref 2.7–4.5)
POTASSIUM SERPL-SCNC: 3.9 MMOL/L (ref 3.5–5.3)
PROT UR-MCNC: 18.8 MG/DL
PROT/CREAT UR: 0.1 MG/G{CREAT} (ref 0–0.1)
SODIUM SERPL-SCNC: 137 MMOL/L (ref 135–147)

## 2024-08-24 PROCEDURE — 80069 RENAL FUNCTION PANEL: CPT

## 2024-08-24 PROCEDURE — 36415 COLL VENOUS BLD VENIPUNCTURE: CPT

## 2024-08-24 PROCEDURE — 82570 ASSAY OF URINE CREATININE: CPT

## 2024-08-24 PROCEDURE — 84156 ASSAY OF PROTEIN URINE: CPT

## 2024-08-26 ENCOUNTER — TELEPHONE (OUTPATIENT)
Dept: NEPHROLOGY | Facility: CLINIC | Age: 54
End: 2024-08-26

## 2024-08-26 DIAGNOSIS — N18.31 STAGE 3A CHRONIC KIDNEY DISEASE (HCC): Primary | ICD-10-CM

## 2024-08-26 DIAGNOSIS — N02.2 MEMBRANOUS NEPHROPATHY DETERMINED BY BIOPSY: ICD-10-CM

## 2024-08-26 DIAGNOSIS — I10 ESSENTIAL HYPERTENSION: ICD-10-CM

## 2024-08-26 NOTE — TELEPHONE ENCOUNTER
Patient is aware ans Schedule his 1 year follow up appt on 12/5 at  with Dr. Lee. No further questions at this time.         Labs have been ordered.

## 2024-08-26 NOTE — TELEPHONE ENCOUNTER
----- Message from Hayden Lee MD sent at 8/24/2024  6:29 PM EDT -----  Regarding: FW:  Please let patient know labs are stable   Plan to follow up in 1 year from last office visit (around Dec 2025) with repeat labs, please order CBC, RFP, PTH and UPC before next visit   Thanks,  ----- Message -----  From: Lab, Background User  Sent: 8/24/2024   9:07 AM EDT  To: Hayden Lee MD

## 2024-10-02 ENCOUNTER — TELEPHONE (OUTPATIENT)
Dept: NEPHROLOGY | Facility: CLINIC | Age: 54
End: 2024-10-02

## 2024-10-02 NOTE — TELEPHONE ENCOUNTER
Received a fax from [facility] of patient surgical clearance form. Surgery date 11/25/24 of RT endaural transcanal middle ear exploration, stapedectomy vs stapedotomy.      Please advise if patient need a appointment or can you clear this patient based on labs?       Faxed form at patient chart.

## 2024-10-02 NOTE — TELEPHONE ENCOUNTER
Left a VM to patient letting him know that Dr. Lee filled and signed surgical clearance form. Hold lisinopril on day of procedure and no NSAIDs . Form faxed at [facility]/[phone]. Advised patient to please call our office to let us know he received the message and if have any other questions or concerns.

## 2024-10-04 NOTE — TELEPHONE ENCOUNTER
Called patient to make sure he received the message. Patient stated yes bu He is no longer takes lisinopril. No further questions at this time.

## 2024-10-31 ENCOUNTER — CONSULT (OUTPATIENT)
Dept: INTERNAL MEDICINE CLINIC | Facility: CLINIC | Age: 54
End: 2024-10-31
Payer: COMMERCIAL

## 2024-10-31 VITALS
DIASTOLIC BLOOD PRESSURE: 80 MMHG | HEIGHT: 70 IN | SYSTOLIC BLOOD PRESSURE: 150 MMHG | BODY MASS INDEX: 33.64 KG/M2 | OXYGEN SATURATION: 97 % | WEIGHT: 235 LBS | HEART RATE: 81 BPM

## 2024-10-31 DIAGNOSIS — Z01.818 PREOP EXAMINATION: Primary | ICD-10-CM

## 2024-10-31 PROCEDURE — 99213 OFFICE O/P EST LOW 20 MIN: CPT

## 2024-11-15 ENCOUNTER — TELEPHONE (OUTPATIENT)
Age: 54
End: 2024-11-15

## 2024-11-15 NOTE — TELEPHONE ENCOUNTER
Pt called in states that ENT wants him to go on prednisone again for another week for his ear, wanted to check with Dr Aguiar that is ok, please advise and call patient.

## 2024-11-25 ENCOUNTER — TELEPHONE (OUTPATIENT)
Dept: NEPHROLOGY | Facility: CLINIC | Age: 54
End: 2024-11-25

## 2024-11-25 NOTE — TELEPHONE ENCOUNTER
Left voicemail for the patient reminding to please complete labwork prior to 12/5 appointment with Dr. Lee. Advised patient to call back with any questions or concerns.

## 2024-11-27 NOTE — TELEPHONE ENCOUNTER
Called patient reminding to please complete labwork/ blood and urine tests  prior to 12/5 appointment with Dr. Lee.  Patient stated will do labs.

## 2024-11-30 ENCOUNTER — APPOINTMENT (OUTPATIENT)
Dept: LAB | Facility: CLINIC | Age: 54
End: 2024-11-30
Payer: COMMERCIAL

## 2024-11-30 DIAGNOSIS — I10 ESSENTIAL HYPERTENSION: ICD-10-CM

## 2024-11-30 DIAGNOSIS — N18.31 STAGE 3A CHRONIC KIDNEY DISEASE (HCC): ICD-10-CM

## 2024-11-30 DIAGNOSIS — N02.2 MEMBRANOUS NEPHROPATHY DETERMINED BY BIOPSY: ICD-10-CM

## 2024-11-30 LAB
ALBUMIN SERPL BCG-MCNC: 4 G/DL (ref 3.5–5)
ANION GAP SERPL CALCULATED.3IONS-SCNC: 7 MMOL/L (ref 4–13)
BUN SERPL-MCNC: 25 MG/DL (ref 5–25)
CALCIUM SERPL-MCNC: 8.9 MG/DL (ref 8.4–10.2)
CHLORIDE SERPL-SCNC: 104 MMOL/L (ref 96–108)
CO2 SERPL-SCNC: 27 MMOL/L (ref 21–32)
CREAT SERPL-MCNC: 1.29 MG/DL (ref 0.6–1.3)
CREAT UR-MCNC: 154 MG/DL
ERYTHROCYTE [DISTWIDTH] IN BLOOD BY AUTOMATED COUNT: 13 % (ref 11.6–15.1)
GFR SERPL CREATININE-BSD FRML MDRD: 62 ML/MIN/1.73SQ M
GLUCOSE P FAST SERPL-MCNC: 83 MG/DL (ref 65–99)
HCT VFR BLD AUTO: 44.9 % (ref 36.5–49.3)
HGB BLD-MCNC: 15.2 G/DL (ref 12–17)
MCH RBC QN AUTO: 31 PG (ref 26.8–34.3)
MCHC RBC AUTO-ENTMCNC: 33.9 G/DL (ref 31.4–37.4)
MCV RBC AUTO: 92 FL (ref 82–98)
PHOSPHATE SERPL-MCNC: 2.1 MG/DL (ref 2.7–4.5)
PLATELET # BLD AUTO: 208 THOUSANDS/UL (ref 149–390)
PMV BLD AUTO: 9.4 FL (ref 8.9–12.7)
POTASSIUM SERPL-SCNC: 4 MMOL/L (ref 3.5–5.3)
PROT UR-MCNC: 17.4 MG/DL
PROT/CREAT UR: 0.1 MG/G{CREAT} (ref 0–0.1)
PTH-INTACT SERPL-MCNC: 34.1 PG/ML (ref 12–88)
RBC # BLD AUTO: 4.9 MILLION/UL (ref 3.88–5.62)
SODIUM SERPL-SCNC: 138 MMOL/L (ref 135–147)
WBC # BLD AUTO: 5.9 THOUSAND/UL (ref 4.31–10.16)

## 2024-11-30 PROCEDURE — 82570 ASSAY OF URINE CREATININE: CPT

## 2024-11-30 PROCEDURE — 85027 COMPLETE CBC AUTOMATED: CPT

## 2024-11-30 PROCEDURE — 36415 COLL VENOUS BLD VENIPUNCTURE: CPT

## 2024-11-30 PROCEDURE — 83970 ASSAY OF PARATHORMONE: CPT

## 2024-11-30 PROCEDURE — 80069 RENAL FUNCTION PANEL: CPT

## 2024-11-30 PROCEDURE — 84156 ASSAY OF PROTEIN URINE: CPT

## 2024-12-05 ENCOUNTER — OFFICE VISIT (OUTPATIENT)
Dept: NEPHROLOGY | Facility: CLINIC | Age: 54
End: 2024-12-05
Payer: COMMERCIAL

## 2024-12-05 VITALS
HEIGHT: 70 IN | BODY MASS INDEX: 33.07 KG/M2 | SYSTOLIC BLOOD PRESSURE: 130 MMHG | HEART RATE: 88 BPM | DIASTOLIC BLOOD PRESSURE: 80 MMHG | OXYGEN SATURATION: 97 % | WEIGHT: 231 LBS

## 2024-12-05 DIAGNOSIS — E83.39 HYPOPHOSPHATEMIA: ICD-10-CM

## 2024-12-05 DIAGNOSIS — I10 ESSENTIAL HYPERTENSION: ICD-10-CM

## 2024-12-05 DIAGNOSIS — E66.812 OBESITY, CLASS II, BMI 35-39.9: ICD-10-CM

## 2024-12-05 DIAGNOSIS — N18.31 CHRONIC KIDNEY DISEASE, STAGE 3A (HCC): Primary | ICD-10-CM

## 2024-12-05 DIAGNOSIS — N02.2 MEMBRANOUS NEPHROPATHY DETERMINED BY BIOPSY: ICD-10-CM

## 2024-12-05 PROBLEM — M10.371 ACUTE GOUT DUE TO RENAL IMPAIRMENT INVOLVING TOE OF RIGHT FOOT: Status: RESOLVED | Noted: 2022-04-16 | Resolved: 2024-12-05

## 2024-12-05 PROBLEM — R05.3 CHRONIC COUGH: Status: RESOLVED | Noted: 2023-12-19 | Resolved: 2024-12-05

## 2024-12-05 PROCEDURE — 99214 OFFICE O/P EST MOD 30 MIN: CPT | Performed by: INTERNAL MEDICINE

## 2024-12-05 NOTE — PROGRESS NOTES
OFFICE FOLLOW UP - Nephrology   Jung Huynh 54 y.o. male MRN: [id number]       ASSESSMENT and PLAN:  Assessment & Plan  Chronic kidney disease, stage 3a (HCC)  Lab Results   Component Value Date    EGFR 62 11/30/2024    EGFR 60 08/24/2024    EGFR 56 12/09/2023    CREATININE 1.29 11/30/2024    CREATININE 1.33 (H) 08/24/2024    CREATININE 1.40 (H) 12/09/2023   Chronic kidney disease secondary to biopsy-proven membranous nephropathy as well as component of hypertension.  Previous creatinine fluctuating around 1.2-1.5 since 2014.  Most recent labs were reviewed with patient, kidney function improving with creatinine down to 1.29, no significant proteinuria with a UPC of 0.1.  Continue with losartan and HCTZ.  Advised to keep working on losing weight, follow low-salt diet, avoid NSAIDs.  Plan to follow repeat labs in 6-month due to hypophosphatemia, if renal function is stable I will like to see him back in the office in 1 year  Membranous nephropathy determined by biopsy  Status post kidney biopsy on 4/2013, previously treated with combination of steroids and tacrolimus, off immunosuppressive therapy since 10/2016.  Most recent UPC 0.1.  Currently on max dose ARB  Essential hypertension  Blood pressure today acceptable.  Patient brought some home blood pressure readings and is well-controlled.  Continue with max dose losartan and HCTZ.  Follow low-salt diet, advised to lose weight  Obesity, Class II, BMI 35-39.9  Advised to lose weight  Hypophosphatemia  Phosphorus diet was reviewed with patient, information provided.  Plan to follow repeat labs in 6 months      This is a 54-year-old gentleman with history of CKD stage II/III secondary to biopsy-proven idiopathic membranous nephropathy (kidney biopsy 4/2013) s/p treatment with prednisone and tacrolimus, off steroids since 6/2015, off tacrolimus since 10/2016, patient today returns to the office for year follow-up.      Patient Instructions   As we discussed in the  office visit and after reviewing most recent blood test your kidney function remains stable, your blood phosphorus level is low, we discussed about high phosphorus diet, plan to follow repeat blood test in 6 months.  No changes in your medication at this moment.  Keep working on stay physically active and keep a good weight, follow low-salt diet, avoid NSAIDs.  I would like to see you back in the office in 1 year with repeat labs.            HPI: Jung Huynh is a 54 y.o. male who is here for Follow-up and Chronic Kidney Disease  .    Patient with history of chronic kidney disease secondary to biopsy-proven membranous nephropathy, biopsy on 4/2013, last time seeing on 12/2023.  Patient today returns to the office for year follow-up.    Cough resolved after lisinopril was changed to losartan    Since our last visit, there has been no ER visits or hospitilizations.   Patient currently has no complaints at this time and is feeling well.   Patient denies any chest pain, shortness of breath and swelling.   Denies any abdominal pain, no nausea, vomiting, no diarrhea or constipation.  Denies any urinary problems, no burning sensation or gross hematuria.    The last blood work was done on 11/30/2024, which we have reviewed together.  Recent creatinine 1.29 with an estimated GFR of 62, UPC 0.1    ROS:   All the systems were reviewed and were negative except as documented on the HPI.    Allergies: Molds & smuts    Medications:   Current Outpatient Medications:     hydroCHLOROthiazide 12.5 mg tablet, TAKE 1 TABLET BY MOUTH EVERY DAY, Disp: 30 tablet, Rfl: 5    losartan (COZAAR) 50 mg tablet, TAKE 1 TABLET BY MOUTH EVERY DAY, Disp: 30 tablet, Rfl: 5    albuterol (ProAir HFA) 90 mcg/act inhaler, Inhale 2 puffs every 6 (six) hours as needed for wheezing, Disp: 8.5 g, Rfl: 0    famotidine (PEPCID) 20 mg tablet, Take 1 tablet (20 mg total) by mouth daily at bedtime, Disp: 30 tablet, Rfl: 0    fluticasone (FLONASE) 50 mcg/act  "nasal spray, 1 spray into each nostril daily, Disp: 9.9 mL, Rfl: 0    guaifenesin-codeine (GUAIFENESIN AC) 100-10 MG/5ML liquid, Take 5 mL by mouth daily at bedtime as needed for cough (Patient not taking: Reported on 11/15/2023), Disp: 118 mL, Rfl: 0    Past Medical History:   Diagnosis Date    DVT (deep venous thrombosis) (HCC)     Dyslipidemia     Hemorrhoids     [place] (hard of hearing)     Hypertension     Membranous glomerulonephritis     Proteinuria     Pulmonary embolus (HCC)     Renal vein thrombosis (HCC)      Past Surgical History:   Procedure Laterality Date    ADENOIDECTOMY      HEMORRHOID SURGERY      VASECTOMY       Family History   Problem Relation Age of Onset    Colon polyps Mother     Heart disease Father     Hypertension Father     Coronary artery disease Father     Nephrolithiasis Father     No Known Problems Sister     No Known Problems Brother     Diabetes Paternal Grandfather     Stroke Neg Hx     Thyroid disease Neg Hx       reports that he has never smoked. He has never used smokeless tobacco. He reports current alcohol use. He reports that he does not use drugs.      Physical Exam:   Vitals:    12/05/24 1448   BP: 130/80   BP Location: Left arm   Patient Position: Sitting   Cuff Size: Adult   Pulse: 88   SpO2: 97%   Weight: 105 kg (231 lb)   Height: 5' 10\" (1.778 m)     Body mass index is 33.15 kg/m².    General: cooperative, in not acute distress  Eyes: conjunctivae pink, anicteric sclerae  ENT: lips and mucous membranes moist  Neck: supple, no JVD  Chest: clear breath sounds bilateral, no crackles, ronchus or wheezings  CVS: distinct S1 & S2, normal rate, regular rhythm  Abdomen: soft, non-tender, non-distended, normoactive bowel sounds  Back: no CVA tenderness  Extremities: no edema of both legs  Skin: no rash  Neuro: awake, alert, oriented      Lab Results:  Results for orders placed or performed in visit on 11/30/24   CBC    Collection Time: 11/30/24  9:08 AM   Result Value Ref Range    " "WBC 5.90 4.31 - 10.16 Thousand/uL    RBC 4.90 3.88 - 5.62 Million/uL    Hemoglobin 15.2 12.0 - 17.0 g/dL    Hematocrit 44.9 36.5 - 49.3 %    MCV 92 82 - 98 fL    MCH 31.0 26.8 - 34.3 pg    MCHC 33.9 31.4 - 37.4 g/dL    RDW 13.0 11.6 - 15.1 %    Platelets 208 149 - 390 Thousands/uL    MPV 9.4 8.9 - 12.7 fL   Renal function panel    Collection Time: 11/30/24  9:08 AM   Result Value Ref Range    Albumin 4.0 3.5 - 5.0 g/dL    Calcium 8.9 8.4 - 10.2 mg/dL    Phosphorus 2.1 (L) 2.7 - 4.5 mg/dL    BUN 25 5 - 25 mg/dL    Creatinine 1.29 0.60 - 1.30 mg/dL    Sodium 138 135 - 147 mmol/L    Potassium 4.0 3.5 - 5.3 mmol/L    Chloride 104 96 - 108 mmol/L    CO2 27 21 - 32 mmol/L    ANION GAP 7 4 - 13 mmol/L    eGFR 62 ml/min/1.73sq m    Glucose, Fasting 83 65 - 99 mg/dL   PTH, intact    Collection Time: 11/30/24  9:08 AM   Result Value Ref Range    PTH 34.1 12.0 - 88.0 pg/mL   Protein / creatinine ratio, urine    Collection Time: 11/30/24  9:08 AM   Result Value Ref Range    Creatinine, Ur 154.0 Reference range not established. mg/dL    Protein Urine Random 17.4 Reference range not established. mg/dL    Prot/Creat Ratio, Ur 0.1 0.0 - 0.1       Results from last 7 days   Lab Units 11/30/24  0908   WBC Thousand/uL 5.90   HEMOGLOBIN g/dL 15.2   HEMATOCRIT % 44.9   PLATELETS Thousands/uL 208   SODIUM mmol/L 138   POTASSIUM mmol/L 4.0   CHLORIDE mmol/L 104   CO2 mmol/L 27   BUN mg/dL 25   CREATININE mg/dL 1.29   CALCIUM mg/dL 8.9   PHOSPHORUS mg/dL 2.1*           Portions of the record may have been created with voice recognition software. Occasional wrong word or \"sound a like\" substitutions may have occurred due to the inherent limitations of voice recognition software. Read the chart carefully and recognize, using context, where substitutions have occurred.If you have any questions, please contact the dictating provider.  "

## 2024-12-05 NOTE — ASSESSMENT & PLAN NOTE
Status post kidney biopsy on 4/2013, previously treated with combination of steroids and tacrolimus, off immunosuppressive therapy since 10/2016.  Most recent UPC 0.1.  Currently on max dose ARB

## 2024-12-05 NOTE — ASSESSMENT & PLAN NOTE
Blood pressure today acceptable.  Patient brought some home blood pressure readings and is well-controlled.  Continue with max dose losartan and HCTZ.  Follow low-salt diet, advised to lose weight

## 2024-12-05 NOTE — ASSESSMENT & PLAN NOTE
Phosphorus diet was reviewed with patient, information provided.  Plan to follow repeat labs in 6 months

## 2024-12-05 NOTE — ASSESSMENT & PLAN NOTE
Lab Results   Component Value Date    EGFR 62 11/30/2024    EGFR 60 08/24/2024    EGFR 56 12/09/2023    CREATININE 1.29 11/30/2024    CREATININE 1.33 (H) 08/24/2024    CREATININE 1.40 (H) 12/09/2023   Chronic kidney disease secondary to biopsy-proven membranous nephropathy as well as component of hypertension.  Previous creatinine fluctuating around 1.2-1.5 since 2014.  Most recent labs were reviewed with patient, kidney function improving with creatinine down to 1.29, no significant proteinuria with a UPC of 0.1.  Continue with losartan and HCTZ.  Advised to keep working on losing weight, follow low-salt diet, avoid NSAIDs.  Plan to follow repeat labs in 6-month due to hypophosphatemia, if renal function is stable I will like to see him back in the office in 1 year

## 2024-12-05 NOTE — PATIENT INSTRUCTIONS
As we discussed in the office visit and after reviewing most recent blood test your kidney function remains stable, your blood phosphorus level is low, we discussed about high phosphorus diet, plan to follow repeat blood test in 6 months.  No changes in your medication at this moment.  Keep working on stay physically active and keep a good weight, follow low-salt diet, avoid NSAIDs.  I would like to see you back in the office in 1 year with repeat labs.

## 2025-01-28 ENCOUNTER — OFFICE VISIT (OUTPATIENT)
Dept: INTERNAL MEDICINE CLINIC | Facility: CLINIC | Age: 55
End: 2025-01-28
Payer: COMMERCIAL

## 2025-01-28 VITALS
HEIGHT: 70 IN | BODY MASS INDEX: 33.21 KG/M2 | HEART RATE: 84 BPM | RESPIRATION RATE: 12 BRPM | WEIGHT: 232 LBS | SYSTOLIC BLOOD PRESSURE: 110 MMHG | DIASTOLIC BLOOD PRESSURE: 60 MMHG

## 2025-01-28 DIAGNOSIS — N02.2 MEMBRANOUS NEPHROPATHY DETERMINED BY BIOPSY: ICD-10-CM

## 2025-01-28 DIAGNOSIS — R42 DIZZINESS: Primary | ICD-10-CM

## 2025-01-28 DIAGNOSIS — N18.31 CHRONIC KIDNEY DISEASE, STAGE 3A (HCC): ICD-10-CM

## 2025-01-28 DIAGNOSIS — I10 ESSENTIAL HYPERTENSION: ICD-10-CM

## 2025-01-28 PROCEDURE — 99215 OFFICE O/P EST HI 40 MIN: CPT | Performed by: INTERNAL MEDICINE

## 2025-01-28 PROCEDURE — 93000 ELECTROCARDIOGRAM COMPLETE: CPT | Performed by: INTERNAL MEDICINE

## 2025-01-28 RX ORDER — MECLIZINE HYDROCHLORIDE 25 MG/1
25 TABLET ORAL 3 TIMES DAILY PRN
Qty: 50 TABLET | Refills: 0 | Status: SHIPPED | OUTPATIENT
Start: 2025-01-28

## 2025-01-28 NOTE — ASSESSMENT & PLAN NOTE
Lab Results   Component Value Date    EGFR 62 11/30/2024    EGFR 60 08/24/2024    EGFR 56 12/09/2023    CREATININE 1.29 11/30/2024    CREATININE 1.33 (H) 08/24/2024    CREATININE 1.40 (H) 12/09/2023   Follow-up nephrology  Orders:  •  Comprehensive metabolic panel; Future

## 2025-01-28 NOTE — PATIENT INSTRUCTIONS
Problem List Items Addressed This Visit          Cardiovascular and Mediastinum    Essential hypertension    Blood pressure is well-controlled, continue current medication follow-up nephrology               Genitourinary    Membranous nephropathy determined by biopsy    Patient treated with tacrolimus and completed therapy October 2016, and also treated with prednisone and completed this June 2015            Chronic kidney disease, stage 3a (HCC)    Lab Results   Component Value Date    EGFR 62 11/30/2024    EGFR 60 08/24/2024    EGFR 56 12/09/2023    CREATININE 1.29 11/30/2024    CREATININE 1.33 (H) 08/24/2024    CREATININE 1.40 (H) 12/09/2023   Follow-up nephrology  Orders:    Comprehensive metabolic panel; Future         Relevant Orders    Comprehensive metabolic panel       Other    Dizziness - Primary    Patient just had the 1 episode, discussed possibilities including vertigo, but was also having some lightheadedness the possibility of blood pressure dropping.  On exam today there were no orthostatic changes, cardiopulmonary exam is normal, neuroexam including cerebellar function is normal.  With patient having recent ear interventions, vertigo/vestibular issues is a possibility.  With patient having tingling down the left arm, will also check EKG.  Patient denies any chest pressure or shortness of breath.    Assuming EKG normal, will give prescription for meclizine to use as needed, patient does not need to take this, but if he has a return of symptoms can try this.  Also recommend follow-up with ENT.  If patient having ongoing symptoms, or worsening symptoms, then to get to the emergency room.  Discussed the possibility of physical therapy if symptoms return.        Orders:    POCT ECG    meclizine (ANTIVERT) 25 mg tablet; Take 1 tablet (25 mg total) by mouth 3 (three) times a day as needed for dizziness         Relevant Medications    meclizine (ANTIVERT) 25 mg tablet    Other Relevant Orders    POCT ECG  (Completed)    High Sensitivity Troponin I Random

## 2025-01-28 NOTE — ASSESSMENT & PLAN NOTE
Patient just had the 1 episode, discussed possibilities including vertigo, but was also having some lightheadedness the possibility of blood pressure dropping.  On exam today there were no orthostatic changes, cardiopulmonary exam is normal, neuroexam including cerebellar function is normal.  With patient having recent ear interventions, vertigo/vestibular issues is a possibility.  With patient having tingling down the left arm, will also check EKG.  Patient denies any chest pressure or shortness of breath.    Assuming EKG normal, will give prescription for meclizine to use as needed, patient does not need to take this, but if he has a return of symptoms can try this.  Also recommend follow-up with ENT.  If patient having ongoing symptoms, or worsening symptoms, then to get to the emergency room.  Discussed the possibility of physical therapy if symptoms return.    EKG shows Normal sinus rhythm at 66 bpm, normal axis, no ischemic changes, EKG okay.  I recommend further workup with cardiology if having ongoing episodes of dizziness/lightheadedness to consider cardiac monitoring.        Orders:  •  POCT ECG  •  meclizine (ANTIVERT) 25 mg tablet; Take 1 tablet (25 mg total) by mouth 3 (three) times a day as needed for dizziness  •  High Sensitivity Troponin I Random; Future

## 2025-01-28 NOTE — PROGRESS NOTES
Name: Jung Huynh      : 1970      MRN: [id number]  Encounter Provider: Kemar Ibarra MD  Encounter Date: 2025   Encounter department: [facility]  :  Assessment & Plan  Dizziness  Patient just had the 1 episode, discussed possibilities including vertigo, but was also having some lightheadedness the possibility of blood pressure dropping.  On exam today there were no orthostatic changes, cardiopulmonary exam is normal, neuroexam including cerebellar function is normal.  With patient having recent ear interventions, vertigo/vestibular issues is a possibility.  With patient having tingling down the left arm, will also check EKG.  Patient denies any chest pressure or shortness of breath.    Assuming EKG normal, will give prescription for meclizine to use as needed, patient does not need to take this, but if he has a return of symptoms can try this.  Also recommend follow-up with ENT.  If patient having ongoing symptoms, or worsening symptoms, then to get to the emergency room.  Discussed the possibility of physical therapy if symptoms return.    EKG shows Normal sinus rhythm at 66 bpm, normal axis, no ischemic changes, EKG okay.  I recommend further workup with cardiology if having ongoing episodes of dizziness/lightheadedness to consider cardiac monitoring.        Orders:  •  POCT ECG  •  meclizine (ANTIVERT) 25 mg tablet; Take 1 tablet (25 mg total) by mouth 3 (three) times a day as needed for dizziness  •  High Sensitivity Troponin I Random; Future    Essential hypertension  Blood pressure is well-controlled, continue current medication follow-up nephrology       Membranous nephropathy determined by biopsy  Patient treated with tacrolimus and completed therapy 2016, and also treated with prednisone and completed this 2015       Chronic kidney disease, stage 3a (HCC)  Lab Results   Component Value Date    EGFR 62 2024    EGFR 60 2024    EGFR 56  12/09/2023    CREATININE 1.29 11/30/2024    CREATININE 1.33 (H) 08/24/2024    CREATININE 1.40 (H) 12/09/2023   Follow-up nephrology  Orders:  •  Comprehensive metabolic panel; Future           History of Present Illness   I am seeing patient today in coverage for an acute issue, chart reviewed.  Patient has hypertension, sees nephrology regularly for this, last blood pressure of their office was good, patient has a history of membranous nephropathy, was on tacrolimus which ended October 2016, and also steroids which ended June 2015.  Patient had recent auricular cyst excision done November 6, 2024, has been following with [facility] for this and most recently saw them in January 3, 2025.    Patient in today for complaint of dizziness and mild nausea last night.  He leaned up against a dresser to gain his bearings.  He also had some lightheadedness.  He had a little tingling down left arm for 5-10 minutes.  No associated chest pain or pressure or SOB or heart palpitations.  No new medications or change in medications.  Patient was eating and drinking normally that day, feeling in his normal state of health, no fevers or chills, no vomiting or diarrhea.  Patient not taking cough medicine or Benadryl or other over-the-counter meds.  No difficulty swallowing, no slurred speech, no double vision.  No history of dizziness in the past.  Patient denies symptoms of hyperventilating or being anxious with the episode.  No calf pain or swelling.  No postictal state described.      Review of Systems   Constitutional:  Negative for chills, fatigue and fever.   HENT:  Negative for congestion, ear pain, nosebleeds, postnasal drip, sore throat and trouble swallowing.    Eyes:  Negative for pain.   Respiratory:  Negative for cough, chest tightness, shortness of breath and wheezing.    Cardiovascular:  Negative for chest pain, palpitations and leg swelling.   Gastrointestinal:  Negative for abdominal pain, constipation,  "diarrhea, nausea and vomiting.   Endocrine: Negative for polydipsia and polyuria.   Genitourinary:  Negative for dysuria, flank pain and hematuria.   Musculoskeletal:  Negative for arthralgias.   Skin:  Negative for rash.   Neurological:  Positive for dizziness and light-headedness. Negative for tremors, speech difficulty and headaches.   Hematological:  Does not bruise/bleed easily.   Psychiatric/Behavioral:  Negative for confusion and dysphoric mood. The patient is not nervous/anxious.        Objective   /60 (Patient Position: Standing)   Pulse 84   Resp 12   Ht 5' 10\" (1.778 m)   Wt 105 kg (232 lb)   BMI 33.29 kg/m²      Physical Exam  Constitutional:       General: He is not in acute distress.     Appearance: Normal appearance.   HENT:      Right Ear: Tympanic membrane normal.      Left Ear: Tympanic membrane normal.      Mouth/Throat:      Mouth: Mucous membranes are moist.      Pharynx: No oropharyngeal exudate or posterior oropharyngeal erythema.   Eyes:      Comments: No rotational or vertical nystagmus   Cardiovascular:      Rate and Rhythm: Normal rate and regular rhythm.      Heart sounds: Normal heart sounds. No murmur heard.  Pulmonary:      Effort: Pulmonary effort is normal. No respiratory distress.      Breath sounds: Normal breath sounds. No stridor. No wheezing or rales.   Musculoskeletal:      Right lower leg: No edema.      Left lower leg: No edema.   Skin:     Coloration: Skin is not jaundiced or pale.   Neurological:      Mental Status: He is alert.      Comments: Normal gait, no hand drift, good finger-nose movement, no coordination problems, no no slurred speech, no word finding problems         "

## 2025-01-28 NOTE — ASSESSMENT & PLAN NOTE
Patient treated with tacrolimus and completed therapy October 2016, and also treated with prednisone and completed this June 2015

## 2025-02-21 DIAGNOSIS — I10 ESSENTIAL HYPERTENSION: ICD-10-CM

## 2025-02-21 RX ORDER — LOSARTAN POTASSIUM 50 MG/1
50 TABLET ORAL DAILY
Qty: 30 TABLET | Refills: 5 | Status: SHIPPED | OUTPATIENT
Start: 2025-02-21

## 2025-02-21 RX ORDER — HYDROCHLOROTHIAZIDE 12.5 MG/1
12.5 TABLET ORAL DAILY
Qty: 30 TABLET | Refills: 5 | Status: SHIPPED | OUTPATIENT
Start: 2025-02-21

## 2025-06-08 ENCOUNTER — RESULTS FOLLOW-UP (OUTPATIENT)
Age: 55
End: 2025-06-08

## 2025-07-30 ENCOUNTER — APPOINTMENT (OUTPATIENT)
Dept: LAB | Facility: CLINIC | Age: 55
End: 2025-07-30
Attending: INTERNAL MEDICINE
Payer: COMMERCIAL

## 2025-07-30 DIAGNOSIS — N18.31 CHRONIC KIDNEY DISEASE, STAGE 3A (HCC): ICD-10-CM

## 2025-07-30 DIAGNOSIS — E83.39 HYPOPHOSPHATEMIA: ICD-10-CM

## 2025-07-30 DIAGNOSIS — R42 DIZZINESS: ICD-10-CM

## 2025-07-30 LAB
ALBUMIN SERPL BCG-MCNC: 4.3 G/DL (ref 3.5–5)
ALP SERPL-CCNC: 73 U/L (ref 34–104)
ALT SERPL W P-5'-P-CCNC: 16 U/L (ref 7–52)
ANION GAP SERPL CALCULATED.3IONS-SCNC: 7 MMOL/L (ref 4–13)
AST SERPL W P-5'-P-CCNC: 16 U/L (ref 13–39)
BILIRUB SERPL-MCNC: 1.13 MG/DL (ref 0.2–1)
BUN SERPL-MCNC: 23 MG/DL (ref 5–25)
CALCIUM SERPL-MCNC: 9.5 MG/DL (ref 8.4–10.2)
CARDIAC TROPONIN I PNL SERPL HS: 3 NG/L (ref 8–18)
CHLORIDE SERPL-SCNC: 104 MMOL/L (ref 96–108)
CO2 SERPL-SCNC: 27 MMOL/L (ref 21–32)
CREAT SERPL-MCNC: 1.33 MG/DL (ref 0.6–1.3)
GFR SERPL CREATININE-BSD FRML MDRD: 59 ML/MIN/1.73SQ M
GLUCOSE P FAST SERPL-MCNC: 90 MG/DL (ref 65–99)
PHOSPHATE SERPL-MCNC: 3.2 MG/DL (ref 2.7–4.5)
POTASSIUM SERPL-SCNC: 3.9 MMOL/L (ref 3.5–5.3)
PROT SERPL-MCNC: 7.1 G/DL (ref 6.4–8.4)
SODIUM SERPL-SCNC: 138 MMOL/L (ref 135–147)

## 2025-07-30 PROCEDURE — 84100 ASSAY OF PHOSPHORUS: CPT

## 2025-07-30 PROCEDURE — 84484 ASSAY OF TROPONIN QUANT: CPT

## 2025-07-30 PROCEDURE — 80053 COMPREHEN METABOLIC PANEL: CPT

## 2025-07-30 PROCEDURE — 36415 COLL VENOUS BLD VENIPUNCTURE: CPT
